# Patient Record
Sex: FEMALE | Race: WHITE | NOT HISPANIC OR LATINO | Employment: OTHER | URBAN - METROPOLITAN AREA
[De-identification: names, ages, dates, MRNs, and addresses within clinical notes are randomized per-mention and may not be internally consistent; named-entity substitution may affect disease eponyms.]

---

## 2017-02-13 ENCOUNTER — HOSPITAL ENCOUNTER (EMERGENCY)
Facility: HOSPITAL | Age: 57
Discharge: HOME | End: 2017-02-13
Attending: EMERGENCY MEDICINE
Payer: COMMERCIAL

## 2017-02-13 VITALS
WEIGHT: 98 LBS | HEART RATE: 110 BPM | SYSTOLIC BLOOD PRESSURE: 116 MMHG | TEMPERATURE: 99 F | HEIGHT: 63 IN | BODY MASS INDEX: 17.36 KG/M2 | RESPIRATION RATE: 26 BRPM | OXYGEN SATURATION: 91 % | DIASTOLIC BLOOD PRESSURE: 62 MMHG

## 2017-02-13 DIAGNOSIS — J40 BRONCHITIS: ICD-10-CM

## 2017-02-13 DIAGNOSIS — J44.1 COPD WITH ACUTE EXACERBATION: ICD-10-CM

## 2017-02-13 DIAGNOSIS — F17.200 TOBACCO USE DISORDER: ICD-10-CM

## 2017-02-13 DIAGNOSIS — R06.03 RESPIRATORY DISTRESS: Primary | ICD-10-CM

## 2017-02-13 LAB
ALBUMIN SERPL BCP-MCNC: 3 G/DL
ALP SERPL-CCNC: 109 U/L
ALT SERPL W/O P-5'-P-CCNC: 11 U/L
AMORPH CRY URNS QL MICRO: NORMAL
ANION GAP SERPL CALC-SCNC: 9 MMOL/L
AST SERPL-CCNC: 13 U/L
BACTERIA #/AREA URNS HPF: NORMAL /HPF
BASOPHILS # BLD AUTO: 0.01 K/UL
BASOPHILS NFR BLD: 0.1 %
BILIRUB SERPL-MCNC: 0.7 MG/DL
BILIRUB UR QL STRIP: NEGATIVE
BNP SERPL-MCNC: 12 PG/ML
BUN SERPL-MCNC: 12 MG/DL
CALCIUM SERPL-MCNC: 9.3 MG/DL
CHLORIDE SERPL-SCNC: 100 MMOL/L
CLARITY UR: CLEAR
CO2 SERPL-SCNC: 26 MMOL/L
COLOR UR: ABNORMAL
CREAT SERPL-MCNC: 0.8 MG/DL
DIFFERENTIAL METHOD: ABNORMAL
EOSINOPHIL # BLD AUTO: 0.1 K/UL
EOSINOPHIL NFR BLD: 0.4 %
ERYTHROCYTE [DISTWIDTH] IN BLOOD BY AUTOMATED COUNT: 12.8 %
EST. GFR  (AFRICAN AMERICAN): >60 ML/MIN/1.73 M^2
EST. GFR  (NON AFRICAN AMERICAN): >60 ML/MIN/1.73 M^2
GLUCOSE SERPL-MCNC: 114 MG/DL
GLUCOSE UR QL STRIP: NEGATIVE
HCT VFR BLD AUTO: 41.9 %
HGB BLD-MCNC: 14.2 G/DL
HGB UR QL STRIP: NEGATIVE
HYALINE CASTS #/AREA URNS LPF: 0 /LPF
KETONES UR QL STRIP: ABNORMAL
LEUKOCYTE ESTERASE UR QL STRIP: NEGATIVE
LYMPHOCYTES # BLD AUTO: 0.8 K/UL
LYMPHOCYTES NFR BLD: 6 %
MCH RBC QN AUTO: 30.2 PG
MCHC RBC AUTO-ENTMCNC: 33.9 %
MCV RBC AUTO: 89 FL
MICROSCOPIC COMMENT: NORMAL
MONOCYTES # BLD AUTO: 0.8 K/UL
MONOCYTES NFR BLD: 6.1 %
NEUTROPHILS # BLD AUTO: 12 K/UL
NEUTROPHILS NFR BLD: 87.4 %
NITRITE UR QL STRIP: NEGATIVE
PH UR STRIP: 5 [PH] (ref 5–8)
PLATELET # BLD AUTO: 272 K/UL
PMV BLD AUTO: 12.5 FL
POTASSIUM SERPL-SCNC: 3.6 MMOL/L
PROT SERPL-MCNC: 7.1 G/DL
PROT UR QL STRIP: ABNORMAL
RBC # BLD AUTO: 4.7 M/UL
RBC #/AREA URNS HPF: 1 /HPF (ref 0–4)
SODIUM SERPL-SCNC: 135 MMOL/L
SP GR UR STRIP: 1.02 (ref 1–1.03)
SQUAMOUS #/AREA URNS HPF: 4 /HPF
TROPONIN I SERPL DL<=0.01 NG/ML-MCNC: <0.006 NG/ML
URN SPEC COLLECT METH UR: ABNORMAL
UROBILINOGEN UR STRIP-ACNC: ABNORMAL EU/DL
WBC # BLD AUTO: 13.7 K/UL
WBC #/AREA URNS HPF: 1 /HPF (ref 0–5)

## 2017-02-13 PROCEDURE — 80053 COMPREHEN METABOLIC PANEL: CPT

## 2017-02-13 PROCEDURE — 85025 COMPLETE CBC W/AUTO DIFF WBC: CPT

## 2017-02-13 PROCEDURE — 81000 URINALYSIS NONAUTO W/SCOPE: CPT

## 2017-02-13 PROCEDURE — 96365 THER/PROPH/DIAG IV INF INIT: CPT

## 2017-02-13 PROCEDURE — 96376 TX/PRO/DX INJ SAME DRUG ADON: CPT

## 2017-02-13 PROCEDURE — 99900035 HC TECH TIME PER 15 MIN (STAT)

## 2017-02-13 PROCEDURE — 25000242 PHARM REV CODE 250 ALT 637 W/ HCPCS: Performed by: EMERGENCY MEDICINE

## 2017-02-13 PROCEDURE — 99284 EMERGENCY DEPT VISIT MOD MDM: CPT | Mod: 25

## 2017-02-13 PROCEDURE — 63600175 PHARM REV CODE 636 W HCPCS: Performed by: EMERGENCY MEDICINE

## 2017-02-13 PROCEDURE — 84484 ASSAY OF TROPONIN QUANT: CPT

## 2017-02-13 PROCEDURE — 94644 CONT INHLJ TX 1ST HOUR: CPT

## 2017-02-13 PROCEDURE — 83880 ASSAY OF NATRIURETIC PEPTIDE: CPT

## 2017-02-13 PROCEDURE — 27100107 HC POCKET PEAK FLOW METER

## 2017-02-13 RX ORDER — MAGNESIUM SULFATE 1 G/100ML
1 INJECTION INTRAVENOUS
Status: COMPLETED | OUTPATIENT
Start: 2017-02-13 | End: 2017-02-13

## 2017-02-13 RX ORDER — IPRATROPIUM BROMIDE 0.5 MG/2.5ML
1 SOLUTION RESPIRATORY (INHALATION)
Status: COMPLETED | OUTPATIENT
Start: 2017-02-13 | End: 2017-02-13

## 2017-02-13 RX ORDER — DOXYCYCLINE 100 MG/1
100 CAPSULE ORAL 2 TIMES DAILY
Qty: 14 CAPSULE | Refills: 0 | Status: SHIPPED | OUTPATIENT
Start: 2017-02-13 | End: 2017-02-20

## 2017-02-13 RX ORDER — METHYLPREDNISOLONE SOD SUCC 125 MG
125 VIAL (EA) INJECTION
Status: COMPLETED | OUTPATIENT
Start: 2017-02-13 | End: 2017-02-13

## 2017-02-13 RX ORDER — ALBUTEROL SULFATE 2.5 MG/.5ML
15 SOLUTION RESPIRATORY (INHALATION)
Status: COMPLETED | OUTPATIENT
Start: 2017-02-13 | End: 2017-02-13

## 2017-02-13 RX ORDER — PREDNISONE 20 MG/1
40 TABLET ORAL DAILY
Qty: 10 TABLET | Refills: 0 | Status: SHIPPED | OUTPATIENT
Start: 2017-02-13 | End: 2017-02-18

## 2017-02-13 RX ADMIN — METHYLPREDNISOLONE SODIUM SUCCINATE 125 MG: 125 INJECTION, POWDER, FOR SOLUTION INTRAMUSCULAR; INTRAVENOUS at 11:02

## 2017-02-13 RX ADMIN — IPRATROPIUM BROMIDE 1 MG: 0.5 SOLUTION RESPIRATORY (INHALATION) at 11:02

## 2017-02-13 RX ADMIN — ALBUTEROL SULFATE 15 MG: 2.5 SOLUTION RESPIRATORY (INHALATION) at 11:02

## 2017-02-13 RX ADMIN — MAGNESIUM SULFATE 1 G: 1 INJECTION INTRAVENOUS at 11:02

## 2017-02-13 NOTE — ED AVS SNAPSHOT
[facility]  [place] [address]               Renée Lucas   2017 11:11 AM   ED    Description:  Female : 1960   Department:  [facility]           Your Care was Coordinated By:     Provider Role From To    Maria A Roberto MD Attending Provider 17 1112 17 1113    Fred Trivedi MD Attending Provider 17 1113 17 1113    Maria A Roberto MD Attending Provider 17 1113 --      Reason for Visit     Shortness of Breath           Diagnoses this Visit        Comments    COPD with acute exacerbation    -  Primary     Tobacco use disorder         Respiratory distress         Bronchitis           ED Disposition     ED Disposition Condition Comment    Discharge             To Do List           Follow-up Information     Follow up with Enoch Fonseca NP In 2 days.    Specialty:  Internal Medicine    Contact information:    [address] [place] [id number] [phone]         These Medications        Disp Refills Start End    predniSONE (DELTASONE) 20 MG tablet 10 tablet 0 2017    Take 2 tablets (40 mg total) by mouth once daily. - Oral    Pharmacy: [facility]  [facility] AT [facility] [place] Ph #: [phone]       doxycycline (VIBRAMYCIN) 100 MG Cap 14 capsule 0 2017    Take 1 capsule (100 mg total) by mouth 2 (two) times daily. - Oral    Pharmacy: [facility]  [facility] AT [facility] [place] Ph #: [phone]         [facility]     [facility] -  Assistance  Registered nurses in the [facility] provide clinical advisement, health education, appointment booking, and other advisory services.  Call for this free service at [phone].             Medications           Message regarding Medications     Verify the  changes and/or additions to your medication regime listed below are the same as discussed with your clinician today.  If any of these changes or additions are incorrect, please notify your healthcare provider.        START taking these NEW medications        Refills    predniSONE (DELTASONE) 20 MG tablet 0    Sig: Take 2 tablets (40 mg total) by mouth once daily.    Class: Print    Route: Oral    doxycycline (VIBRAMYCIN) 100 MG Cap 0    Sig: Take 1 capsule (100 mg total) by mouth 2 (two) times daily.    Class: Print    Route: Oral      These medications were administered today        Dose Freq    methylPREDNISolone sodium succinate injection 125 mg 125 mg ED 1 Time    Sig: Inject 125 mg into the vein ED 1 Time.    Class: Normal    Route: Intravenous    albuterol sulfate nebulizer solution 15 mg 15 mg ED 1 Time    Sig: Take 15 mg by nebulization ED 1 Time.    Class: Normal    Route: Nebulization    ipratropium 0.02 % nebulizer solution 1 mg 1 mg ED 1 Time    Sig: Take 5 mLs (1 mg total) by nebulization ED 1 Time.    Class: Normal    Route: Nebulization    magnesium sulfate in dextrose IVPB (premix) 1 g 1 g ED 1 Time    Sig: Inject 100 mLs (1 g total) into the vein ED 1 Time.    Class: Normal    Route: Intravenous           Verify that the below list of medications is an accurate representation of the medications you are currently taking.  If none reported, the list may be blank. If incorrect, please contact your healthcare provider. Carry this list with you in case of emergency.           Current Medications     acetaminophen (TYLENOL) 500 MG tablet Take 1,500 mg by mouth as needed for Pain.    aclidinium bromide (TUDORZA PRESSAIR) 400 mcg/actuation AePB Inhale 1 puff into the lungs 2 (two) times daily.    albuterol (PROVENTIL) 2.5 mg /3 mL (0.083 %) nebulizer solution Take 3 mLs (2.5 mg total) by nebulization every 6 (six) hours as needed.    ALBUTEROL INHL Inhale 2 puffs into the lungs every 6 (six) hours as  needed.     albuterol sulfate nebulizer solution 15 mg Take 15 mg by nebulization ED 1 Time.    benzonatate (TESSALON) 200 MG capsule Take 1 capsule (200 mg total) by mouth 3 (three) times daily as needed.    BUDESONIDE/FORMOTEROL FUMARATE (SYMBICORT INHL) Inhale 2 puffs into the lungs 2 (two) times daily.     clotrimazole-betamethasone 1-0.05% cream Apply topically 2 (two) times daily.    cyanocobalamin injection 1,000 mcg Inject 1 mL (1,000 mcg total) into the muscle every 30 days.    DALIRESP 500 mcg Tab Take 500 mcg by mouth once daily.     doxycycline (VIBRAMYCIN) 100 MG Cap Take 1 capsule (100 mg total) by mouth 2 (two) times daily.    DYMISTA 137-50 mcg/spray Spry 1 puff by Each Nare route 2 (two) times daily.     estradiol (ESTRACE) 1 MG tablet Take 1 tablet (1 mg total) by mouth once daily.    estradiol cypionate 5 mg/mL injection 5 mg Inject 1 mL (5 mg total) into the muscle every 28 days.    estradiol valerate (DELESTROGEN) injection 20 mg/mL Inject 1 mL (20 mg total) into the muscle once.    estradiol valerate (DELESTROGEN) injection 20 mg/mL Inject 1 mL (20 mg total) into the muscle every 30 days.    estradiol valerate injection 20 mg Inject 0.5 mLs (20 mg total) into the muscle every 21 days.    estradiol valerate injection 20 mg Inject 0.5 mLs (20 mg total) into the muscle every 21 days.    GUAIATUSSIN AC  mg/5 mL syrup Take 5 mLs by mouth every 8 (eight) hours as needed.     ketoconazole (NIZORAL) 2 % cream Apply topically 2 (two) times daily. To the neck for 2-4 weeks.    lansoprazole (PREVACID) 15 MG capsule Take 15 mg by mouth as needed.    montelukast (SINGULAIR) 10 mg tablet Take 10 mg by mouth every evening.     paroxetine (PAXIL) 10 MG tablet Take 2 tablets (20 mg total) by mouth every morning.    predniSONE (DELTASONE) 20 MG tablet Take 2 tablets (40 mg total) by mouth once daily.    SYMBICORT 160-4.5 mcg/actuation HFAA     testosterone cypionate injection 50 mg Inject 0.25 mLs (50 mg  "total) into the muscle every 21 days.    testosterone cypionate injection 50 mg Inject 0.25 mLs (50 mg total) into the muscle every 21 days.           Clinical Reference Information           Your Vitals Were     BP Pulse Temp Resp Height Weight    101/54 (BP Location: Left arm, Patient Position: Sitting, BP Method: Automatic) 112 99.3 °F (37.4 °C) (Oral) 26 5' 3" (1.6 m) 44.5 kg (98 lb)    SpO2 BMI             92% 17.36 kg/m2         Allergies as of 2/13/2017        Reactions    Hydrocodone Nausea And Vomiting, Other (See Comments)    Other reaction(s): upsets her stomach; severe abdominal cramping    Oxycodone Nausea And Vomiting    Other reaction(s): upsets stomach; severe abdominal cramps      Immunizations Administered on Date of Encounter - 2/13/2017     None      ED Micro, Lab, POCT     Start Ordered       Status Ordering Provider    02/13/17 1118 02/13/17 1118  Urinalysis Microscopic  Once      Final result     02/13/17 1117 02/13/17 1118  CBC auto differential  STAT      Final result     02/13/17 1117 02/13/17 1118  Comprehensive metabolic panel  STAT      Final result     02/13/17 1117 02/13/17 1118  B-Type natriuretic peptide (BNP)  STAT      Final result     02/13/17 1117 02/13/17 1118  Troponin I  STAT      Final result     02/13/17 1117 02/13/17 1118  Urinalysis - clean catch  STAT      Final result       ED Imaging Orders     Start Ordered       Status Ordering Provider    02/13/17 1117 02/13/17 1118  X-Ray Chest AP Portable  1 time imaging      Final result         Discharge Instructions       Continue nebulizer treatments every 4-6 hours for the next 24-48 hours.  Take steroids until all gone.  Take antibiotics until all gone.  Continue Symbicort as previously prescribed.  Return to the emergency department for worsening symptoms, persistent shortness of breath despite breathing treatments at home, chest pain, fever, or any other concerns.  Try to stop smoking as this is directly contributing to " your worsening symptoms.    Discharge References/Attachments     BRONCHITIS, ANTIOBIOTIC TREATMENT (ADULT) (ENGLISH)    COPD FLARE (ENGLISH)    SMOKING CESSATION (ENGLISH)      Smoking Cessation     If you would like to quit smoking:   You may be eligible for free services if you are a [place] resident and started smoking cigarettes before September 1, 1988.  Call the [employer]oking [employer]essation [employer] ([employer]CT) toll free at (433) 950-7562 or ([phone] 495-1780.   Call 2-988-QUIT-NOW if you do not meet the above criteria.             [facility] complies with applicable Federal civil rights laws and does not discriminate on the basis of race, color, national origin, age, disability, or sex.        Language Assistance Services     ATTENTION: Language assistance services are available, free of charge. Please call [phone].      ATENCIÓN: Si habla español, tiene a wheat disposición servicios gratuitos de asistencia lingüística. Llame al [phone].     CHÚ Ý: N?u b?n nói Ti?ng Vi?t, có các d?ch v? h? tr? ngôn ng? mi?n phí dành cho b?n. G?i s? [phone].

## 2017-02-13 NOTE — DISCHARGE INSTRUCTIONS
Continue nebulizer treatments every 4-6 hours for the next 24-48 hours.  Take steroids until all gone.  Take antibiotics until all gone.  Continue Symbicort as previously prescribed.  Return to the emergency department for worsening symptoms, persistent shortness of breath despite breathing treatments at home, chest pain, fever, or any other concerns.  Try to stop smoking as this is directly contributing to your worsening symptoms.

## 2017-02-13 NOTE — ED TRIAGE NOTES
Patient comes to the ER with shortness of breath. Patient has a hx of copd and is currently still smoking about 0.5 pack of cigarettes per day on a good day. Patient states it has been getting gradually worse over the week. Patient aaox4. Patient wears of 2L of 02 via nasal cannula todtay. Patient states she double dosed on her breathing treatment this morning. She states feels tightness in her chest.

## 2017-02-13 NOTE — ED PROVIDER NOTES
"Encounter Date: 2/13/2017    SCRIBE #1 NOTE: I, Carol Perez, am scribing for, and in the presence of,  Maria A Roberto MD. I have scribed the following portions of the note - Other sections scribed: HPI/ROS.       History     Chief Complaint   Patient presents with    Shortness of Breath     States its been going on for 2 days now and has a hx of copd     Review of patient's allergies indicates:   Allergen Reactions    Hydrocodone Nausea And Vomiting and Other (See Comments)     Other reaction(s): upsets her stomach; severe abdominal cramping    Oxycodone Nausea And Vomiting     Other reaction(s): upsets stomach; severe abdominal cramps     HPI Comments: CC: SOB    HPI: This 55 yo F smoker who has history of COPD, colon polyp, and pulmonary nodules presents to the ED for evaluation of SOB with associated mild chest tightness s/p COPD that has been gradually worsening for 2 weeks. She attempted treatment with "double" breathing treatments last night and this morning with no relief. The pt states that she was placed on a "med pack" by her doctor, 1 week ago. She has been hospitalized due to COPD exacerbation in the past. She has no hx of intubation to her knowledge. The pt smokes .5 - 1 pack of cigarettes per day. The pt denies fever, chills, diaphoresis, N/V/D, leg swelling, and any other associated symptoms.     The history is provided by the patient. No  was used.     Past Medical History   Diagnosis Date    Colon polyp 10/3/2013    COPD (chronic obstructive pulmonary disease) with emphysema     DDD (degenerative disc disease), lumbar     GERD (gastroesophageal reflux disease)     Pulmonary nodules     Vitamin B 12 deficiency      bets B12 shots     No past medical history pertinent negatives.  Past Surgical History   Procedure Laterality Date    Hysterectomy       complete    Back surgery       times 2 lumbar     Family History   Problem Relation Age of Onset    Cancer " Mother      ovarian    Heart disease Father      CHF    Cancer Sister      breast and vulvar    Heart disease Brother      Myocarditis    Diabetes Mellitus Neg Hx     Stroke Neg Hx      Social History   Substance Use Topics    Smoking status: Current Every Day Smoker     Packs/day: 1.00     Years: 35.00     Types: Cigarettes     Start date: 5/22/1976    Smokeless tobacco: None    Alcohol use Yes      Comment: occasional     Review of Systems   Constitutional: Negative for chills and fever.   HENT: Negative for congestion and rhinorrhea.    Eyes: Negative for visual disturbance.   Respiratory: Positive for chest tightness and shortness of breath. Negative for cough.    Cardiovascular: Negative for leg swelling.   Gastrointestinal: Negative for abdominal pain, diarrhea, nausea and vomiting.   Genitourinary: Negative for dysuria and frequency.   Musculoskeletal: Negative for myalgias.   Skin: Negative for rash.   Neurological: Negative for light-headedness and headaches.       Physical Exam   Initial Vitals   BP Pulse Resp Temp SpO2   02/13/17 1105 02/13/17 1105 02/13/17 1105 02/13/17 1105 02/13/17 1105   125/70 129 26 99.3 °F (37.4 °C) 92 %     Physical Exam    Nursing note and vitals reviewed.  Constitutional: She appears well-developed and well-nourished.   HENT:   Head: Normocephalic and atraumatic.   Eyes: Conjunctivae and EOM are normal.   Neck: Neck supple.   Cardiovascular: Regular rhythm and normal heart sounds. Exam reveals no gallop and no friction rub.    No murmur heard.  Pulmonary/Chest: Accessory muscle usage present. Tachypnea noted. She is in respiratory distress (moderate). She has wheezes. She has no rhonchi. She has no rales.   Tight breathsounds bilaterally.   Abdominal: Soft. Bowel sounds are normal. She exhibits no distension and no pulsatile midline mass. There is no tenderness. There is no rebound and no guarding.   Musculoskeletal: Normal range of motion. She exhibits no edema.    Neurological: She is alert and oriented to person, place, and time. No cranial nerve deficit.   Skin: No rash noted.         ED Course   Critical Care  Date/Time: 2/13/2017 3:22 PM  Performed by: CHARLI YU  Authorized by: CHARLI YU   Direct patient critical care time: 25 minutes  Additional history critical care time: 8 (daughter and ) minutes  Ordering / reviewing critical care time: 5 minutes  Documentation critical care time: 13 minutes  Total critical care time (exclusive of procedural time) : 51 minutes        Labs Reviewed   CBC W/ AUTO DIFFERENTIAL - Abnormal; Notable for the following:        Result Value    WBC 13.70 (*)     Gran # 12.0 (*)     Lymph # 0.8 (*)     Gran% 87.4 (*)     Lymph% 6.0 (*)     All other components within normal limits   COMPREHENSIVE METABOLIC PANEL - Abnormal; Notable for the following:     Sodium 135 (*)     Glucose 114 (*)     Albumin 3.0 (*)     All other components within normal limits   URINALYSIS - Abnormal; Notable for the following:     Protein, UA 1+ (*)     Ketones, UA 1+ (*)     Urobilinogen, UA 4.0-6.0 (*)     All other components within normal limits   B-TYPE NATRIURETIC PEPTIDE   TROPONIN I   URINALYSIS MICROSCOPIC     EKG Readings: (Independently Interpreted)   Sinus tachycardia, rate approximately 120.  Motion artifact present.  No ST elevation or depression.  QTc 410.  No evidence of acute ischemia or malignant arrhythmia.          Medical Decision Making:   History:   Old Medical Records: I decided to obtain old medical records.  Old Records Summarized: records from clinic visits.       <> Summary of Records: Recent clinic visit for COPD in December.  56 y.o. female with history of COPD, who continues to smoke, presents to the emergency department complaining of shortness of breath, chest tightness, for the past 2 weeks, worse over the past 2 days.  She reports taking multiple neb treatments last night, and this morning without  improvement.  Therefore she presents today for evaluation.  Upon arrival to emergency department the patient is in moderate respiratory distress, very poor air movement, a faint expiratory wheezes scattered throughout lung fields.  I have ordered labs, chest x-ray, continuous albuterol and Atrovent neb treatment, steroids and magnesium.  I will continue to closely monitor and reassess.     3:17 PM patient reports improvement of symptoms after treatment provided in the emergency department.  She is breathing more comfortably.  Pulse ox 92-95%.  Patient already has home oxygen at home, that she uses PRN.  Labs reveal mild leukocytosis, white blood cell count 13.7.  Hemoglobin 14.2, hematocrit 41.9.  Creatinine 0.8.  Troponin less than 0.006, BNP 12, I doubt acute MI, CHF at this time.  Heart rate in the 120s upon initial triage evaluation.  Heart rate improved and currently approximately 100.  After long discussion with the patient, and family members present, she decided she feels well enough to go home.  I discussed admission to the hospital versus going home, and she states she would prefer to go home and see how she does on the oral steroids, and breathing treatments at home.  Patient's  is present, and states he is comfortable with her coming home at this time, and will bring her back if her symptoms worsen.  I will cover for bronchitis with doxycycline.  Patient states she already has albuterol and Symbicort at home.  I strongly encouraged that she consider smoking cessation, as this is directly related to her current condition which seems to be worsening per patient.  She states she has tried Wellbutrin, and nicotine patches in the past without success due to side effects.  She states her doctor is hesitant to try her on Chantix, as she is taking antidepressant, Paxil.  She does have a pulmonologist that she sees Dr. Cox at [facility].  She states she is scheduled to see her this month, and  is going to talk to her about switching her inhaled steroid to a different medicine to see if this helps.  She also has a primary care provider that she sees, and will try to schedule an appointment with.  Patient looks much better after treatment provided in the emergency department.  I have considered but doubt acute PE/DVT at this time.  She has no lower extremity swelling, no calf tenderness on exam.  I'm comfortable discharging the patient home, with strict return warnings.  Patient and family members present state understanding discharge plan and are comfortable going home at this time.    Additional MDM:   Smoking Cessation: The patient is a smoker. The patient was counseled on smoking cessation for: 8 minutes. The patient was counseled on tobacco related  health complications. The patient was counseled on how exercise will aide in tobacco cessation. Appropriate patient literature was given to the patient concerning tobacco cessation. The patient was counseled on the adverse effects of second hand smoke.          Scribe Attestation:   Scribe #1: I performed the above scribed service and the documentation accurately describes the services I performed. I attest to the accuracy of the note.    Attending Attestation:           Physician Attestation for Scribe:  Physician Attestation Statement for Scribe #1: I, Maria A Roberto MD, reviewed documentation, as scribed by Carol Perez in my presence, and it is both accurate and complete.                 ED Course     Clinical Impression:   The primary encounter diagnosis was Respiratory distress. Diagnoses of COPD with acute exacerbation, Tobacco use disorder, and Bronchitis were also pertinent to this visit.          Maria A Roberto MD  02/13/17 1527       Maria A Roberto MD  02/13/17 1521

## 2017-03-07 ENCOUNTER — TELEPHONE (OUTPATIENT)
Dept: FAMILY MEDICINE | Facility: CLINIC | Age: 57
End: 2017-03-07

## 2017-03-07 NOTE — TELEPHONE ENCOUNTER
----- Message from Jaky Gordon sent at 3/6/2017  3:21 PM CST -----  Contact: self  Pt needs to speak with Suhas Fonseca. Please call [phone]. thanks

## 2017-03-07 NOTE — TELEPHONE ENCOUNTER
Me        3/7/17 3:00 PM   Note      ----- Message from Jaky Gordon sent at 3/6/2017 3:21 PM CST -----  Contact: self  Pt needs to speak with Suhas Fonseca. Please call [phone]. thanks

## 2017-03-07 NOTE — TELEPHONE ENCOUNTER
----- Message from Kristen Jeter sent at 3/7/2017  2:54 PM CST -----  Contact: [phone]  Pt wants to discuss medication Please call pt at your earliest convenience.  Thanks !

## 2017-05-29 RX ORDER — PAROXETINE 10 MG/1
TABLET, FILM COATED ORAL
Qty: 180 TABLET | Refills: 0 | Status: SHIPPED | OUTPATIENT
Start: 2017-05-29 | End: 2017-08-25 | Stop reason: SDUPTHER

## 2017-06-07 ENCOUNTER — OFFICE VISIT (OUTPATIENT)
Dept: FAMILY MEDICINE | Facility: CLINIC | Age: 57
End: 2017-06-07
Payer: COMMERCIAL

## 2017-06-07 ENCOUNTER — APPOINTMENT (OUTPATIENT)
Dept: RADIOLOGY | Facility: HOSPITAL | Age: 57
End: 2017-06-07
Attending: INTERNAL MEDICINE
Payer: COMMERCIAL

## 2017-06-07 VITALS
WEIGHT: 93.5 LBS | TEMPERATURE: 98 F | SYSTOLIC BLOOD PRESSURE: 106 MMHG | DIASTOLIC BLOOD PRESSURE: 78 MMHG | HEIGHT: 65 IN | OXYGEN SATURATION: 89 % | BODY MASS INDEX: 15.58 KG/M2 | RESPIRATION RATE: 18 BRPM | HEART RATE: 115 BPM

## 2017-06-07 DIAGNOSIS — J43.2 CENTRILOBULAR EMPHYSEMA: Primary | ICD-10-CM

## 2017-06-07 DIAGNOSIS — F17.200 TOBACCO DEPENDENCE: ICD-10-CM

## 2017-06-07 DIAGNOSIS — J44.1 COPD EXACERBATION: ICD-10-CM

## 2017-06-07 PROCEDURE — 99999 PR PBB SHADOW E&M-EST. PATIENT-LVL III: CPT | Mod: PBBFAC,,, | Performed by: INTERNAL MEDICINE

## 2017-06-07 PROCEDURE — 71020 XR CHEST PA AND LATERAL: CPT | Mod: TC,PN

## 2017-06-07 PROCEDURE — 71020 XR CHEST PA AND LATERAL: CPT | Mod: 26,,, | Performed by: RADIOLOGY

## 2017-06-07 PROCEDURE — 99214 OFFICE O/P EST MOD 30 MIN: CPT | Mod: S$GLB,,, | Performed by: INTERNAL MEDICINE

## 2017-06-07 RX ORDER — PREDNISONE 20 MG/1
TABLET ORAL
Qty: 15 TABLET | Refills: 0 | Status: SHIPPED | OUTPATIENT
Start: 2017-06-07 | End: 2017-09-18 | Stop reason: SDUPTHER

## 2017-06-07 RX ORDER — LEVALBUTEROL INHALATION SOLUTION 0.63 MG/3ML
1 SOLUTION RESPIRATORY (INHALATION) EVERY 4 HOURS PRN
COMMUNITY
End: 2019-04-17

## 2017-06-07 RX ORDER — LEVALBUTEROL TARTRATE 45 UG/1
1-2 AEROSOL, METERED ORAL EVERY 4 HOURS PRN
COMMUNITY
End: 2019-04-17

## 2017-06-07 RX ORDER — DOXYCYCLINE HYCLATE 100 MG
100 TABLET ORAL 2 TIMES DAILY
Qty: 14 TABLET | Refills: 0 | Status: SHIPPED | OUTPATIENT
Start: 2017-06-07 | End: 2017-09-18 | Stop reason: SDUPTHER

## 2017-06-07 NOTE — PROGRESS NOTES
"Subjective:       Patient ID: Renée Lucas is a 57 y.o. female.    Chief Complaint: Establish Care; Shortness of Breath; Pneumonia (discuss ); Back Pain; Chest Pain; and Cough    Cough    HPI: 56 y/o w/ COPD on home oxygen tobacco dependence (followed by Dr. Cox of pulmonary at [facility]) presents with worsening cough x two weeks. No fever. Cough productvie of clear sputum more intense then her baseline cough. Wearing oxygen throughout the day (normally wears only at night). No falls, no chest pain. No LE swelling. Reports compliance with twice daily LABA/ICS and daliresp. Using xopenex mdi three to four times per day      Review of Systems   Constitutional: Negative for activity change, appetite change, fatigue, fever and unexpected weight change.   HENT: Negative for ear pain, rhinorrhea and sore throat.    Eyes: Negative for discharge and visual disturbance.   Respiratory: Positive for cough, shortness of breath and wheezing. Negative for chest tightness.    Cardiovascular: Negative for chest pain, palpitations and leg swelling.   Gastrointestinal: Negative for abdominal pain, constipation and diarrhea.   Endocrine: Negative for cold intolerance and heat intolerance.   Genitourinary: Negative for dysuria and hematuria.   Musculoskeletal: Negative for joint swelling and neck stiffness.   Skin: Negative for rash.   Neurological: Negative for dizziness, syncope, weakness and headaches.   Psychiatric/Behavioral: Negative for suicidal ideas.       Objective:     Vitals:    06/07/17 1347   BP: 106/78   BP Location: Left arm   Patient Position: Sitting   BP Method: Manual   Pulse: (!) 115   Resp: 18   Temp: 98.1 °F (36.7 °C)   TempSrc: Oral   SpO2: (!) 89%   Weight: 42.4 kg (93 lb 7.6 oz)   Height: 5' 4.5" (1.638 m)          Physical Exam   Constitutional: She is oriented to person, place, and time. She appears well-developed.   cachectic   HENT:   Head: Normocephalic and atraumatic.   Eyes: Conjunctivae are normal. " "Pupils are equal, round, and reactive to light.   Neck: Normal range of motion.   Cardiovascular: Normal rate and regular rhythm.  Exam reveals no gallop and no friction rub.    No murmur heard.  Pulmonary/Chest: Effort normal. She has wheezes. She has no rales.   Bilateral end exp wheezing in all fields   Abdominal: Soft. Bowel sounds are normal. There is no tenderness. There is no rebound and no guarding.   Musculoskeletal: Normal range of motion. She exhibits no edema or tenderness.   Neurological: She is alert and oriented to person, place, and time. No cranial nerve deficit.   Skin: Skin is warm and dry.   Psychiatric: She has a normal mood and affect.       Assessment:       1. Centrilobular emphysema    2. COPD exacerbation    3. Tobacco dependence        Plan:    1/2. Severe copd with exacerbation check CXR for daiana consolidation, prednisone 40mg x 5 days then 20mg x 5 days, doxycline bid for anti inflammatory effect. Continue inhalers    3. Stressed to patient that continued smoking will worsening her underlying lung disease and will hasten her decline. She voices understanding but does not wish to quit "I'm not ready"       "

## 2017-08-15 ENCOUNTER — TELEPHONE (OUTPATIENT)
Dept: OBSTETRICS AND GYNECOLOGY | Facility: CLINIC | Age: 57
End: 2017-08-15

## 2017-08-15 NOTE — TELEPHONE ENCOUNTER
----- Message from Jannet Funk sent at 8/15/2017  2:06 PM CDT -----  Contact: self  Patient requests orders and to schedule mammogram and bone density done.  [phone]. MRD

## 2017-08-22 ENCOUNTER — OFFICE VISIT (OUTPATIENT)
Dept: OBSTETRICS AND GYNECOLOGY | Facility: CLINIC | Age: 57
End: 2017-08-22
Payer: COMMERCIAL

## 2017-08-22 VITALS
SYSTOLIC BLOOD PRESSURE: 118 MMHG | BODY MASS INDEX: 16.14 KG/M2 | WEIGHT: 94.56 LBS | DIASTOLIC BLOOD PRESSURE: 73 MMHG | HEIGHT: 64 IN

## 2017-08-22 DIAGNOSIS — N95.2 ATROPHIC VAGINITIS: ICD-10-CM

## 2017-08-22 DIAGNOSIS — Z00.00 ANNUAL PHYSICAL EXAM: ICD-10-CM

## 2017-08-22 DIAGNOSIS — N95.1 MENOPAUSAL STATE: Primary | ICD-10-CM

## 2017-08-22 DIAGNOSIS — Z90.710 STATUS POST HYSTERECTOMY: ICD-10-CM

## 2017-08-22 DIAGNOSIS — Z79.890 HORMONE REPLACEMENT THERAPY (HRT): ICD-10-CM

## 2017-08-22 DIAGNOSIS — F52.9 FEMALE SEXUAL DYSFUNCTION: ICD-10-CM

## 2017-08-22 PROCEDURE — 87480 CANDIDA DNA DIR PROBE: CPT

## 2017-08-22 PROCEDURE — 99396 PREV VISIT EST AGE 40-64: CPT | Mod: S$GLB,,, | Performed by: OBSTETRICS & GYNECOLOGY

## 2017-08-22 PROCEDURE — 87660 TRICHOMONAS VAGIN DIR PROBE: CPT

## 2017-08-22 PROCEDURE — 3008F BODY MASS INDEX DOCD: CPT | Mod: S$GLB,,, | Performed by: OBSTETRICS & GYNECOLOGY

## 2017-08-22 PROCEDURE — 99999 PR PBB SHADOW E&M-EST. PATIENT-LVL III: CPT | Mod: PBBFAC,,, | Performed by: OBSTETRICS & GYNECOLOGY

## 2017-08-22 RX ORDER — ESTRADIOL VALERATE 40 MG/ML
40 INJECTION INTRAMUSCULAR
Qty: 5 ML | Refills: 5 | Status: SHIPPED | OUTPATIENT
Start: 2017-08-22 | End: 2019-04-17

## 2017-08-22 RX ORDER — CYCLOBENZAPRINE HCL 10 MG
TABLET ORAL
Refills: 0 | COMMUNITY
Start: 2017-07-12 | End: 2020-10-19 | Stop reason: ALTCHOICE

## 2017-08-22 RX ORDER — GABAPENTIN 300 MG/1
CAPSULE ORAL
Refills: 12 | COMMUNITY
Start: 2017-06-04

## 2017-08-22 RX ORDER — TESTOSTERONE CYPIONATE 200 MG/ML
200 INJECTION, SOLUTION INTRAMUSCULAR
Qty: 5 ML | Refills: 5 | Status: SHIPPED | OUTPATIENT
Start: 2017-08-22 | End: 2019-04-17

## 2017-08-22 NOTE — PROGRESS NOTES
Subjective:      Chief Complaint:  MENOPAUSL  Chief Complaint   Patient presents with    Gynecologic Exam       Menstrual History:    OB History      Para Term  AB Living    2         2    SAB TAB Ectopic Multiple Live Births                       Menarche age: 13     No LMP recorded. Patient has had a hysterectomy.           Objective:        History of Present Illness AND  Examination detailed DICTATE:       HISTORY OF PRESENT ILLNESS:  The patient is 57 years of age, here for annual   yearly examination.  The patient is a  2, para 2.  Pap smear in    negative.  The patient's medical and past history reviewed, colon polyps, COPD,   GERD, pulmonary nodules.  The patient's surgery, hysterectomy and back surgery.    The patient is complaining of sexual dysfunction, painful sex and decreased sex   drive.  Has been on Depo-Estradiol and testosterone injection in the past,   which was very helpful.  The patient wishes to go back on it.  Pros, cons, side   effect, complication and risk discussed.  The patient's comprehensive metabolic   panel indicate mild elevation of blood sugar, otherwise normal.  The patient is   getting treatment for chronic pulmonary lung disease.  We will start her on   Depo-Estradiol and testosterone.  She has somebody who can give her the shot, so   we will send the prescription for the pharmacy for Depo-Estradiol and   testosterone.  Already mammogram and bone density is ordered.  Plan, restart   estrogen.  The patient also getting B12 injection, so we will restart the stated   Depo-Estradiol and testosterone.    REVIEW OF SYSTEMS:  HEAD, EAR, EYES, NOSE, AND THROAT:  Negative.  CARDIORESPIRATORY:  The patient with chronic obstructive lung disease.  GASTROINTESTINAL:  Negative.  GENITOURINARY:  See present illness.  NEUROMUSCULAR:  Negative.    PHYSICAL EXAMINATION:  VITAL SIGNS:  The patient's blood pressure 118/73, weight 94.  The patient is   somewhat  underweight.  HEAD:  Normocephalic.  EYES:  Reactive.  NECK:  Supple.  Thyroid is not palpable, no nodes.  CHEST:  Decreased breath sounds, some wheezing and rhonchi, compatible with   chronic obstructive lung disease.  HEART:  Regular sinus rhythm, no murmur.  BREASTS:  No lumps, masses, discharge, skin changes, retraction, nipple changes.    Axilla negative.  ABDOMEN:  Upper abdomen normal.  Lower abdomen normal.  No guarding, rebound or   tenderness.  PELVIC:  External, vulva atrophic.  Bartholin, urethral and Bier glands are   negative.  Vagina is very thin, atrophic, whitish discharge.  Cervix, uterus,   adnexa are absent.  Good apical support.  RECTAL:  Negative.  MUSCULOSKELETAL:  Negative.  NEUROLOGIC:  Grossly normal.  SKIN:  Clear.    IMPRESSION:  Menopausal, atrophic vagina.    PLAN:  Restart estrogen as stated.  Continue with calcium and vitamin D,   multivitamins.  The patient received the benefit of a vaginal culture.  We will   await the report before we treat if it is necessary.  We will see the patient   back within a year.      ELIO  dd: 08/22/2017 09:39:47 (CDT)  td: 08/22/2017 19:53:05 (CDT)  Doc ID   #[id number]  Job ID #[id number]    CC:           Assessment:      Diagnosis: ANNUAL   EXAM   VAG   ATROPHY   SEXUAL  DYSFUNCTION   MENOPAUSAL       Plan:      Return in 12  months

## 2017-08-22 NOTE — PATIENT INSTRUCTIONS

## 2017-08-24 ENCOUNTER — TELEPHONE (OUTPATIENT)
Dept: OBSTETRICS AND GYNECOLOGY | Facility: CLINIC | Age: 57
End: 2017-08-24

## 2017-08-24 LAB
CANDIDA RRNA VAG QL PROBE: NEGATIVE
G VAGINALIS RRNA GENITAL QL PROBE: NEGATIVE
T VAGINALIS RRNA GENITAL QL PROBE: NEGATIVE

## 2017-08-25 ENCOUNTER — HOSPITAL ENCOUNTER (OUTPATIENT)
Dept: RADIOLOGY | Facility: HOSPITAL | Age: 57
Discharge: HOME | End: 2017-08-25
Attending: OBSTETRICS & GYNECOLOGY
Payer: COMMERCIAL

## 2017-08-25 VITALS — WEIGHT: 94 LBS | HEIGHT: 64 IN | BODY MASS INDEX: 16.05 KG/M2

## 2017-08-25 DIAGNOSIS — Z12.31 ENCOUNTER FOR SCREENING MAMMOGRAM FOR BREAST CANCER: ICD-10-CM

## 2017-08-25 DIAGNOSIS — N95.1 MENOPAUSAL STATE: ICD-10-CM

## 2017-08-25 PROCEDURE — 77067 SCR MAMMO BI INCL CAD: CPT | Mod: TC

## 2017-08-25 PROCEDURE — 77067 SCR MAMMO BI INCL CAD: CPT | Mod: 26,,, | Performed by: RADIOLOGY

## 2017-08-25 RX ORDER — PAROXETINE 10 MG/1
TABLET, FILM COATED ORAL
Qty: 180 TABLET | Refills: 0 | Status: SHIPPED | OUTPATIENT
Start: 2017-08-25 | End: 2017-11-16 | Stop reason: SDUPTHER

## 2017-09-05 ENCOUNTER — HOSPITAL ENCOUNTER (OUTPATIENT)
Dept: RADIOLOGY | Facility: HOSPITAL | Age: 57
Discharge: HOME | End: 2017-09-05
Attending: OBSTETRICS & GYNECOLOGY
Payer: COMMERCIAL

## 2017-09-05 DIAGNOSIS — M85.80 OSTEOPENIA, UNSPECIFIED LOCATION: ICD-10-CM

## 2017-09-05 PROCEDURE — 77080 DXA BONE DENSITY AXIAL: CPT | Mod: TC

## 2017-09-05 PROCEDURE — 77080 DXA BONE DENSITY AXIAL: CPT | Mod: 26,,, | Performed by: RADIOLOGY

## 2017-09-06 ENCOUNTER — TELEPHONE (OUTPATIENT)
Dept: OBSTETRICS AND GYNECOLOGY | Facility: CLINIC | Age: 57
End: 2017-09-06

## 2017-09-06 NOTE — TELEPHONE ENCOUNTER
----- Message from Laurie Xie sent at 9/6/2017  3:55 PM CDT -----  Contact: self  Patient states she missed a call in regards to Bone Density test results.  [phone]

## 2017-09-07 ENCOUNTER — OFFICE VISIT (OUTPATIENT)
Dept: OBSTETRICS AND GYNECOLOGY | Facility: CLINIC | Age: 57
End: 2017-09-07
Payer: COMMERCIAL

## 2017-09-07 VITALS
BODY MASS INDEX: 16.05 KG/M2 | WEIGHT: 94 LBS | DIASTOLIC BLOOD PRESSURE: 71 MMHG | SYSTOLIC BLOOD PRESSURE: 120 MMHG | HEIGHT: 64 IN

## 2017-09-07 DIAGNOSIS — N95.1 MENOPAUSAL STATE: Primary | ICD-10-CM

## 2017-09-07 DIAGNOSIS — Z79.890 HORMONE REPLACEMENT THERAPY (HRT): ICD-10-CM

## 2017-09-07 DIAGNOSIS — M85.80 OSTEOPENIA, UNSPECIFIED LOCATION: ICD-10-CM

## 2017-09-07 DIAGNOSIS — Z90.710 STATUS POST HYSTERECTOMY: ICD-10-CM

## 2017-09-07 PROCEDURE — 3008F BODY MASS INDEX DOCD: CPT | Mod: S$GLB,,, | Performed by: OBSTETRICS & GYNECOLOGY

## 2017-09-07 PROCEDURE — 99999 PR PBB SHADOW E&M-EST. PATIENT-LVL III: CPT | Mod: PBBFAC,,, | Performed by: OBSTETRICS & GYNECOLOGY

## 2017-09-07 PROCEDURE — 99212 OFFICE O/P EST SF 10 MIN: CPT | Mod: S$GLB,,, | Performed by: OBSTETRICS & GYNECOLOGY

## 2017-09-07 NOTE — PROGRESS NOTES
Subjective:      Chief Complaint:  OPSTEOPENIA  Chief Complaint   Patient presents with    Results     bone density       Menstrual History:    OB History      Para Term  AB Living    4 4 4     2    SAB TAB Ectopic Multiple Live Births                       Menarche age: 13     No LMP recorded. Patient has had a hysterectomy.           Objective:        History of Present Illness AND  Examination detailed DICTATE:       HISTORY OF PRESENT ILLNESS:  The patient is 57 years of age.  She was here   recently for annual exam.  She is a  4, para 2.  Has been on estradiol   replacement.  Past surgical history, hysterectomy, back surgery.  Medical, colon   polyps, COPD, GERD syndrome and pulmonary nodules.  The patient's bone density   indicated osteopenia in all areas.  Discussed the finding with the patient,   increased risk of bone fracture.    PLAN AND TREATMENT:  Continue with calcium and vitamin D.  Increase physical   activity, weightbearing activity.  Also, continue with estradiol and we will   repeat the bone density within a year.  I stressed the importance of exercise   with some weightbearing exercise to have to remodel her bones.      ELIO  dd: 2017 09:39:06 (CDT)  td: 2017 21:06:11 (CDT)  Doc ID   #[id number]  Job ID #[id number]    CC:           Assessment:      Diagnosis: MENOPAUSAL    OSTEOPENIA       Plan:      Return in 12  months

## 2017-09-18 ENCOUNTER — OFFICE VISIT (OUTPATIENT)
Dept: FAMILY MEDICINE | Facility: CLINIC | Age: 57
End: 2017-09-18
Payer: COMMERCIAL

## 2017-09-18 VITALS
OXYGEN SATURATION: 94 % | WEIGHT: 95.44 LBS | DIASTOLIC BLOOD PRESSURE: 62 MMHG | HEIGHT: 64 IN | BODY MASS INDEX: 16.29 KG/M2 | RESPIRATION RATE: 20 BRPM | TEMPERATURE: 99 F | HEART RATE: 76 BPM | SYSTOLIC BLOOD PRESSURE: 112 MMHG

## 2017-09-18 DIAGNOSIS — E88.01 ALPHA-1-ANTITRYPSIN DEFICIENCY: ICD-10-CM

## 2017-09-18 DIAGNOSIS — J44.1 COPD EXACERBATION: Primary | ICD-10-CM

## 2017-09-18 PROCEDURE — 99214 OFFICE O/P EST MOD 30 MIN: CPT | Mod: S$GLB,,, | Performed by: INTERNAL MEDICINE

## 2017-09-18 PROCEDURE — 99999 PR PBB SHADOW E&M-EST. PATIENT-LVL III: CPT | Mod: PBBFAC,,, | Performed by: INTERNAL MEDICINE

## 2017-09-18 PROCEDURE — 3008F BODY MASS INDEX DOCD: CPT | Mod: S$GLB,,, | Performed by: INTERNAL MEDICINE

## 2017-09-18 RX ORDER — DOXYCYCLINE HYCLATE 100 MG
100 TABLET ORAL 2 TIMES DAILY
Qty: 14 TABLET | Refills: 0 | Status: SHIPPED | OUTPATIENT
Start: 2017-09-18 | End: 2018-09-18 | Stop reason: ALTCHOICE

## 2017-09-18 RX ORDER — PREDNISONE 20 MG/1
TABLET ORAL
Qty: 15 TABLET | Refills: 0 | Status: SHIPPED | OUTPATIENT
Start: 2017-09-18 | End: 2019-04-17

## 2017-09-18 RX ORDER — BENZONATATE 200 MG/1
200 CAPSULE ORAL 3 TIMES DAILY PRN
Qty: 90 CAPSULE | Refills: 0 | Status: SHIPPED | OUTPATIENT
Start: 2017-09-18 | End: 2019-04-17

## 2017-09-18 RX ORDER — TRAMADOL HYDROCHLORIDE 50 MG/1
TABLET ORAL
Refills: 0 | COMMUNITY
Start: 2017-09-14

## 2017-09-18 NOTE — PROGRESS NOTES
Subjective:       Patient ID: Renée Lucas is a 57 y.o. female.    Chief Complaint: URI    She presents today with complaints of a two-week history of dry cough, wheezing and tightness in her chest.  She has been taking Mucinex and using her inhalers as usual.  She should be wearing oxygen nightly but admits that she is not.  She has been using an over the past 2 days however.  She is using her nebulizer 3 times daily.  She reports that her oxygen saturation is been as low as 88 recently.  She is followed by Dr. Cox and has CT scans of PFTs yearly.  She does report a decline in her PFTs recently.      Review of Systems   Constitutional: Negative for fever.   HENT: Positive for congestion. Negative for ear pain and sore throat.    Respiratory: Positive for cough, chest tightness, shortness of breath and wheezing.        Objective:      Physical Exam   Constitutional: She is oriented to person, place, and time. She appears well-developed and well-nourished. No distress.   HENT:   Head: Normocephalic and atraumatic.   Right Ear: External ear normal.   Left Ear: External ear normal.   Mouth/Throat: Oropharynx is clear and moist. No oropharyngeal exudate.   Eyes: Conjunctivae and EOM are normal. Pupils are equal, round, and reactive to light. No scleral icterus.   Cardiovascular: Normal rate, regular rhythm and normal heart sounds.  Exam reveals no gallop and no friction rub.    No murmur heard.  Pulmonary/Chest: Effort normal. No respiratory distress. She has wheezes. She has no rales.   Decreased air movement   Neurological: She is alert and oriented to person, place, and time. No cranial nerve deficit.   Skin: Skin is warm and dry.   Psychiatric: She has a normal mood and affect.   Vitals reviewed.      Assessment:       1. Alpha-1-antitrypsin deficiency    2. COPD exacerbation        Plan:          Renée was seen today for uri.    Diagnoses and all orders for this visit:    COPD exacerbation - 56 yo with  severe COPD on home O2 presents with a two-week history of worsening symptoms.  Treating as below.  Encouraged compliance with her oxygen.  Nebulizers 4 times daily.  Continue Mucinex also. F/u if no improvement  -     benzonatate (TESSALON) 200 MG capsule; Take 1 capsule (200 mg total) by mouth 3 (three) times daily as needed.  -     doxycycline (VIBRA-TABS) 100 MG tablet; Take 1 tablet (100 mg total) by mouth 2 (two) times daily.  -     predniSONE (DELTASONE) 20 MG tablet; Two tabs (40mg) po daily x five days, then one tab (20mg) po daily x 5 days    Alpha-1-antitrypsin deficiency - f/u pulmonary, Dr. Cox.    F/u prn

## 2017-11-16 RX ORDER — PAROXETINE 10 MG/1
TABLET, FILM COATED ORAL
Qty: 180 TABLET | Refills: 0 | Status: SHIPPED | OUTPATIENT
Start: 2017-11-16 | End: 2018-02-12 | Stop reason: SDUPTHER

## 2018-02-12 RX ORDER — PAROXETINE 10 MG/1
TABLET, FILM COATED ORAL
Qty: 180 TABLET | Refills: 0 | Status: SHIPPED | OUTPATIENT
Start: 2018-02-12 | End: 2018-03-12 | Stop reason: SDUPTHER

## 2018-03-12 ENCOUNTER — TELEPHONE (OUTPATIENT)
Dept: FAMILY MEDICINE | Facility: CLINIC | Age: 58
End: 2018-03-12

## 2018-03-12 DIAGNOSIS — F32.89 OTHER DEPRESSION: Primary | ICD-10-CM

## 2018-03-12 RX ORDER — PAROXETINE HYDROCHLORIDE 20 MG/1
20 TABLET, FILM COATED ORAL EVERY MORNING
Qty: 90 TABLET | Refills: 1 | Status: SHIPPED | OUTPATIENT
Start: 2018-03-12 | End: 2018-09-06 | Stop reason: SDUPTHER

## 2018-03-12 NOTE — TELEPHONE ENCOUNTER
----- Message from Linette Perez LPN sent at 3/12/2018 10:57 AM CDT -----  Contact: [facility]      ----- Message -----  From: Archana Anaya  Sent: 3/12/2018  10:32 AM  To: Eder Reyes Staff    Luz at [facility] called, Insurance will pay for PAXIL 20MG ONCE A DAY.    Please send new script, Thanks!

## 2018-04-26 NOTE — TELEPHONE ENCOUNTER
----- Message from Jeanna Ansari sent at 8/24/2017 10:29 AM CDT -----  Contact: self  Patient states she is returning a call regarding test results. Patient can be reached at [phone]   patient

## 2018-08-13 ENCOUNTER — TELEPHONE (OUTPATIENT)
Dept: OBSTETRICS AND GYNECOLOGY | Facility: CLINIC | Age: 58
End: 2018-08-13

## 2018-08-13 DIAGNOSIS — N95.1 SYMPTOMATIC MENOPAUSAL OR FEMALE CLIMACTERIC STATES: ICD-10-CM

## 2018-08-13 DIAGNOSIS — N95.1 MENOPAUSAL STATE: Primary | ICD-10-CM

## 2018-08-13 RX ORDER — ESTRADIOL 1 MG/1
1 TABLET ORAL DAILY
Qty: 30 TABLET | Refills: 2 | Status: SHIPPED | OUTPATIENT
Start: 2018-08-13 | End: 2018-09-12

## 2018-08-13 NOTE — TELEPHONE ENCOUNTER
----- Message from Enid Fonseca sent at 8/13/2018 11:04 AM CDT -----  Contact: self  Pt called to request a refill of estradiol (ESTRACE) 1 MG tablet to be sent to [facility]. Please call [phone].  ----------------------------------------------------  8/13/18 @ 6760 ([facility])   INCOMING CALL FROM PT REQUESTING A REFILL FOR HER ESTRACE  MESSAGE SENT TO DR HAMM  FOR REFILL

## 2018-09-06 DIAGNOSIS — F32.89 OTHER DEPRESSION: ICD-10-CM

## 2018-09-06 RX ORDER — PAROXETINE HYDROCHLORIDE 20 MG/1
TABLET, FILM COATED ORAL
Qty: 90 TABLET | Refills: 0 | Status: SHIPPED | OUTPATIENT
Start: 2018-09-06 | End: 2018-09-18 | Stop reason: SDUPTHER

## 2018-09-06 NOTE — TELEPHONE ENCOUNTER
Left message for pt that Dr Gaines has refilled her med, and that she needs to call the office to schedule an appt with PCP.

## 2018-09-18 ENCOUNTER — OFFICE VISIT (OUTPATIENT)
Dept: FAMILY MEDICINE | Facility: CLINIC | Age: 58
End: 2018-09-18
Payer: COMMERCIAL

## 2018-09-18 VITALS
BODY MASS INDEX: 18.67 KG/M2 | WEIGHT: 109.38 LBS | HEIGHT: 64 IN | DIASTOLIC BLOOD PRESSURE: 70 MMHG | RESPIRATION RATE: 17 BRPM | SYSTOLIC BLOOD PRESSURE: 122 MMHG | OXYGEN SATURATION: 96 % | HEART RATE: 106 BPM | TEMPERATURE: 99 F

## 2018-09-18 DIAGNOSIS — F32.89 OTHER DEPRESSION: ICD-10-CM

## 2018-09-18 DIAGNOSIS — F33.41 RECURRENT MAJOR DEPRESSIVE DISORDER, IN PARTIAL REMISSION: ICD-10-CM

## 2018-09-18 DIAGNOSIS — E88.01 ALPHA-1-ANTITRYPSIN DEFICIENCY: Primary | ICD-10-CM

## 2018-09-18 DIAGNOSIS — Z23 NEED FOR INFLUENZA VACCINATION: ICD-10-CM

## 2018-09-18 PROCEDURE — 99999 PR PBB SHADOW E&M-EST. PATIENT-LVL III: CPT | Mod: PBBFAC,,, | Performed by: INTERNAL MEDICINE

## 2018-09-18 PROCEDURE — 90471 IMMUNIZATION ADMIN: CPT | Mod: S$GLB,,, | Performed by: INTERNAL MEDICINE

## 2018-09-18 PROCEDURE — 3008F BODY MASS INDEX DOCD: CPT | Mod: CPTII,S$GLB,, | Performed by: INTERNAL MEDICINE

## 2018-09-18 PROCEDURE — 90686 IIV4 VACC NO PRSV 0.5 ML IM: CPT | Mod: S$GLB,,, | Performed by: INTERNAL MEDICINE

## 2018-09-18 PROCEDURE — 99214 OFFICE O/P EST MOD 30 MIN: CPT | Mod: 25,S$GLB,, | Performed by: INTERNAL MEDICINE

## 2018-09-18 RX ORDER — ALBUTEROL SULFATE 90 UG/1
AEROSOL, METERED RESPIRATORY (INHALATION)
Refills: 6 | COMMUNITY
Start: 2018-09-12

## 2018-09-18 RX ORDER — AZELASTINE 1 MG/ML
SPRAY, METERED NASAL
Refills: 3 | COMMUNITY
Start: 2018-08-28

## 2018-09-18 RX ORDER — PROMETHAZINE HYDROCHLORIDE AND CODEINE PHOSPHATE 6.25; 1 MG/5ML; MG/5ML
5 SOLUTION ORAL EVERY 12 HOURS PRN
Qty: 118 ML | Refills: 0 | Status: SHIPPED | OUTPATIENT
Start: 2018-09-18 | End: 2018-09-28

## 2018-09-18 RX ORDER — METRONIDAZOLE 500 MG/1
TABLET ORAL
Refills: 0 | COMMUNITY
Start: 2018-09-06 | End: 2019-04-17

## 2018-09-18 RX ORDER — MIRTAZAPINE 15 MG/1
15 TABLET, FILM COATED ORAL NIGHTLY
Qty: 30 TABLET | Refills: 5 | Status: SHIPPED | OUTPATIENT
Start: 2018-09-18 | End: 2019-03-14 | Stop reason: SDUPTHER

## 2018-09-18 RX ORDER — PREDNISONE 5 MG/1
TABLET ORAL
COMMUNITY
Start: 2018-09-17 | End: 2020-10-19

## 2018-09-18 RX ORDER — TIOTROPIUM BROMIDE INHALATION SPRAY 3.12 UG/1
SPRAY, METERED RESPIRATORY (INHALATION)
Refills: 6 | COMMUNITY
Start: 2018-09-12

## 2018-09-18 RX ORDER — PAROXETINE HYDROCHLORIDE 20 MG/1
20 TABLET, FILM COATED ORAL DAILY
Qty: 90 TABLET | Refills: 2 | Status: SHIPPED | OUTPATIENT
Start: 2018-09-18 | End: 2019-08-24 | Stop reason: SDUPTHER

## 2018-09-18 RX ORDER — FLUTICASONE PROPIONATE 50 MCG
SPRAY, SUSPENSION (ML) NASAL
Refills: 3 | COMMUNITY
Start: 2018-08-28

## 2018-09-18 RX ORDER — NYSTATIN 100000 [USP'U]/ML
SUSPENSION ORAL
Refills: 0 | COMMUNITY
Start: 2018-06-26 | End: 2019-04-17

## 2018-09-18 RX ORDER — ESTRADIOL 1 MG/1
TABLET ORAL
Refills: 2 | COMMUNITY
Start: 2018-09-10 | End: 2018-12-12 | Stop reason: SDUPTHER

## 2018-09-18 RX ORDER — PENICILLIN V POTASSIUM 500 MG/1
TABLET, FILM COATED ORAL
Refills: 0 | COMMUNITY
Start: 2018-09-06 | End: 2019-04-17

## 2018-09-18 NOTE — PROGRESS NOTES
"Subjective:       Patient ID: Renée Lucas is a 58 y.o. female.    Chief Complaint: Anxiety and COPD    Anxiety and cough    HPI: 57 y/o with alpha 1 antitrypisin deficiency followed by pulmonary at [facility] (Dr. Cox) on supplemental home O2 presents for follow up. Primary complaint is anxiety disrupting sleep racing thoughts she has been on paxil for many years feels it is less effective over last six months no SI/HI looking forward to daughter's wedding in April 2019. Daily dry non producitve cough minimal improvement with flonase and nasal saline home oxygen is humidified. No fevers/chills. Weight unchanged      Review of Systems   Constitutional: Negative for activity change, appetite change, fatigue, fever and unexpected weight change.   HENT: Negative for ear pain, rhinorrhea and sore throat.    Eyes: Negative for discharge and visual disturbance.   Respiratory: Negative for chest tightness, shortness of breath and wheezing.    Cardiovascular: Negative for chest pain, palpitations and leg swelling.   Gastrointestinal: Negative for abdominal pain, constipation and diarrhea.   Endocrine: Negative for cold intolerance and heat intolerance.   Genitourinary: Negative for dysuria and hematuria.   Musculoskeletal: Negative for joint swelling and neck stiffness.   Skin: Negative for rash.   Neurological: Negative for dizziness, syncope, weakness and headaches.   Psychiatric/Behavioral: Negative for suicidal ideas.       Objective:     Vitals:    09/18/18 1131   BP: 122/70   BP Location: Right arm   Patient Position: Sitting   BP Method: Small (Manual)   Pulse: 106   Resp: 17   Temp: 98.8 °F (37.1 °C)   TempSrc: Oral   SpO2: 96%   Weight: 49.6 kg (109 lb 5.6 oz)   Height: 5' 4" (1.626 m)          Physical Exam   Constitutional: She is oriented to person, place, and time. She appears well-developed.   cachectic   HENT:   Head: Normocephalic and atraumatic.   Eyes: Conjunctivae are normal. No scleral icterus.   Neck: " Normal range of motion.   Cardiovascular: Normal rate and regular rhythm. Exam reveals no gallop and no friction rub.   No murmur heard.  Pulmonary/Chest: Effort normal and breath sounds normal. She has no wheezes. She has no rales.   Wearing supplemental pulse oxgyen    Abdominal: Soft. Bowel sounds are normal. There is no tenderness. There is no rebound and no guarding.   Musculoskeletal: Normal range of motion. She exhibits no edema or tenderness.   Neurological: She is alert and oriented to person, place, and time. No cranial nerve deficit.   Skin: Skin is warm and dry.   Psychiatric: She has a normal mood and affect.       Assessment and Plan   1. Alpha-1-antitrypsin deficiency  Three times daily nasal saline codeine cough suppressant only for exacerbation of cough not to be daily  - promethazine-codeine 6.25-10 mg/5 ml (PHENERGAN WITH CODEINE) 6.25-10 mg/5 mL syrup; Take 5 mLs by mouth every 12 (twelve) hours as needed for Cough.  Dispense: 118 mL; Refill: 0  - paroxetine (PAXIL) 20 MG tablet; Take 1 tablet (20 mg total) by mouth once daily.  Dispense: 90 tablet; Refill: 2    2. Need for influenza vaccination  Flu vaccine today  - Influenza - Quadrivalent (3 years & older) (PF)    3. Recurrent major depressive disorder, in partial remission  Add remeron to target improved sleep and increase appetite  - mirtazapine (REMERON) 15 MG tablet; Take 1 tablet (15 mg total) by mouth every evening.  Dispense: 30 tablet; Refill: 5  - paroxetine (PAXIL) 20 MG tablet; Take 1 tablet (20 mg total) by mouth once daily.  Dispense: 90 tablet; Refill: 2    4. Other depression  As above

## 2018-09-18 NOTE — PROGRESS NOTES
PT AFEBRILE INFLUENZA VACCINE ADMINISTERED TO R DELTOID , VIS REVIEW AND GIVEN TO PT , PT INSTRUCTED TO WAIT 15 MIN TO MONITOR FOR ADVERSE REACTION , PT ACCOMPANIED BY DAUGHTER VERBALIZES UNDERSTANDING

## 2018-10-31 ENCOUNTER — HOSPITAL ENCOUNTER (EMERGENCY)
Facility: HOSPITAL | Age: 58
Discharge: HOME | End: 2018-10-31
Attending: EMERGENCY MEDICINE
Payer: COMMERCIAL

## 2018-10-31 VITALS
BODY MASS INDEX: 19.49 KG/M2 | RESPIRATION RATE: 22 BRPM | DIASTOLIC BLOOD PRESSURE: 59 MMHG | OXYGEN SATURATION: 96 % | WEIGHT: 110 LBS | HEART RATE: 95 BPM | SYSTOLIC BLOOD PRESSURE: 115 MMHG | HEIGHT: 63 IN | TEMPERATURE: 98 F

## 2018-10-31 DIAGNOSIS — M54.42 ACUTE BILATERAL LOW BACK PAIN WITH BILATERAL SCIATICA: ICD-10-CM

## 2018-10-31 DIAGNOSIS — M51.36 DDD (DEGENERATIVE DISC DISEASE), LUMBAR: Primary | ICD-10-CM

## 2018-10-31 DIAGNOSIS — M54.41 ACUTE BILATERAL LOW BACK PAIN WITH BILATERAL SCIATICA: ICD-10-CM

## 2018-10-31 LAB
ANION GAP SERPL CALC-SCNC: 10 MMOL/L
BASOPHILS # BLD AUTO: 0.03 K/UL
BASOPHILS NFR BLD: 0.2 %
BILIRUB UR QL STRIP: NEGATIVE
BUN SERPL-MCNC: 16 MG/DL
CALCIUM SERPL-MCNC: 9.6 MG/DL
CHLORIDE SERPL-SCNC: 99 MMOL/L
CLARITY UR: CLEAR
CO2 SERPL-SCNC: 31 MMOL/L
COLOR UR: YELLOW
CREAT SERPL-MCNC: 0.7 MG/DL
DIFFERENTIAL METHOD: ABNORMAL
EOSINOPHIL # BLD AUTO: 0.2 K/UL
EOSINOPHIL NFR BLD: 0.8 %
ERYTHROCYTE [DISTWIDTH] IN BLOOD BY AUTOMATED COUNT: 13.1 %
EST. GFR  (AFRICAN AMERICAN): >60 ML/MIN/1.73 M^2
EST. GFR  (NON AFRICAN AMERICAN): >60 ML/MIN/1.73 M^2
GLUCOSE SERPL-MCNC: 122 MG/DL
GLUCOSE UR QL STRIP: NEGATIVE
HCT VFR BLD AUTO: 43.8 %
HGB BLD-MCNC: 14.2 G/DL
HGB UR QL STRIP: NEGATIVE
KETONES UR QL STRIP: ABNORMAL
LEUKOCYTE ESTERASE UR QL STRIP: NEGATIVE
LYMPHOCYTES # BLD AUTO: 1.5 K/UL
LYMPHOCYTES NFR BLD: 8 %
MCH RBC QN AUTO: 30.3 PG
MCHC RBC AUTO-ENTMCNC: 32.4 G/DL
MCV RBC AUTO: 94 FL
MONOCYTES # BLD AUTO: 1.9 K/UL
MONOCYTES NFR BLD: 9.7 %
NEUTROPHILS # BLD AUTO: 15.7 K/UL
NEUTROPHILS NFR BLD: 81.3 %
NITRITE UR QL STRIP: NEGATIVE
PH UR STRIP: 5 [PH] (ref 5–8)
PLATELET # BLD AUTO: 339 K/UL
PMV BLD AUTO: 11.5 FL
POTASSIUM SERPL-SCNC: 4.2 MMOL/L
PROT UR QL STRIP: NEGATIVE
RBC # BLD AUTO: 4.68 M/UL
SODIUM SERPL-SCNC: 140 MMOL/L
SP GR UR STRIP: 1.02 (ref 1–1.03)
URN SPEC COLLECT METH UR: ABNORMAL
UROBILINOGEN UR STRIP-ACNC: ABNORMAL EU/DL
WBC # BLD AUTO: 19.34 K/UL

## 2018-10-31 PROCEDURE — 81003 URINALYSIS AUTO W/O SCOPE: CPT

## 2018-10-31 PROCEDURE — 99284 EMERGENCY DEPT VISIT MOD MDM: CPT | Mod: 25

## 2018-10-31 PROCEDURE — 96372 THER/PROPH/DIAG INJ SC/IM: CPT

## 2018-10-31 PROCEDURE — 96374 THER/PROPH/DIAG INJ IV PUSH: CPT

## 2018-10-31 PROCEDURE — 85025 COMPLETE CBC W/AUTO DIFF WBC: CPT

## 2018-10-31 PROCEDURE — 63600175 PHARM REV CODE 636 W HCPCS: Performed by: EMERGENCY MEDICINE

## 2018-10-31 PROCEDURE — 80048 BASIC METABOLIC PNL TOTAL CA: CPT

## 2018-10-31 RX ORDER — METHYLPREDNISOLONE SOD SUCC 125 MG
125 VIAL (EA) INJECTION
Status: COMPLETED | OUTPATIENT
Start: 2018-10-31 | End: 2018-10-31

## 2018-10-31 RX ORDER — HYDROMORPHONE HYDROCHLORIDE 2 MG/ML
1 INJECTION, SOLUTION INTRAMUSCULAR; INTRAVENOUS; SUBCUTANEOUS
Status: DISCONTINUED | OUTPATIENT
Start: 2018-10-31 | End: 2018-10-31 | Stop reason: HOSPADM

## 2018-10-31 RX ORDER — KETOROLAC TROMETHAMINE 30 MG/ML
30 INJECTION, SOLUTION INTRAMUSCULAR; INTRAVENOUS
Status: COMPLETED | OUTPATIENT
Start: 2018-10-31 | End: 2018-10-31

## 2018-10-31 RX ORDER — HYDROMORPHONE HYDROCHLORIDE 2 MG/ML
1 INJECTION, SOLUTION INTRAMUSCULAR; INTRAVENOUS; SUBCUTANEOUS
Status: COMPLETED | OUTPATIENT
Start: 2018-10-31 | End: 2018-10-31

## 2018-10-31 RX ADMIN — METHYLPREDNISOLONE SODIUM SUCCINATE 125 MG: 125 INJECTION, POWDER, FOR SOLUTION INTRAMUSCULAR; INTRAVENOUS at 03:10

## 2018-10-31 RX ADMIN — KETOROLAC TROMETHAMINE 30 MG: 30 INJECTION, SOLUTION INTRAMUSCULAR at 02:10

## 2018-10-31 RX ADMIN — HYDROMORPHONE HYDROCHLORIDE 1 MG: 2 INJECTION, SOLUTION INTRAMUSCULAR; INTRAVENOUS; SUBCUTANEOUS at 12:10

## 2018-10-31 RX ADMIN — HYDROMORPHONE HYDROCHLORIDE 1 MG: 2 INJECTION, SOLUTION INTRAMUSCULAR; INTRAVENOUS; SUBCUTANEOUS at 01:10

## 2018-10-31 NOTE — DISCHARGE INSTRUCTIONS
Continue to use pain medication as needed.  Make an appointment to see your primary physician as well as your spine specialist to further manage her symptoms. Return to the emergency room for fever, inability to urinate, problems with bowel movements, numbness, weakness or any new, worsening or concerning symptoms.

## 2018-10-31 NOTE — ED PROVIDER NOTES
Encounter Date: 10/31/2018    SCRIBE #1 NOTE: I, Yaya Boss , am scribing for, and in the presence of,  Seth Ulloa MD . I have scribed the following portions of the note - Other sections scribed: HPI, ROS, PE .       History     Chief Complaint   Patient presents with    Back Pain     chronic back pain w/ radiation down legs x several days getting worse.  denies trauma.       CC: Back Pain    HPI: 57 y/o F who has a past medical history of COPD (chronic obstructive pulmonary disease) with emphysema, Lumbar DDD (degenerative disc disease), GERD (gastroesophageal reflux disease)  presents to the ED c/o acute onset of lower back spasms that radiates bilaterally to her lower extremities. Pt's spouse reports the pt has been having difficulty breathing. Pt reports similar symptoms in the past. Pt has had multiple back surgeries in the past. Pt denies chest pain, bowel or urinary symptoms.     HPI is limited b/c of severity of pt'jyotsna.         The history is provided by the patient and the spouse. No  was used.     Review of patient's allergies indicates:   Allergen Reactions    Hydrocodone Nausea And Vomiting and Other (See Comments)     Other reaction(s): upsets her stomach; severe abdominal cramping    Oxycodone Nausea And Vomiting     Other reaction(s): upsets stomach; severe abdominal cramps     Past Medical History:   Diagnosis Date    Colon polyp 10/3/2013    COPD (chronic obstructive pulmonary disease) with emphysema     DDD (degenerative disc disease), lumbar     GERD (gastroesophageal reflux disease)     Pulmonary nodules     Vitamin B 12 deficiency     bets B12 shots     Past Surgical History:   Procedure Laterality Date    BACK SURGERY      times 2 lumbar    HYSTERECTOMY      complete     Family History   Problem Relation Age of Onset    Cancer Mother         ovarian    Ovarian cancer Mother     Heart disease Father         CHF    Cancer Sister         breast and vulvar     Breast cancer Sister     Heart disease Brother         Myocarditis    Diabetes Mellitus Neg Hx     Stroke Neg Hx      Social History     Tobacco Use    Smoking status: Current Every Day Smoker     Packs/day: 1.00     Years: 35.00     Pack years: 35.00     Types: Cigarettes     Start date: 5/22/1976    Smokeless tobacco: Current User   Substance Use Topics    Alcohol use: Yes     Comment: occasional    Drug use: No     Review of Systems   Constitutional: Negative for activity change, appetite change, chills, diaphoresis and fever.   HENT: Negative for congestion, drooling, ear pain, mouth sores, rhinorrhea, sinus pain, sore throat and trouble swallowing.    Eyes: Negative for pain and discharge.   Respiratory: Positive for cough (chronic) and shortness of breath (chronic). Negative for chest tightness and stridor.         (+) Difficulty Breathing     Cardiovascular: Negative for chest pain, palpitations and leg swelling.   Gastrointestinal: Negative for abdominal distention, abdominal pain, blood in stool, constipation, diarrhea, nausea and vomiting.   Genitourinary: Negative for difficulty urinating, dysuria, flank pain, frequency, hematuria and urgency.   Musculoskeletal: Positive for back pain. Negative for arthralgias and myalgias.   Skin: Negative for pallor, rash and wound.   Neurological: Negative for dizziness, syncope, weakness, light-headedness and numbness.   All other systems reviewed and are negative.      Physical Exam     Initial Vitals [10/31/18 1152]   BP Pulse Resp Temp SpO2   130/66 (!) 124 (!) 22 98.4 °F (36.9 °C) (!) 93 %      MAP       --         Physical Exam    Nursing note and vitals reviewed.  Constitutional: She is not diaphoretic. She appears distressed (painful).   HENT:   Head: Normocephalic and atraumatic.   Mouth/Throat: Oropharynx is clear and moist.   Eyes: EOM are normal. Pupils are equal, round, and reactive to light. No scleral icterus.   Neck: Normal range of motion.  Neck supple. No JVD present.   Cardiovascular: Normal rate and regular rhythm.   2+ DP and PT pulses   Pulmonary/Chest: No stridor. No respiratory distress.   Breathing comfortably on home O2 does  Coarse breath sounds bilaterally   Abdominal: Soft. Bowel sounds are normal. She exhibits no distension. There is no tenderness.   Musculoskeletal: Normal range of motion. She exhibits no edema.        Lumbar back: She exhibits tenderness (Paraspinal ).   Neurological: She is alert and oriented to person, place, and time. She has normal strength. No cranial nerve deficit or sensory deficit.   Full strength and range of motion at bilateral hips knees ankles.   5/5 EHL, FHL, TA, GSC  Sensation intact to light touch throughout   Skin: Skin is warm and dry.   Psychiatric: She has a normal mood and affect.         ED Course   Procedures  Labs Reviewed   CBC W/ AUTO DIFFERENTIAL - Abnormal; Notable for the following components:       Result Value    WBC 19.34 (*)     Gran # (ANC) 15.7 (*)     Mono # 1.9 (*)     Gran% 81.3 (*)     Lymph% 8.0 (*)     All other components within normal limits   BASIC METABOLIC PANEL - Abnormal; Notable for the following components:    CO2 31 (*)     Glucose 122 (*)     All other components within normal limits   URINALYSIS, REFLEX TO URINE CULTURE - Abnormal; Notable for the following components:    Ketones, UA Trace (*)     Urobilinogen, UA 2.0-3.0 (*)     All other components within normal limits    Narrative:     Preferred Collection Type->Urine, Clean Catch          Imaging Results          CT Lumbar Spine Without Contrast (Final result)  Result time 10/31/18 15:27:56    Final result by To Ram MD (10/31/18 15:27:56)                 Impression:      1. No acute displaced fracture or dislocation of the lumbar spine noting degenerative changes and postsurgical changes as described above.      Electronically signed by: To Ram MD  Date:    10/31/2018  Time:    15:27              Narrative:    EXAMINATION:  CT LUMBAR SPINE WITHOUT CONTRAST    CLINICAL HISTORY:  low back pain, hx of prior surgery;    TECHNIQUE:  Low-dose axial, sagittal and coronal reformations are obtained through the lumbar spine.  Contrast was not administered.    COMPARISON:  None.    FINDINGS:  Images of the lower thorax are unremarkable.  The visualized portions of the liver, spleen, and adrenal glands are unremarkable.  There is right nonobstructive nephrolithiasis versus vascular calcification.  The visualized ureters are grossly unremarkable.  There is atherosclerotic calcification of the aorta and its branches.  The paraspinous soft tissues are grossly unremarkable.    There is postoperative change of left L5 hemilaminectomy.  Sagittal reformatted imaging demonstrates adequate alignment of the lumbar spine noting disc space height loss and degenerative endplate change involving L5-S1 with degenerative disc disease.  There is multilevel facet arthropathy.  No acute displaced fracture or dislocation of the lumbar spine.    L1-L2: There is a mild broad-based disc bulge without significant spinal canal stenosis or neuroforaminal narrowing.    L2-L3: There is a broad-based disc bulge resulting in mild spinal canal stenosis without significant neuroforaminal narrowing.    L3-L4: There is a broad-based disc bulge, in combination with ligamentum flavum hypertrophy results in mild spinal canal stenosis.  There is mild right neuroforaminal narrowing.    L4-L5: There is a broad-based disc bulge which in combination with ligamentum flavum hypertrophy results in mild spinal canal stenosis.  There is bilateral mild neuroforaminal narrowing.    L5-S1: There is a broad-based disc bulge, asymmetric to the left resulting in mild-to-moderate spinal canal stenosis and mild to moderate left neuroforaminal narrowing.                                 Medical Decision Making:   History:   Old Medical Records: I decided to obtain old medical  records.  Old Records Summarized: records from previous admission(s).       <> Summary of Records: Pt's last ED visit was 2/13/17 for SOB  Differential Diagnosis:   Exacerbation of chronic back pain  Sciatica  Spondylolisthesis  Lumbar radiculopathy  Low clinical suspicion of cauda equina syndrome saddle anesthesia, bowel or bladder symptoms   low clinical suspicion of epidural abscess given lack of constitutional symptoms    Clinical Tests:   Lab Tests: Ordered and Reviewed  Radiological Study: Ordered and Reviewed  ED Management:  Patient in acute painful distress but without focal neurological deficits.  Distal pulses are intact I have low clinical suspicion of limb ischemia limb or dissection.  No evidence of cauda equina syndrome.  She has full strength and sensation bilateral lower extremities.  UA without evidence of UTI or occult blood to suggest renal stone.  Patient given Dilaudid and Toradol with significant improvement in symptoms see to you lumbar spine does not demonstrate acute traumatic injury. Patient is hemodynamically stable and fit for discharge to follow up with primary physician and pain management doctor and her orthopedist.  She reports having adequate tramadol at home to continue to manage her symptoms. Patient and  Counseled on supportive care and appropriate medication usage. Dicussed extensively concerning symptoms for which to return to ER and the importance of follow up . Patient expressed understanding and agreement with treatment plan.   This chart completed using dictation software, as a result there may be some typographical errors.             Scribe Attestation:   Scribe #1: I performed the above scribed service and the documentation accurately describes the services I performed. I attest to the accuracy of the note.    Attending Attestation:           Physician Attestation for Scribe:  Physician Attestation Statement for Scribe #1: I, Seth Ulloa MD , reviewed  documentation, as scribed by Yaya Boss  in my presence, and it is both accurate and complete.                    Clinical Impression:   The primary encounter diagnosis was DDD (degenerative disc disease), lumbar. A diagnosis of Acute bilateral low back pain with bilateral sciatica was also pertinent to this visit.      Disposition:   Disposition: Discharged  Condition: Stable                        Seth Ulloa MD  11/02/18 [id number]

## 2018-10-31 NOTE — ED NOTES
Past Medical History:   Diagnosis Date    Colon polyp 10/3/2013    COPD (chronic obstructive pulmonary disease) with emphysema     DDD (degenerative disc disease), lumbar     GERD (gastroesophageal reflux disease)     Pulmonary nodules     Vitamin B 12 deficiency     bets B12 shots   Pt presented to ed per ems with lower back spasms rated 10/10 that came on this am. Pt moaning and crying reports the spasms came on this am after taking a bath.  Pt is bed ridden due to copd, and report history of back surgery 8 years ago.

## 2018-11-01 NOTE — ED NOTES
The order that was on  Hold was scanned as given but the computer didn't register the documentation.

## 2018-11-07 DIAGNOSIS — N95.1 MENOPAUSAL STATE: ICD-10-CM

## 2018-11-07 DIAGNOSIS — N95.1 SYMPTOMATIC MENOPAUSAL OR FEMALE CLIMACTERIC STATES: ICD-10-CM

## 2018-11-12 RX ORDER — ESTRADIOL 1 MG/1
TABLET ORAL
Qty: 30 TABLET | Refills: 0 | OUTPATIENT
Start: 2018-11-12

## 2018-11-30 ENCOUNTER — TELEPHONE (OUTPATIENT)
Dept: OBSTETRICS AND GYNECOLOGY | Facility: CLINIC | Age: 58
End: 2018-11-30

## 2018-12-03 DIAGNOSIS — F32.89 OTHER DEPRESSION: ICD-10-CM

## 2018-12-03 RX ORDER — PAROXETINE HYDROCHLORIDE 20 MG/1
TABLET, FILM COATED ORAL
Qty: 90 TABLET | Refills: 0 | Status: SHIPPED | OUTPATIENT
Start: 2018-12-03 | End: 2019-04-17

## 2018-12-05 ENCOUNTER — TELEPHONE (OUTPATIENT)
Dept: FAMILY MEDICINE | Facility: CLINIC | Age: 58
End: 2018-12-05

## 2018-12-05 DIAGNOSIS — J42 CHRONIC BRONCHITIS, UNSPECIFIED CHRONIC BRONCHITIS TYPE: Primary | ICD-10-CM

## 2018-12-05 RX ORDER — DOXYCYCLINE HYCLATE 100 MG
100 TABLET ORAL 2 TIMES DAILY
Qty: 14 TABLET | Refills: 0 | Status: SHIPPED | OUTPATIENT
Start: 2018-12-05 | End: 2019-04-17

## 2018-12-05 NOTE — TELEPHONE ENCOUNTER
"----- Message from Elena Ballesteros sent at 12/5/2018  9:04 AM CST -----  Contact: Delilah "daughter" [phone]  Pt came down with bronchitis and can't make it in the office. Please send a Rx in for some antibiotics   .  [facility] [address] [place] [address] [place] - [address] AT [facility] RIA ARCHIBALD  [address]  [place] [address]  Phone: [phone] Fax: [phone]      "

## 2018-12-05 NOTE — TELEPHONE ENCOUNTER
Patient's daughter requesting antibiotics for patient's bronchitis called into the pharmacy. Please advise.

## 2018-12-12 ENCOUNTER — TELEPHONE (OUTPATIENT)
Dept: OBSTETRICS AND GYNECOLOGY | Facility: CLINIC | Age: 58
End: 2018-12-12

## 2018-12-12 DIAGNOSIS — N95.1 MENOPAUSAL STATE: Primary | ICD-10-CM

## 2018-12-12 RX ORDER — ESTRADIOL 1 MG/1
1 TABLET ORAL DAILY
Qty: 30 TABLET | Refills: 3 | Status: SHIPPED | OUTPATIENT
Start: 2018-12-12 | End: 2019-03-27 | Stop reason: SDUPTHER

## 2018-12-12 NOTE — TELEPHONE ENCOUNTER
12/12/18 @ 2601 (GRETCHEN)  SPOKE WITH MS LUDWIG SHE STATED THAT SHE HAS BROKEN HER ARM AND IS UNABLE TO COME IN FOR AN OFFICE VISIT, BUT SHE NEEDS HER ESTRACE 1 MG REORDERED BECAUSE SHE IS OUT , CAN YOU PLEASE GIVE HER A 3 MONTH SUPPLY UNTIL SHE CAN MAKE AN OFFICE VISIT TO SEE YOU ,   MESSAGE SENT TO DR HAMM

## 2019-01-08 ENCOUNTER — OFFICE VISIT (OUTPATIENT)
Dept: FAMILY MEDICINE | Facility: CLINIC | Age: 59
End: 2019-01-08
Payer: COMMERCIAL

## 2019-01-08 VITALS
OXYGEN SATURATION: 89 % | HEIGHT: 63 IN | HEART RATE: 121 BPM | SYSTOLIC BLOOD PRESSURE: 120 MMHG | TEMPERATURE: 99 F | WEIGHT: 99.19 LBS | DIASTOLIC BLOOD PRESSURE: 78 MMHG | BODY MASS INDEX: 17.57 KG/M2 | RESPIRATION RATE: 24 BRPM

## 2019-01-08 DIAGNOSIS — J44.1 COPD WITH ACUTE EXACERBATION: Primary | ICD-10-CM

## 2019-01-08 PROCEDURE — 99214 OFFICE O/P EST MOD 30 MIN: CPT | Mod: S$GLB,,, | Performed by: NURSE PRACTITIONER

## 2019-01-08 PROCEDURE — 99999 PR PBB SHADOW E&M-EST. PATIENT-LVL III: CPT | Mod: PBBFAC,,, | Performed by: NURSE PRACTITIONER

## 2019-01-08 PROCEDURE — 99214 PR OFFICE/OUTPT VISIT, EST, LEVL IV, 30-39 MIN: ICD-10-PCS | Mod: S$GLB,,, | Performed by: NURSE PRACTITIONER

## 2019-01-08 PROCEDURE — 3008F PR BODY MASS INDEX (BMI) DOCUMENTED: ICD-10-PCS | Mod: CPTII,S$GLB,, | Performed by: NURSE PRACTITIONER

## 2019-01-08 PROCEDURE — 99999 PR PBB SHADOW E&M-EST. PATIENT-LVL III: ICD-10-PCS | Mod: PBBFAC,,, | Performed by: NURSE PRACTITIONER

## 2019-01-08 PROCEDURE — 3008F BODY MASS INDEX DOCD: CPT | Mod: CPTII,S$GLB,, | Performed by: NURSE PRACTITIONER

## 2019-01-08 RX ORDER — PREDNISONE 20 MG/1
20 TABLET ORAL DAILY
Qty: 5 TABLET | Refills: 0 | Status: SHIPPED | OUTPATIENT
Start: 2019-01-08 | End: 2019-01-13

## 2019-01-08 RX ORDER — PROMETHAZINE HYDROCHLORIDE AND DEXTROMETHORPHAN HYDROBROMIDE 6.25; 15 MG/5ML; MG/5ML
5 SYRUP ORAL EVERY 12 HOURS PRN
Qty: 118 ML | Refills: 0 | Status: SHIPPED | OUTPATIENT
Start: 2019-01-08 | End: 2019-01-18

## 2019-01-08 NOTE — PROGRESS NOTES
Routine Office Visit    Patient Name: Renée Lucas    : 1960  MRN: [id number]    Chief Complaint:  Chest congestion, cough    Subjective:  Renée is a 58 y.o. female who presents today for:    1. Cough and chest congestion that started two weeks ago. She has a significant history of COPD currently taking prednisone 5mg daily, Symbicort, spiriva, and singulair daily as well. She was recently treated for bronchitis with doxycycline states that originally her cough improved with the abx but two days after she stopped the abx the cough worsened again. She denies any worsening dyspnea but does report chest tightness. She states she is supposed to be using albuterol breathing treatments daily but she is not using this consistently.  She denies f/c/n/v/d, denies chest pain, denies palpitations.  She is currently still on 2L nasal cannula at home.   She continues to smoke 1/2 - 1 ppd.     2. Of note, she also reports some LLQ abd pain that is aching in nature which she attributes to coughing so much. She denies any GI complaints otherwise, no NVD. Pain started 1 week ago and worsens with cough.     Past Medical History  Past Medical History:   Diagnosis Date    Colon polyp 10/3/2013    COPD (chronic obstructive pulmonary disease) with emphysema     DDD (degenerative disc disease), lumbar     GERD (gastroesophageal reflux disease)     Pulmonary nodules     Vitamin B 12 deficiency     bets B12 shots       Past Surgical History  Past Surgical History:   Procedure Laterality Date    BACK SURGERY      times 2 lumbar    HYSTERECTOMY      complete       Family History  Family History   Problem Relation Age of Onset    Cancer Mother         ovarian    Ovarian cancer Mother     Heart disease Father         CHF    Cancer Sister         breast and vulvar    Breast cancer Sister     Heart disease Brother         Myocarditis    Diabetes Mellitus Neg Hx     Stroke Neg Hx        Social History  Social History      Socioeconomic History    Marital status:      Spouse name: Not on file    Number of children: 2    Years of education: 12    Highest education level: Not on file   Social Needs    Financial resource strain: Not on file    Food insecurity - worry: Not on file    Food insecurity - inability: Not on file    Transportation needs - medical: Not on file    Transportation needs - non-medical: Not on file   Occupational History    Occupation:      Employer: [employer]   Tobacco Use    Smoking status: Current Every Day Smoker     Packs/day: 1.00     Years: 35.00     Pack years: 35.00     Types: Cigarettes     Start date: 5/22/1976    Smokeless tobacco: Current User   Substance and Sexual Activity    Alcohol use: Yes     Comment: occasional    Drug use: No    Sexual activity: Yes     Partners: Male   Other Topics Concern    Not on file   Social History Narrative    Not on file       Current Medications  Current Outpatient Medications on File Prior to Visit   Medication Sig Dispense Refill    acetaminophen (TYLENOL) 500 MG tablet Take 1,500 mg by mouth as needed for Pain.      aclidinium bromide (TUDORZA PRESSAIR) 400 mcg/actuation AePB Inhale 1 puff into the lungs 2 (two) times daily.      azelastine (ASTELIN) 137 mcg (0.1 %) nasal spray SPRAY TWICE IN NASAL BID  3    benzonatate (TESSALON) 200 MG capsule Take 1 capsule (200 mg total) by mouth 3 (three) times daily as needed. 90 capsule 0    BUDESONIDE/FORMOTEROL FUMARATE (SYMBICORT INHL) Inhale 2 puffs into the lungs 2 (two) times daily.       cyclobenzaprine (FLEXERIL) 10 MG tablet TK 1 T PO TID  0    DALIRESP 500 mcg Tab Take 500 mcg by mouth once daily.   6    doxycycline (VIBRA-TABS) 100 MG tablet Take 1 tablet (100 mg total) by mouth 2 (two) times daily. 14 tablet 0    DYMISTA 137-50 mcg/spray Spry 1 puff by Each Nare route 2 (two) times daily.   6    estradiol (ESTRACE) 1 MG tablet Take 1 tablet (1 mg total)  by mouth once daily. 30 tablet 11    estradiol (ESTRACE) 1 MG tablet Take 1 tablet (1 mg total) by mouth once daily. 30 tablet 3    fluticasone (FLONASE) 50 mcg/actuation nasal spray SPRAY TWICE NASAL DAILY  3    gabapentin (NEURONTIN) 300 MG capsule TK 1 C PO TID  12    GUAIATUSSIN AC  mg/5 mL syrup Take 5 mLs by mouth every 8 (eight) hours as needed.   1    ketoconazole (NIZORAL) 2 % cream Apply topically 2 (two) times daily. To the neck for 2-4 weeks. (Patient taking differently: Apply 1 application topically 2 (two) times daily. To the neck for 2-4 weeks.) 60 g 1    lansoprazole (PREVACID) 15 MG capsule Take 15 mg by mouth as needed.      levalbuterol (XOPENEX HFA) 45 mcg/actuation inhaler Inhale 1-2 puffs into the lungs every 4 (four) hours as needed for Wheezing. Rescue      levalbuterol (XOPENEX) 0.63 mg/3 mL nebulizer solution Take 1 ampule by nebulization every 4 (four) hours as needed for Wheezing. Rescue      metroNIDAZOLE (FLAGYL) 500 MG tablet TK 1 T PO TID TAT  0    mirtazapine (REMERON) 15 MG tablet Take 1 tablet (15 mg total) by mouth every evening. 30 tablet 5    montelukast (SINGULAIR) 10 mg tablet Take 10 mg by mouth every evening.   5    nystatin (MYCOSTATIN) 100,000 unit/mL suspension TK 1 TEA PO QID FOR 14 DAYS  0    paroxetine (PAXIL) 20 MG tablet Take 1 tablet (20 mg total) by mouth once daily. 90 tablet 2    paroxetine (PAXIL) 20 MG tablet TAKE 1 TABLET(20 MG) BY MOUTH EVERY MORNING 90 tablet 0    penicillin v potassium (VEETID) 500 MG tablet TK 2 TS PO NOW THEN 1 T PO QID TAT  0    predniSONE (DELTASONE) 20 MG tablet Two tabs (40mg) po daily x five days, then one tab (20mg) po daily x 5 days 15 tablet 0    predniSONE (DELTASONE) 5 MG tablet       PROAIR HFA 90 mcg/actuation inhaler INHALE 2 PUFFS PO Q 4 TO 6 H PRF SOB / WHEEZING  6    SPIRIVA RESPIMAT 2.5 mcg/actuation Mist INL 2 PUFFS PO D  6    SYMBICORT 160-4.5 mcg/actuation HFAA   6    tramadol (ULTRAM) 50 mg  tablet TK 1 TO 2 TS PO Q 4 TO 6 H PRN P  0    estradiol valerate (DELESTROGEN) 40 mg/mL injection Inject 1 mL (40 mg total) into the muscle every 28 days. 5 mL 5    testosterone cypionate (DEPOTESTOTERONE CYPIONATE) 200 mg/mL injection Inject 1 mL (200 mg total) into the muscle every 28 days. 5 mL 5     Current Facility-Administered Medications on File Prior to Visit   Medication Dose Route Frequency Provider Last Rate Last Dose    clotrimazole-betamethasone 1-0.05% cream   Topical BID Ruben Blackwell MD        cyanocobalamin injection 1,000 mcg  1,000 mcg Intramuscular Q30 Days Ruben Blackwell MD   1,000 mcg at 08/09/16 1503    estradiol valerate (DELESTROGEN) injection 20 mg/mL  20 mg Intramuscular Once Ruben Blackwell MD        estradiol valerate (DELESTROGEN) injection 20 mg/mL  20 mg Intramuscular Q30 Days Ruben Blackwell MD   20 mg at 08/09/16 1502    estradiol valerate injection 20 mg  20 mg Intramuscular Q21 Days Ruben Blackwell MD   20 mg at 08/10/15 1107    estradiol valerate injection 20 mg  20 mg Intramuscular Q21 Days Ruben Blackwell MD   20 mg at 08/09/16 1501       Allergies   Review of patient's allergies indicates:   Allergen Reactions    Hydrocodone Nausea And Vomiting and Other (See Comments)     Other reaction(s): upsets her stomach; severe abdominal cramping    Oxycodone Nausea And Vomiting     Other reaction(s): upsets stomach; severe abdominal cramps       Review of Systems (Pertinent positives)  Review of Systems   Constitutional: Negative for chills, diaphoresis, fever, malaise/fatigue and weight loss.   HENT: Negative.    Eyes: Negative.    Respiratory: Positive for cough, sputum production and wheezing. Negative for hemoptysis and shortness of breath.    Cardiovascular: Negative for chest pain, palpitations and orthopnea.   Gastrointestinal: Positive for abdominal pain. Negative for heartburn, nausea and vomiting.        LLQ   Genitourinary: Negative.    Musculoskeletal: Negative  "for back pain, joint pain, myalgias and neck pain.   Skin: Negative.    Neurological: Negative.  Negative for weakness.   Endo/Heme/Allergies: Negative.    Psychiatric/Behavioral: Negative.      /78 (BP Location: Left arm, Patient Position: Sitting, BP Method: Medium (Manual))   Pulse (!) 121   Temp 98.8 °F (37.1 °C) (Oral)   Resp (!) 24   Ht 5' 3" (1.6 m)   Wt 45 kg (99 lb 3.3 oz)   SpO2 (!) 89%   BMI 17.57 kg/m²     Physical Exam   Constitutional: She is oriented to person, place, and time. She appears well-developed and well-nourished. She appears distressed.   Eyes: Conjunctivae and EOM are normal. Pupils are equal, round, and reactive to light.   Neck: Normal range of motion. Neck supple.   Cardiovascular: Normal rate, regular rhythm, normal heart sounds and intact distal pulses. Exam reveals no gallop and no friction rub.   No murmur heard.  Pulmonary/Chest: No accessory muscle usage. No tachypnea. No respiratory distress. She has decreased breath sounds in the right middle field, the right lower field, the left middle field and the left lower field. She has no wheezes. She has no rhonchi. She has no rales.   Abdominal: Soft. Normal appearance and bowel sounds are normal. There is no hepatosplenomegaly. There is no rigidity, no rebound, no guarding, no CVA tenderness, no tenderness at McBurney's point and negative Gallagher's sign. Hernia confirmed negative in the ventral area.   Musculoskeletal: Normal range of motion.   Neurological: She is alert and oriented to person, place, and time.   Skin: Skin is warm and dry. Capillary refill takes less than 2 seconds. She is not diaphoretic.        Assessment/Plan:  Renée Lucas is a 58 y.o. female who presents today for :    Renée was seen today for chest congestion, productive cough, and pain left abdomen.    Diagnoses and all orders for this visit:    COPD with acute exacerbation  -     predniSONE (DELTASONE) 20 MG tablet; Take 1 tablet (20 mg " total) by mouth once daily. for 5 days  -     X-Ray Chest PA And Lateral; Future  -     promethazine-dextromethorphan (PROMETHAZINE-DM) 6.25-15 mg/5 mL Syrp; Take 5 mLs by mouth every 12 (twelve) hours as needed.    She continues to have symptoms despite being on numerous medications daily for her COPD.  Will treat with burst steroids for 5 days d/t cough worsening from baseline.  Will check CXR for infectious process.  I counseled her on smoking cessation and the benefits of stopping with COPD.  I emphasized to her the importance of taking her albuterol breathing treatments as prescribed.  Do not take 5mg prednisone with the 20mg.  She is to f/u [facility] at pulmonology.   Abd exam WNL - likely muscle strain from coughing. May use tylenol for this.         My collaborating physician is Dr. Pete Malhotra.

## 2019-01-24 ENCOUNTER — HOSPITAL ENCOUNTER (EMERGENCY)
Facility: HOSPITAL | Age: 59
Discharge: HOME | End: 2019-01-24
Attending: EMERGENCY MEDICINE
Payer: COMMERCIAL

## 2019-01-24 VITALS
BODY MASS INDEX: 17.54 KG/M2 | HEIGHT: 63 IN | WEIGHT: 99 LBS | HEART RATE: 115 BPM | RESPIRATION RATE: 20 BRPM | DIASTOLIC BLOOD PRESSURE: 71 MMHG | TEMPERATURE: 98 F | OXYGEN SATURATION: 96 % | SYSTOLIC BLOOD PRESSURE: 139 MMHG

## 2019-01-24 DIAGNOSIS — M51.36 DDD (DEGENERATIVE DISC DISEASE), LUMBAR: Primary | ICD-10-CM

## 2019-01-24 PROCEDURE — 96372 THER/PROPH/DIAG INJ SC/IM: CPT

## 2019-01-24 PROCEDURE — 99284 EMERGENCY DEPT VISIT MOD MDM: CPT | Mod: 25

## 2019-01-24 PROCEDURE — 63600175 PHARM REV CODE 636 W HCPCS: Performed by: EMERGENCY MEDICINE

## 2019-01-24 RX ORDER — KETOROLAC TROMETHAMINE 30 MG/ML
15 INJECTION, SOLUTION INTRAMUSCULAR; INTRAVENOUS
Status: COMPLETED | OUTPATIENT
Start: 2019-01-24 | End: 2019-01-24

## 2019-01-24 RX ORDER — PROMETHAZINE HYDROCHLORIDE 6.25 MG/5ML
25 SYRUP ORAL EVERY 6 HOURS PRN
COMMUNITY
End: 2019-04-17

## 2019-01-24 RX ADMIN — KETOROLAC TROMETHAMINE 15 MG: 30 INJECTION, SOLUTION INTRAMUSCULAR at 10:01

## 2019-01-24 RX ADMIN — KETOROLAC TROMETHAMINE 15 MG: 30 INJECTION, SOLUTION INTRAMUSCULAR at 08:01

## 2019-01-24 NOTE — ED TRIAGE NOTES
Pt arrives to er via personal vehicle with family from home. Pt represents backs spasm since yesterday. Pt states took gabentin and flexiril at 0600 this morning. Pain 7/10. Denies chest pain, sob,dizzy, n/v/d. Denies falls any trauma  g. No distress noted. Pt standing states feel better when standing.

## 2019-01-24 NOTE — DISCHARGE INSTRUCTIONS
Continue your medications as you have been prescribed.  Make an appointment to see her primary physician as well as your pain management doctor for general checkup and to further monitor manage her symptoms. Return to emergency room for fever, chest pain, breathing difficulty, numbness, weakness, difficulty with bowel movements or urination or any new, worsening or concerning symptoms.

## 2019-01-24 NOTE — ED PROVIDER NOTES
"Encounter Date: 1/24/2019    SCRIBE #1 NOTE: I, EdeGini Nando, am scribing for, and in the presence of,  Seth Ulloa MD. I have scribed the following portions of the note - Other sections scribed: HPI, ROS, PE.       History     Chief Complaint   Patient presents with    Back Pain     back spasms radiating down both legs started yesterday; hx of sciatica; wears home O2 2L at all times, hx of COPD     CC: Back Pain    59 y/o female with COPD, lumbar DDD, Vitamin B 12 deficiency, and x2 back surgery (L5 and S1) presents to the ED for emergent evaluation of acute onset lumbar back pain that radiates down R buttocks and down RLE that started yesterday. The pain is severe (8/10) and worse with palpation. The patient states the pain feels similar to her chronic back pain flares. The patient is standing up during examination; the patient's family member states "she's usually not up a lot." The patient attempted her prescribed Neurontin and Flexeril (last dose at 6:30AM today) with mild relief to her symptoms. The patient presented to this facility on 10/31/18 for similar symptoms, where she was given toradol with relief to her pain. The patient denies any recent kidney problems. The patient denies any recent trauma, fall, or injury. The patient denies chest pain, SOB, or fever. No other symptoms reported.      The history is provided by the patient and a relative. No  was used.     Review of patient's allergies indicates:   Allergen Reactions    Hydrocodone Nausea And Vomiting and Other (See Comments)     Other reaction(s): upsets her stomach; severe abdominal cramping    Oxycodone Nausea And Vomiting     Other reaction(s): upsets stomach; severe abdominal cramps     Past Medical History:   Diagnosis Date    Colon polyp 10/3/2013    COPD (chronic obstructive pulmonary disease) with emphysema     DDD (degenerative disc disease), lumbar     GERD (gastroesophageal reflux disease)     Pulmonary " nodules     Vitamin B 12 deficiency     bets B12 shots     Past Surgical History:   Procedure Laterality Date    BACK SURGERY      times 2 lumbar    HYSTERECTOMY      complete     Family History   Problem Relation Age of Onset    Cancer Mother         ovarian    Ovarian cancer Mother     Heart disease Father         CHF    Cancer Sister         breast and vulvar    Breast cancer Sister     Heart disease Brother         Myocarditis    Diabetes Mellitus Neg Hx     Stroke Neg Hx      Social History     Tobacco Use    Smoking status: Current Every Day Smoker     Packs/day: 1.00     Years: 35.00     Pack years: 35.00     Types: Cigarettes     Start date: 5/22/1976    Smokeless tobacco: Current User   Substance Use Topics    Alcohol use: Yes     Comment: occasional    Drug use: No     Review of Systems   Constitutional: Negative for chills and fever.   HENT: Negative for congestion, ear pain, rhinorrhea and sore throat.    Eyes: Negative for redness.   Respiratory: Negative for cough and shortness of breath.    Cardiovascular: Negative for chest pain.   Gastrointestinal: Negative for abdominal pain, diarrhea, nausea and vomiting.   Genitourinary: Negative for decreased urine volume, difficulty urinating, dysuria, frequency, hematuria and urgency.   Musculoskeletal: Positive for back pain (lumbar that radiates down R buttocks and down RLE). Negative for neck pain.   Skin: Negative for rash.   Neurological: Negative for headaches.   Psychiatric/Behavioral: Negative for confusion.   All other systems reviewed and are negative.      Physical Exam     Initial Vitals [01/24/19 0734]   BP Pulse Resp Temp SpO2   132/79 (!) 124 20 97.7 °F (36.5 °C) (!) 91 %      MAP       --         Physical Exam    Nursing note and vitals reviewed.  Constitutional: She is not diaphoretic. No distress.   Restless and anxious appearing.   HENT:   Head: Normocephalic and atraumatic.   Mouth/Throat: Oropharynx is clear and moist.    Eyes: EOM are normal. Pupils are equal, round, and reactive to light. No scleral icterus.   Neck: Normal range of motion. Neck supple. No JVD present.   Cardiovascular: Regular rhythm. Tachycardia present.    Pulmonary/Chest: Breath sounds normal. No stridor. No respiratory distress.   Decreased air entry bilaterally.   Abdominal: Soft. Bowel sounds are normal. She exhibits no distension. There is no tenderness.   Musculoskeletal: Normal range of motion. She exhibits no edema or tenderness.   Lumbar paraspinal tenderness.   Neurological: She is alert and oriented to person, place, and time. She has normal strength. No cranial nerve deficit or sensory deficit.   Skin: Skin is warm and dry.   Psychiatric: She has a normal mood and affect.         ED Course   Procedures  Labs Reviewed - No data to display       Imaging Results    None          Medical Decision Making:   ED Management:  Patient appears uncomfortable at time history and physical but is not acutely distressed.  She has no focal neurological deficits.  She is moving all extremities. She has paraspinal lumbar tenderness with radicular symptoms. She does not have fever.  Patient is noted be tachycardic this is likely due to pain. She denies chest pain or any worse shortness of breath than usual.  Patient given IM Toradol with resolution of symptoms. Recent renal function is within normal limits. Patient appears able to tolerate short course of Toradol.  On reassessment patient reports that pain has improved.  Prior to discharge she reported pain had begun to return. she has been given another dose of Toradol.  She reports having adequate medication at home to manage her symptoms.  She is stable to be discharged to follow up with her primary physician as well as her pain management specialist for  This chart was completed using dictation software, as a result there may be some typographical errors.             Scribe Attestation:   Scribe #1: I performed  the above scribed service and the documentation accurately describes the services I performed. I attest to the accuracy of the note.    Attending Attestation:           Physician Attestation for Scribe:  Physician Attestation Statement for Scribe #1: I, Seth Ulloa MD, reviewed documentation, as scribed by Dolores Collier in my presence, and it is both accurate and complete.                    Clinical Impression:   The encounter diagnosis was DDD (degenerative disc disease), lumbar.      Disposition:   Disposition: Discharged  Condition: Stable                        Seth Ulloa MD  01/24/19 1946

## 2019-03-14 DIAGNOSIS — F33.41 RECURRENT MAJOR DEPRESSIVE DISORDER, IN PARTIAL REMISSION: ICD-10-CM

## 2019-03-14 RX ORDER — MIRTAZAPINE 15 MG/1
TABLET, FILM COATED ORAL
Qty: 90 TABLET | Refills: 0 | Status: SHIPPED | OUTPATIENT
Start: 2019-03-14 | End: 2019-03-27 | Stop reason: SDUPTHER

## 2019-03-19 RX ORDER — ESTRADIOL 1 MG/1
TABLET ORAL
Qty: 30 TABLET | Refills: 0 | OUTPATIENT
Start: 2019-03-19

## 2019-03-27 ENCOUNTER — OFFICE VISIT (OUTPATIENT)
Dept: OBSTETRICS AND GYNECOLOGY | Facility: CLINIC | Age: 59
End: 2019-03-27
Payer: MEDICARE

## 2019-03-27 ENCOUNTER — LAB VISIT (OUTPATIENT)
Dept: LAB | Facility: HOSPITAL | Age: 59
End: 2019-03-27
Attending: OBSTETRICS & GYNECOLOGY
Payer: MEDICARE

## 2019-03-27 VITALS
SYSTOLIC BLOOD PRESSURE: 122 MMHG | HEIGHT: 63 IN | BODY MASS INDEX: 17.54 KG/M2 | DIASTOLIC BLOOD PRESSURE: 60 MMHG | WEIGHT: 99 LBS

## 2019-03-27 DIAGNOSIS — N95.1 MENOPAUSAL STATE: ICD-10-CM

## 2019-03-27 DIAGNOSIS — F33.41 RECURRENT MAJOR DEPRESSIVE DISORDER, IN PARTIAL REMISSION: ICD-10-CM

## 2019-03-27 DIAGNOSIS — Z00.00 ANNUAL PHYSICAL EXAM: Primary | ICD-10-CM

## 2019-03-27 DIAGNOSIS — Z90.710 STATUS POST HYSTERECTOMY: ICD-10-CM

## 2019-03-27 DIAGNOSIS — J44.9 CHRONIC OBSTRUCTIVE PULMONARY DISEASE, UNSPECIFIED COPD TYPE: ICD-10-CM

## 2019-03-27 DIAGNOSIS — Z01.419 ENCOUNTER FOR GYNECOLOGICAL EXAMINATION WITHOUT ABNORMAL FINDING: ICD-10-CM

## 2019-03-27 DIAGNOSIS — N95.1 SYMPTOMATIC MENOPAUSAL OR FEMALE CLIMACTERIC STATES: ICD-10-CM

## 2019-03-27 DIAGNOSIS — M85.80 OSTEOPENIA, UNSPECIFIED LOCATION: ICD-10-CM

## 2019-03-27 LAB
25(OH)D3+25(OH)D2 SERPL-MCNC: 6 NG/ML (ref 30–96)
ESTIMATED AVG GLUCOSE: 108 MG/DL (ref 68–131)
HBA1C MFR BLD HPLC: 5.4 % (ref 4–5.6)

## 2019-03-27 PROCEDURE — 82306 VITAMIN D 25 HYDROXY: CPT

## 2019-03-27 PROCEDURE — 99999 PR PBB SHADOW E&M-EST. PATIENT-LVL III: CPT | Mod: PBBFAC,,, | Performed by: OBSTETRICS & GYNECOLOGY

## 2019-03-27 PROCEDURE — 36415 COLL VENOUS BLD VENIPUNCTURE: CPT

## 2019-03-27 PROCEDURE — 83036 HEMOGLOBIN GLYCOSYLATED A1C: CPT

## 2019-03-27 PROCEDURE — G0101 PR CA SCREEN;PELVIC/BREAST EXAM: ICD-10-PCS | Mod: S$PBB,,, | Performed by: OBSTETRICS & GYNECOLOGY

## 2019-03-27 PROCEDURE — 99999 PR PBB SHADOW E&M-EST. PATIENT-LVL III: ICD-10-PCS | Mod: PBBFAC,,, | Performed by: OBSTETRICS & GYNECOLOGY

## 2019-03-27 PROCEDURE — G0101 CA SCREEN;PELVIC/BREAST EXAM: HCPCS | Mod: S$PBB,,, | Performed by: OBSTETRICS & GYNECOLOGY

## 2019-03-27 PROCEDURE — 99213 OFFICE O/P EST LOW 20 MIN: CPT | Mod: PBBFAC | Performed by: OBSTETRICS & GYNECOLOGY

## 2019-03-27 RX ORDER — GABAPENTIN 300 MG/1
CAPSULE ORAL
COMMUNITY
Start: 2017-06-04 | End: 2019-04-17

## 2019-03-27 RX ORDER — FLUTICASONE PROPIONATE 50 MCG
SPRAY, SUSPENSION (ML) NASAL
COMMUNITY
Start: 2019-02-04 | End: 2019-04-17

## 2019-03-27 RX ORDER — ESTRADIOL 1 MG/1
1 TABLET ORAL DAILY
Qty: 30 TABLET | Refills: 6 | Status: SHIPPED | OUTPATIENT
Start: 2019-03-27 | End: 2019-12-16 | Stop reason: SDUPTHER

## 2019-03-27 RX ORDER — MIRTAZAPINE 15 MG/1
TABLET, FILM COATED ORAL
COMMUNITY
Start: 2019-01-12 | End: 2019-09-23 | Stop reason: SDUPTHER

## 2019-03-27 RX ORDER — MIRTAZAPINE 15 MG/1
TABLET, FILM COATED ORAL
Qty: 90 TABLET | Refills: 0 | Status: SHIPPED | OUTPATIENT
Start: 2019-03-27 | End: 2019-09-13 | Stop reason: SDUPTHER

## 2019-03-27 RX ORDER — ESTRADIOL 1 MG/1
TABLET ORAL
COMMUNITY
Start: 2019-02-05 | End: 2019-03-27 | Stop reason: SDUPTHER

## 2019-03-27 RX ORDER — ALBUTEROL SULFATE 90 UG/1
AEROSOL, METERED RESPIRATORY (INHALATION)
COMMUNITY
Start: 2018-09-12

## 2019-03-27 RX ORDER — CALC/MAG/B COMPLEX/D3/HERB 61
15 TABLET ORAL
COMMUNITY
End: 2019-04-17

## 2019-03-27 RX ORDER — AZELASTINE 1 MG/ML
SPRAY, METERED NASAL
COMMUNITY
Start: 2018-08-28 | End: 2019-04-17

## 2019-03-27 RX ORDER — BUDESONIDE AND FORMOTEROL FUMARATE DIHYDRATE 160; 4.5 UG/1; UG/1
AEROSOL RESPIRATORY (INHALATION)
COMMUNITY
Start: 2019-02-04

## 2019-03-27 NOTE — PATIENT INSTRUCTIONS

## 2019-03-27 NOTE — PROGRESS NOTES
Subjective:      Chief Complaint:    Chief Complaint   Patient presents with    Gynecologic Exam       Menstrual History:    OB History        4    Para   4    Term   4            AB        Living   2       SAB        TAB        Ectopic        Multiple        Live Births                     Menarche age: 13     No LMP recorded. Patient has had a hysterectomy.                Objective:      PRESENT ILLNESS AND PHYSICAL EXAMINATION    HISTORY OF PRESENT ILLNESS:  The patient is 59 years of age,  4, para 4,   here for annual yearly exam.  Pap smear in 2014, was negative.  Mammogram in   2017, was negative.  Bone density is osteopenia.  The patient is taking Estrace   1 mg.    PAST MEDICAL HISTORY:  Extensive, GERD, osteopenia, pulmonary nodules, chronic   COPD, oxygen requirement, lumbar disk, alpha antitrypsin deficiency and colon   polyp.    PAST SURGICAL HISTORY:  Shoulder fracture surgery, back surgery, hysterectomy   and total BSO.    The patient has no problem related to the genital area, occasionally sexually   active.    PHYSICAL EXAMINATION:  VITAL SIGNS:  Blood pressure is 122/60 and weight is 199.  BREASTS:  No lumps, mass, discharge, skin changes, retraction or nipple changes.    Axillae are negative.  PELVIC:  External is normal.  Vulva is normal.  Bartholin's, urethral and   De Soto's are negative.  Vagina has reasonably good rugal pattern.  Cervix, uterus   and adnexa are absent.  Good apical support.  No pain.  No discomfort.  RECTAL:  External is negative.    IMPRESSION:  Essentially normal GYN exam.    PLAN:  We will do a bone density and mammogram.  Also, we will do a vitamin D   level and a hemoglobin A1c level since the patient's last comprehensive   metabolic panel indicated elevated blood sugar.  Also in the past, the patient   had vitamin D deficiency.  We will then decide if followup depending on the   mammogram report and bone density report.  We will continue with the  Estrace 1   mg and instructed the patient take it every other day.      JIV/IN  dd: 03/27/2019 08:55:02 (CDT)  td: 03/27/2019 23:27:12 (CDT)  Doc ID   #[id number]  Job ID #[id number]    CC:       History of Present Illness AND  Examination detailed DICTATE:        Physical Exam   Constitutional: She is oriented to person, place, and time. She appears well-developed and well-nourished. No distress.   HENT:   Head: Normocepha  Eyes: Pupils are equal, round, and reactive to light.   Neck: Neck supple. .   Cardiovascular: Normal rate, regular rhythm and normal heart sounds. No murmur heard.  Pulmonary/Chest:   breath decreased. WHEEZING      COPD   Abdominal: Bowel sounds are normal. She exhibits no distension and no mass. There is no tenderness. There is no rebound and no guarding   Musculoskeletal:. DECREASED   ABILITY   TO  WALK   WEAKNESS  Lymphadenopathy:        Right: No inguinal adenopathy present.        Left: No inguinal adenopathy present.   Neurological: She is alert and oriented.  Skin: Skin is warm. No rash noted.        Review of Systems  Review of Systems   Normal ROS:   Constitutional: Negative for fever, chills, activity change fatigue and unexpected weight change.   HENT: Negative for nosebleeds, congestion.  Eyes: Negative for visual disturbance.   Respiratory: Negative for shortness of breath and wheezing.    Cardiovascular: Negative for chest pain, palpitations.   Gastrointestinal: Negative for abdominal pain, diarrhea, constipation, blood in stool and abdominal distention.   Musculoskeletal: Negative for back pain.   Allergic/Immunologic: Negative   Neurological: Negative for seizures, syncope, weakness, numbness and headaches.   Hematological: Negative    Psychiatric/Behavioral: Negative     Assessment:      Diagnosis:MENOPAUSAL     OSTEOPENIA    COPD   HT       Plan:      Return in 12  months

## 2019-03-28 DIAGNOSIS — E55.9 VITAMIN D DEFICIENCY: ICD-10-CM

## 2019-03-28 DIAGNOSIS — M85.80 OSTEOPENIA, UNSPECIFIED LOCATION: Primary | ICD-10-CM

## 2019-03-28 RX ORDER — ERGOCALCIFEROL 1.25 MG/1
50000 CAPSULE ORAL
Qty: 120 CAPSULE | Refills: 2 | Status: SHIPPED | OUTPATIENT
Start: 2019-03-28 | End: 2020-03-27

## 2019-04-14 PROCEDURE — 99284 EMERGENCY DEPT VISIT MOD MDM: CPT | Mod: 25

## 2019-04-14 PROCEDURE — 25000003 PHARM REV CODE 250: Performed by: EMERGENCY MEDICINE

## 2019-04-14 PROCEDURE — 63600175 PHARM REV CODE 636 W HCPCS: Performed by: EMERGENCY MEDICINE

## 2019-04-14 PROCEDURE — 96372 THER/PROPH/DIAG INJ SC/IM: CPT

## 2019-04-14 RX ADMIN — KETOROLAC TROMETHAMINE 30 MG: 30 INJECTION, SOLUTION INTRAMUSCULAR; INTRAVENOUS at 07:04

## 2019-04-14 RX ADMIN — LORAZEPAM 1 MG: 0.5 TABLET ORAL at 07:04

## 2019-04-15 ENCOUNTER — HOSPITAL ENCOUNTER (EMERGENCY)
Facility: HOSPITAL | Age: 59
Discharge: HOME | End: 2019-04-15
Payer: MEDICARE

## 2019-04-15 VITALS — BODY MASS INDEX: 17.57 KG/M2 | HEIGHT: 63 IN | WEIGHT: 99.19 LBS

## 2019-04-15 DIAGNOSIS — M51.9 LUMBAR DISC DISEASE: ICD-10-CM

## 2019-04-15 DIAGNOSIS — M54.50 ACUTE BILATERAL LOW BACK PAIN WITHOUT SCIATICA: Primary | ICD-10-CM

## 2019-04-15 RX ORDER — KETOROLAC TROMETHAMINE 30 MG/ML
30 INJECTION, SOLUTION INTRAMUSCULAR; INTRAVENOUS ONCE
Status: COMPLETED | OUTPATIENT
Start: 2019-04-14 | End: 2019-04-14

## 2019-04-15 RX ORDER — LORAZEPAM 0.5 MG/1
1 TABLET ORAL ONCE
Status: COMPLETED | OUTPATIENT
Start: 2019-04-14 | End: 2019-04-14

## 2019-04-15 NOTE — ED PROVIDER NOTES
Encounter Date: 4/14/2019       History   No chief complaint on file.    HPI   This 59-year-old female presents to the emergency room complaining of back spasms this started at 2:00 a.m. this morning.  The patient was here on Halloween for the same thing.  She was treated with Toradol improved.  Patient has a history of lumbar surgery in the distant past.  She actually had 2 surgeries.  She has known lumbar disc disease.  She took Neurontin and Toradol this morning.  She continues to have pain she has a very slight chronic cough she is otherwise well there is no history of lower extremity neurologic deficits or pain. The patient denies bowel or bladder symptoms.  Review of patient's allergies indicates:   Allergen Reactions    Hydrocodone Nausea And Vomiting and Other (See Comments)     Other reaction(s): upsets her stomach; severe abdominal cramping    Oxycodone Nausea And Vomiting     Other reaction(s): upsets stomach; severe abdominal cramps     Past Medical History:   Diagnosis Date    Colon polyp 10/3/2013    COPD (chronic obstructive pulmonary disease) with emphysema     DDD (degenerative disc disease), lumbar     GERD (gastroesophageal reflux disease)     Pulmonary nodules     Vitamin B 12 deficiency     bets B12 shots     Past Surgical History:   Procedure Laterality Date    BACK SURGERY      times 2 lumbar    HYSTERECTOMY      complete     Family History   Problem Relation Age of Onset    Cancer Mother         ovarian    Ovarian cancer Mother     Heart disease Father         CHF    Cancer Sister         breast and vulvar    Breast cancer Sister     Heart disease Brother         Myocarditis    Diabetes Mellitus Neg Hx     Stroke Neg Hx      Social History     Tobacco Use    Smoking status: Current Every Day Smoker     Packs/day: 1.00     Years: 35.00     Pack years: 35.00     Types: Cigarettes     Start date: 5/22/1976    Smokeless tobacco: Current User   Substance Use Topics    Alcohol  use: Yes     Comment: occasional    Drug use: No     Review of Systems  The patient was questioned specifically with regard to the following.  General: Fever, chills, sweats. Neuro: Headache. Eyes: eye problems. ENT: Ear pain, sore throat. Cardiovascular: Chest pain. Respiratory: Cough, shortness of breath. Gastrointestinal: Abdominal pain, vomiting, diarrhea. Genitourinary: Painful urination.  Musculoskeletal: Arm and leg problems. Skin: Rash.  The review of systems was negative except for the following:  Cough back spasms back pain  Physical Exam     Initial Vitals   BP Pulse Resp Temp SpO2   -- -- -- -- --      MAP       --         Physical Exam  The patient was examined specifically for the following:   General:No significant distress, Good color, Warm and dry. Head and neck:Scalp atraumatic, Neck supple. Neurological:Appropriate conversation, Gross motor deficits. Eyes:Conjugate gaze, Clear corneas. ENT: No epistaxis. Cardiac: Regular rate and rhythm, Grossly normal heart tones. Pulmonary: Wheezing, Rales. Gastrointestinal: Abdominal tenderness, Abdominal distention. Musculoskeletal: Extremity deformity, Apparent pain with range of motion of the joints. Skin: Rash.   The findings on examination were normal except for the following:  The patient's legs are crossed in she is sitting up in the stretcher.  She is rocking back and forth.  She does have a tender lumbosacral spine.  There is no pain with range of motion of the lower extremities.  The patient has no lower extremity neurologic deficits.  ED Course   Procedures  Labs Reviewed - No data to display       Imaging Results    None       Medical decision making:  Given the above this patient will be treated symptomatically for lumbar back pain, lumbar disc disease, muscle spasms.  I will discharge her to outpatient evaluation and treatment.                          Clinical Impression:       ICD-10-CM ICD-9-CM   1. Acute bilateral low back pain without  sciatica M54.5 724.2     338.19   2. Lumbar disc disease M51.9 722.93                                Fred Trivedi MD  04/15/19 151

## 2019-04-16 ENCOUNTER — PES CALL (OUTPATIENT)
Dept: ADMINISTRATIVE | Facility: CLINIC | Age: 59
End: 2019-04-16

## 2019-04-17 ENCOUNTER — HOSPITAL ENCOUNTER (EMERGENCY)
Facility: HOSPITAL | Age: 59
Discharge: HOME | End: 2019-04-17
Attending: EMERGENCY MEDICINE
Payer: MEDICARE

## 2019-04-17 VITALS
BODY MASS INDEX: 17.54 KG/M2 | SYSTOLIC BLOOD PRESSURE: 109 MMHG | HEIGHT: 63 IN | DIASTOLIC BLOOD PRESSURE: 78 MMHG | OXYGEN SATURATION: 94 % | RESPIRATION RATE: 18 BRPM | TEMPERATURE: 98 F | WEIGHT: 99 LBS | HEART RATE: 118 BPM

## 2019-04-17 DIAGNOSIS — M54.41 CHRONIC BILATERAL LOW BACK PAIN WITH BILATERAL SCIATICA: Primary | ICD-10-CM

## 2019-04-17 DIAGNOSIS — G89.29 CHRONIC BILATERAL LOW BACK PAIN WITH BILATERAL SCIATICA: Primary | ICD-10-CM

## 2019-04-17 DIAGNOSIS — M62.838 MUSCLE SPASM: ICD-10-CM

## 2019-04-17 DIAGNOSIS — M54.42 CHRONIC BILATERAL LOW BACK PAIN WITH BILATERAL SCIATICA: Primary | ICD-10-CM

## 2019-04-17 LAB
ALBUMIN SERPL BCP-MCNC: 3 G/DL (ref 3.5–5.2)
ALP SERPL-CCNC: 68 U/L (ref 55–135)
ALT SERPL W/O P-5'-P-CCNC: 13 U/L (ref 10–44)
ANION GAP SERPL CALC-SCNC: 10 MMOL/L (ref 8–16)
AST SERPL-CCNC: 19 U/L (ref 10–40)
BACTERIA #/AREA URNS HPF: NORMAL /HPF
BASOPHILS # BLD AUTO: 0.02 K/UL (ref 0–0.2)
BASOPHILS NFR BLD: 0.2 % (ref 0–1.9)
BILIRUB SERPL-MCNC: 0.2 MG/DL (ref 0.1–1)
BILIRUB UR QL STRIP: NEGATIVE
BUN SERPL-MCNC: 18 MG/DL (ref 6–20)
CALCIUM SERPL-MCNC: 9.1 MG/DL (ref 8.7–10.5)
CHLORIDE SERPL-SCNC: 99 MMOL/L (ref 95–110)
CLARITY UR: ABNORMAL
CO2 SERPL-SCNC: 33 MMOL/L (ref 23–29)
COLOR UR: YELLOW
CREAT SERPL-MCNC: 0.8 MG/DL (ref 0.5–1.4)
CRP SERPL-MCNC: 0.3 MG/L (ref 0–8.2)
DIFFERENTIAL METHOD: ABNORMAL
EOSINOPHIL # BLD AUTO: 0.1 K/UL (ref 0–0.5)
EOSINOPHIL NFR BLD: 0.5 % (ref 0–8)
ERYTHROCYTE [DISTWIDTH] IN BLOOD BY AUTOMATED COUNT: 14.3 % (ref 11.5–14.5)
ERYTHROCYTE [SEDIMENTATION RATE] IN BLOOD BY WESTERGREN METHOD: 5 MM/HR (ref 0–20)
EST. GFR  (AFRICAN AMERICAN): >60 ML/MIN/1.73 M^2
EST. GFR  (NON AFRICAN AMERICAN): >60 ML/MIN/1.73 M^2
GLUCOSE SERPL-MCNC: 180 MG/DL (ref 70–110)
GLUCOSE UR QL STRIP: NEGATIVE
HCT VFR BLD AUTO: 43.3 % (ref 37–48.5)
HGB BLD-MCNC: 13.2 G/DL (ref 12–16)
HGB UR QL STRIP: ABNORMAL
KETONES UR QL STRIP: NEGATIVE
LEUKOCYTE ESTERASE UR QL STRIP: ABNORMAL
LYMPHOCYTES # BLD AUTO: 2.7 K/UL (ref 1–4.8)
LYMPHOCYTES NFR BLD: 24 % (ref 18–48)
MCH RBC QN AUTO: 29.5 PG (ref 27–31)
MCHC RBC AUTO-ENTMCNC: 30.5 G/DL (ref 32–36)
MCV RBC AUTO: 97 FL (ref 82–98)
MICROSCOPIC COMMENT: NORMAL
MONOCYTES # BLD AUTO: 0.5 K/UL (ref 0.3–1)
MONOCYTES NFR BLD: 4 % (ref 4–15)
NEUTROPHILS # BLD AUTO: 7.9 K/UL (ref 1.8–7.7)
NEUTROPHILS NFR BLD: 71.3 % (ref 38–73)
NITRITE UR QL STRIP: NEGATIVE
PH UR STRIP: 5 [PH] (ref 5–8)
PLATELET # BLD AUTO: 317 K/UL (ref 150–350)
PMV BLD AUTO: 11.8 FL (ref 9.2–12.9)
POTASSIUM SERPL-SCNC: 4.3 MMOL/L (ref 3.5–5.1)
PROT SERPL-MCNC: 6.1 G/DL (ref 6–8.4)
PROT UR QL STRIP: NEGATIVE
RBC # BLD AUTO: 4.48 M/UL (ref 4–5.4)
RBC #/AREA URNS HPF: 1 /HPF (ref 0–4)
SODIUM SERPL-SCNC: 142 MMOL/L (ref 136–145)
SP GR UR STRIP: 1.02 (ref 1–1.03)
SQUAMOUS #/AREA URNS HPF: 8 /HPF
URN SPEC COLLECT METH UR: ABNORMAL
UROBILINOGEN UR STRIP-ACNC: NEGATIVE EU/DL
WBC # BLD AUTO: 11.13 K/UL (ref 3.9–12.7)
WBC #/AREA URNS HPF: 5 /HPF (ref 0–5)

## 2019-04-17 PROCEDURE — 85652 RBC SED RATE AUTOMATED: CPT

## 2019-04-17 PROCEDURE — 81000 URINALYSIS NONAUTO W/SCOPE: CPT

## 2019-04-17 PROCEDURE — 96375 TX/PRO/DX INJ NEW DRUG ADDON: CPT

## 2019-04-17 PROCEDURE — 25000003 PHARM REV CODE 250: Performed by: EMERGENCY MEDICINE

## 2019-04-17 PROCEDURE — 80053 COMPREHEN METABOLIC PANEL: CPT

## 2019-04-17 PROCEDURE — 63600175 PHARM REV CODE 636 W HCPCS: Performed by: EMERGENCY MEDICINE

## 2019-04-17 PROCEDURE — 85025 COMPLETE CBC W/AUTO DIFF WBC: CPT

## 2019-04-17 PROCEDURE — 96374 THER/PROPH/DIAG INJ IV PUSH: CPT

## 2019-04-17 PROCEDURE — 96376 TX/PRO/DX INJ SAME DRUG ADON: CPT

## 2019-04-17 PROCEDURE — 86140 C-REACTIVE PROTEIN: CPT

## 2019-04-17 PROCEDURE — 99284 EMERGENCY DEPT VISIT MOD MDM: CPT | Mod: 25

## 2019-04-17 RX ORDER — METHOCARBAMOL 500 MG/1
500 TABLET, FILM COATED ORAL 3 TIMES DAILY
Qty: 15 TABLET | Refills: 0 | Status: SHIPPED | OUTPATIENT
Start: 2019-04-17 | End: 2019-04-22

## 2019-04-17 RX ORDER — MORPHINE SULFATE 10 MG/ML
2 INJECTION INTRAMUSCULAR; INTRAVENOUS; SUBCUTANEOUS
Status: DISCONTINUED | OUTPATIENT
Start: 2019-04-17 | End: 2019-04-17

## 2019-04-17 RX ORDER — FENTANYL CITRATE 50 UG/ML
50 INJECTION, SOLUTION INTRAMUSCULAR; INTRAVENOUS
Status: COMPLETED | OUTPATIENT
Start: 2019-04-17 | End: 2019-04-17

## 2019-04-17 RX ORDER — MORPHINE SULFATE 10 MG/ML
4 INJECTION INTRAMUSCULAR; INTRAVENOUS; SUBCUTANEOUS
Status: COMPLETED | OUTPATIENT
Start: 2019-04-17 | End: 2019-04-17

## 2019-04-17 RX ORDER — ONDANSETRON 2 MG/ML
4 INJECTION INTRAMUSCULAR; INTRAVENOUS
Status: COMPLETED | OUTPATIENT
Start: 2019-04-17 | End: 2019-04-17

## 2019-04-17 RX ORDER — KETOROLAC TROMETHAMINE 30 MG/ML
15 INJECTION, SOLUTION INTRAMUSCULAR; INTRAVENOUS
Status: COMPLETED | OUTPATIENT
Start: 2019-04-17 | End: 2019-04-17

## 2019-04-17 RX ORDER — LORAZEPAM 2 MG/ML
1 INJECTION INTRAMUSCULAR
Status: COMPLETED | OUTPATIENT
Start: 2019-04-17 | End: 2019-04-17

## 2019-04-17 RX ORDER — LIDOCAINE 50 MG/G
1 PATCH TOPICAL ONCE
Status: DISCONTINUED | OUTPATIENT
Start: 2019-04-17 | End: 2019-04-17 | Stop reason: HOSPADM

## 2019-04-17 RX ORDER — LORAZEPAM 0.5 MG/1
0.5 TABLET ORAL
Status: COMPLETED | OUTPATIENT
Start: 2019-04-17 | End: 2019-04-17

## 2019-04-17 RX ORDER — MONTELUKAST SODIUM 10 MG/1
TABLET ORAL
COMMUNITY
Start: 2019-03-11 | End: 2019-04-17

## 2019-04-17 RX ORDER — MELOXICAM 7.5 MG/1
7.5 TABLET ORAL DAILY
Qty: 20 TABLET | Refills: 0 | Status: SHIPPED | OUTPATIENT
Start: 2019-04-17

## 2019-04-17 RX ADMIN — KETOROLAC TROMETHAMINE 15 MG: 30 INJECTION, SOLUTION INTRAMUSCULAR at 04:04

## 2019-04-17 RX ADMIN — FENTANYL CITRATE 50 MCG: 50 INJECTION INTRAMUSCULAR; INTRAVENOUS at 02:04

## 2019-04-17 RX ADMIN — LORAZEPAM 1 MG: 2 INJECTION INTRAMUSCULAR; INTRAVENOUS at 04:04

## 2019-04-17 RX ADMIN — MORPHINE SULFATE 4 MG: 10 INJECTION INTRAVENOUS at 04:04

## 2019-04-17 RX ADMIN — KETOROLAC TROMETHAMINE 15 MG: 30 INJECTION, SOLUTION INTRAMUSCULAR at 02:04

## 2019-04-17 RX ADMIN — ONDANSETRON 4 MG: 2 INJECTION INTRAMUSCULAR; INTRAVENOUS at 04:04

## 2019-04-17 RX ADMIN — LIDOCAINE 1 PATCH: 50 PATCH TOPICAL at 02:04

## 2019-04-17 RX ADMIN — LORAZEPAM 0.5 MG: 0.5 TABLET ORAL at 02:04

## 2019-04-17 NOTE — ED PROVIDER NOTES
Encounter Date: 4/17/2019    SCRIBE #1 NOTE: I, Yaya Boss , am scribing for, and in the presence of,  Fabio Bauman MD . I have scribed the following portions of the note - Other sections scribed: HPI, ROS, PE .       History     Chief Complaint   Patient presents with    Back Pain     Pt c/o lower back pain x 1 hour. Pt has hx of DDD. Pt reports being here sunday for same comlpaint.    Hematuria     pt also reports seeing blood in her urine when she wipes.     CC: Back Pain/Hematuria     HPI: 60 y/o F who  has a past medical history of Colon polyp (10/3/2013), COPD (chronic obstructive pulmonary disease) with emphysema, DDD (degenerative disc disease), lumbar, GERD (gastroesophageal reflux disease), Pulmonary nodules, and Vitamin B 12 deficiency presents to the ED with gradual onset of radiating lower back pain and hematuria which began one hour ago. Pt's back pain radiates from her back to her buttocks and down her bilateral posterior legs. Pt was at the ED for identical symptoms three days ago and returned b/c her back pain re-occured. Medications include Flexeril and Neurontin with temporary relief. Pt first noticed the hematuria today when she was wiping after using the restroom. She is unsure where the blood is coming from. Pt does smoke cigarettes but denies alcohol or drug use. She specifically denies numbness/weakness, black/bloody stools, nausea/emesis. No fever/chills, chest/abdominal pain, cough/sob, headache/dizziness, rashes, weakness/ numbness.     The history is provided by the patient. No  was used.     Review of patient's allergies indicates:   Allergen Reactions    Hydrocodone Nausea And Vomiting and Other (See Comments)     Other reaction(s): upsets her stomach; severe abdominal cramping    Oxycodone Nausea And Vomiting     Other reaction(s): upsets stomach; severe abdominal cramps     Past Medical History:   Diagnosis Date    Colon polyp 10/3/2013    COPD (chronic  obstructive pulmonary disease) with emphysema     DDD (degenerative disc disease), lumbar     GERD (gastroesophageal reflux disease)     Pulmonary nodules     Vitamin B 12 deficiency     bets B12 shots     Past Surgical History:   Procedure Laterality Date    BACK SURGERY      times 2 lumbar    HYSTERECTOMY      complete     Family History   Problem Relation Age of Onset    Cancer Mother         ovarian    Ovarian cancer Mother     Heart disease Father         CHF    Cancer Sister         breast and vulvar    Breast cancer Sister     Heart disease Brother         Myocarditis    Diabetes Mellitus Neg Hx     Stroke Neg Hx      Social History     Tobacco Use    Smoking status: Current Every Day Smoker     Packs/day: 1.00     Years: 35.00     Pack years: 35.00     Types: Cigarettes     Start date: 5/22/1976    Smokeless tobacco: Current User   Substance Use Topics    Alcohol use: Yes     Comment: occasional    Drug use: No     Review of Systems   Constitutional: Negative for appetite change and fever.   HENT: Negative for rhinorrhea and sore throat.    Eyes: Negative for visual disturbance.   Respiratory: Negative for cough and shortness of breath.    Cardiovascular: Negative for chest pain.   Gastrointestinal: Negative for abdominal pain.   Genitourinary: Positive for hematuria. Negative for dysuria.   Musculoskeletal: Positive for back pain. Negative for gait problem.   Skin: Negative for rash.   Neurological: Negative for syncope.   Psychiatric/Behavioral: Negative for confusion.       Physical Exam     Initial Vitals [04/17/19 0143]   BP Pulse Resp Temp SpO2   (!) 131/90 (!) 113 18 97.6 °F (36.4 °C) 95 %      MAP       --         Physical Exam    Nursing note and vitals reviewed.  Constitutional: She appears well-developed and well-nourished. She is not diaphoretic. No distress.   HENT:   Head: Normocephalic and atraumatic.   Nose: Nose normal.   Mouth/Throat: Oropharynx is clear and moist.   Eyes:  EOM are normal. Pupils are equal, round, and reactive to light. No scleral icterus.   Neck: Normal range of motion. Neck supple. No JVD present.   Cardiovascular: Normal rate, regular rhythm, normal heart sounds and intact distal pulses. Exam reveals no gallop and no friction rub.    No murmur heard.  Pulmonary/Chest: No stridor. No respiratory distress. She has wheezes. She has no rhonchi. She has no rales.   Abdominal: Soft. Normal appearance and bowel sounds are normal. She exhibits no distension. There is no tenderness. There is no rebound and no guarding.   Musculoskeletal: Normal range of motion. She exhibits no edema or tenderness.   R lumbar paraspinal tenderness, minimal sensation to bilateral lower extremity   Neurological: She is alert and oriented to person, place, and time. She has normal strength. No cranial nerve deficit or sensory deficit.   Skin: Skin is warm and dry. No rash noted.   Psychiatric: She has a normal mood and affect. Her behavior is normal.         ED Course   Procedures  Labs Reviewed   CBC W/ AUTO DIFFERENTIAL - Abnormal; Notable for the following components:       Result Value    MCHC 30.5 (*)     Gran # (ANC) 7.9 (*)     All other components within normal limits   COMPREHENSIVE METABOLIC PANEL - Abnormal; Notable for the following components:    CO2 33 (*)     Glucose 180 (*)     Albumin 3.0 (*)     All other components within normal limits   URINALYSIS, REFLEX TO URINE CULTURE - Abnormal; Notable for the following components:    Appearance, UA Cloudy (*)     Occult Blood UA 1+ (*)     Leukocytes, UA Trace (*)     All other components within normal limits    Narrative:     Preferred Collection Type->Urine, Clean Catch   SEDIMENTATION RATE   C-REACTIVE PROTEIN   URINALYSIS MICROSCOPIC    Narrative:     Preferred Collection Type->Urine, Clean Catch          Imaging Results    None          Medical Decision Making:   Initial Assessment:   59-year-old female with history of chronic  back pain presenting with recurrent low back pain and spasm.  Patient states pain extends from her back into her legs.  Denies any symptoms related cauda equina such as lower extremity numbness or weakness, urinary retention or incontinence, fecal incontinence, saddle anesthesia.  Neuro exam unremarkable, brisk reflexes though no clonus or weakness bilaterally. Patient with wheezing which is chronic, patient denies desiring any breathing treatment.  Patient on home O2 for severe COPD and emphysema. Denies any recent instrumentation.  Patient may be on immune modulators for alpha-1 a antitrypsin deficiency.  We will get labs, sed rate, CRP, and reassess.  Patient also noted to be somewhat tachycardic.  May be due to pain. Lab results do not show any leukocytosis, elevated CRP, or elevated sed rate.  Doubt epidural abscess.  No definite midline tenderness.  We will give small dose of Ativan, fentanyl, lidocaine patch, Toradol, and reassess.  Clinical Tests:   Lab Tests: Reviewed  The following lab test(s) were unremarkable: CBC, CMP and Urinalysis  ED Management:  Patient being signed out to Dr. Sanon pending urinalysis.  Suspect if it is normal she may be able to be discharged home.    Urinalysis negative for infection.  Patient having back spasms again.  Will repeat Ativan.  Will add Mobic to her drug regimen.  Recommend patient follow up with pain management.  Patient experiencing acute neurologic symptoms or signs of cauda equina.  No evidence of significant spinal infection lab work.            Scribe Attestation:   Scribe #1: I performed the above scribed service and the documentation accurately describes the services I performed. I attest to the accuracy of the note.    Attending Attestation:           Physician Attestation for Scribe:  Physician Attestation Statement for Scribe #1: I, Fabio Bauman MD , reviewed documentation, as scribed by Yaya Boss in my presence, and it is both accurate and  complete.                    Clinical Impression:       ICD-10-CM ICD-9-CM   1. Chronic bilateral low back pain with bilateral sciatica M54.42 724.2    M54.41 724.3    G89.29 338.29   2. Muscle spasm M62.838 728.85         Disposition:   Disposition: Discharged  Condition: Stable                        Lonnie Contreras MD  04/17/19 3441

## 2019-04-17 NOTE — ED TRIAGE NOTES
Patient arrived to ed with c/o of lower back pain that radiates down bilateral legs, also reports hematuria that started yesterday; was last seen here in the Ed two days ago.

## 2019-08-24 DIAGNOSIS — F33.41 RECURRENT MAJOR DEPRESSIVE DISORDER, IN PARTIAL REMISSION: ICD-10-CM

## 2019-08-24 DIAGNOSIS — E88.01 ALPHA-1-ANTITRYPSIN DEFICIENCY: ICD-10-CM

## 2019-08-26 RX ORDER — PAROXETINE HYDROCHLORIDE 20 MG/1
TABLET, FILM COATED ORAL
Qty: 90 TABLET | Refills: 0 | Status: SHIPPED | OUTPATIENT
Start: 2019-08-26 | End: 2019-11-22 | Stop reason: SDUPTHER

## 2019-09-13 ENCOUNTER — HOSPITAL ENCOUNTER (EMERGENCY)
Facility: HOSPITAL | Age: 59
Discharge: HOME | End: 2019-09-13
Attending: EMERGENCY MEDICINE
Payer: MEDICARE

## 2019-09-13 VITALS
WEIGHT: 100 LBS | RESPIRATION RATE: 23 BRPM | HEIGHT: 63 IN | DIASTOLIC BLOOD PRESSURE: 76 MMHG | OXYGEN SATURATION: 93 % | BODY MASS INDEX: 17.72 KG/M2 | TEMPERATURE: 98 F | HEART RATE: 119 BPM | SYSTOLIC BLOOD PRESSURE: 140 MMHG

## 2019-09-13 DIAGNOSIS — R00.0 TACHYCARDIA: ICD-10-CM

## 2019-09-13 DIAGNOSIS — G89.29 CHRONIC BILATERAL LOW BACK PAIN WITH SCIATICA, SCIATICA LATERALITY UNSPECIFIED: Primary | ICD-10-CM

## 2019-09-13 DIAGNOSIS — M54.40 CHRONIC BILATERAL LOW BACK PAIN WITH SCIATICA, SCIATICA LATERALITY UNSPECIFIED: Primary | ICD-10-CM

## 2019-09-13 DIAGNOSIS — J44.9 COPD (CHRONIC OBSTRUCTIVE PULMONARY DISEASE): ICD-10-CM

## 2019-09-13 PROCEDURE — 63600175 PHARM REV CODE 636 W HCPCS: Performed by: EMERGENCY MEDICINE

## 2019-09-13 PROCEDURE — 93010 ELECTROCARDIOGRAM REPORT: CPT | Mod: ,,, | Performed by: INTERNAL MEDICINE

## 2019-09-13 PROCEDURE — 94645 CONT INHLJ TX EACH ADDL HOUR: CPT

## 2019-09-13 PROCEDURE — 99284 EMERGENCY DEPT VISIT MOD MDM: CPT | Mod: 25

## 2019-09-13 PROCEDURE — 93005 ELECTROCARDIOGRAM TRACING: CPT

## 2019-09-13 PROCEDURE — 25000242 PHARM REV CODE 250 ALT 637 W/ HCPCS: Performed by: EMERGENCY MEDICINE

## 2019-09-13 PROCEDURE — 94644 CONT INHLJ TX 1ST HOUR: CPT

## 2019-09-13 PROCEDURE — 25000003 PHARM REV CODE 250: Performed by: EMERGENCY MEDICINE

## 2019-09-13 PROCEDURE — 96375 TX/PRO/DX INJ NEW DRUG ADDON: CPT

## 2019-09-13 PROCEDURE — 96374 THER/PROPH/DIAG INJ IV PUSH: CPT

## 2019-09-13 PROCEDURE — 93010 EKG 12-LEAD: ICD-10-PCS | Mod: ,,, | Performed by: INTERNAL MEDICINE

## 2019-09-13 RX ORDER — METHYLPREDNISOLONE SOD SUCC 125 MG
125 VIAL (EA) INJECTION
Status: COMPLETED | OUTPATIENT
Start: 2019-09-13 | End: 2019-09-13

## 2019-09-13 RX ORDER — KETOROLAC TROMETHAMINE 30 MG/ML
15 INJECTION, SOLUTION INTRAMUSCULAR; INTRAVENOUS
Status: COMPLETED | OUTPATIENT
Start: 2019-09-13 | End: 2019-09-13

## 2019-09-13 RX ORDER — LORAZEPAM 2 MG/ML
1 INJECTION INTRAMUSCULAR
Status: COMPLETED | OUTPATIENT
Start: 2019-09-13 | End: 2019-09-13

## 2019-09-13 RX ORDER — IPRATROPIUM BROMIDE 0.5 MG/2.5ML
500 SOLUTION RESPIRATORY (INHALATION)
Status: COMPLETED | OUTPATIENT
Start: 2019-09-13 | End: 2019-09-13

## 2019-09-13 RX ORDER — IPRATROPIUM BROMIDE 0.5 MG/2.5ML
1 SOLUTION RESPIRATORY (INHALATION)
Status: COMPLETED | OUTPATIENT
Start: 2019-09-13 | End: 2019-09-13

## 2019-09-13 RX ORDER — MONTELUKAST SODIUM 10 MG/1
TABLET ORAL
COMMUNITY
Start: 2019-08-24

## 2019-09-13 RX ORDER — PREDNISONE 10 MG/1
10 TABLET ORAL DAILY
Qty: 21 TABLET | Refills: 0 | Status: SHIPPED | OUTPATIENT
Start: 2019-09-13 | End: 2019-09-23

## 2019-09-13 RX ORDER — ALBUTEROL SULFATE 2.5 MG/.5ML
10 SOLUTION RESPIRATORY (INHALATION)
Status: COMPLETED | OUTPATIENT
Start: 2019-09-13 | End: 2019-09-13

## 2019-09-13 RX ORDER — ALBUTEROL SULFATE 2.5 MG/.5ML
15 SOLUTION RESPIRATORY (INHALATION)
Status: COMPLETED | OUTPATIENT
Start: 2019-09-13 | End: 2019-09-13

## 2019-09-13 RX ORDER — CYCLOBENZAPRINE HCL 10 MG
10 TABLET ORAL
Status: COMPLETED | OUTPATIENT
Start: 2019-09-13 | End: 2019-09-13

## 2019-09-13 RX ADMIN — KETOROLAC TROMETHAMINE 15 MG: 30 INJECTION, SOLUTION INTRAMUSCULAR at 11:09

## 2019-09-13 RX ADMIN — ALBUTEROL SULFATE 15 MG: 2.5 SOLUTION RESPIRATORY (INHALATION) at 10:09

## 2019-09-13 RX ADMIN — METHYLPREDNISOLONE SODIUM SUCCINATE 125 MG: 125 INJECTION, POWDER, FOR SOLUTION INTRAMUSCULAR; INTRAVENOUS at 12:09

## 2019-09-13 RX ADMIN — ALBUTEROL SULFATE 10 MG: 2.5 SOLUTION RESPIRATORY (INHALATION) at 12:09

## 2019-09-13 RX ADMIN — LORAZEPAM 1 MG: 2 INJECTION INTRAMUSCULAR; INTRAVENOUS at 11:09

## 2019-09-13 RX ADMIN — IPRATROPIUM BROMIDE 500 MCG: 0.5 SOLUTION RESPIRATORY (INHALATION) at 12:09

## 2019-09-13 RX ADMIN — KETOROLAC TROMETHAMINE 15 MG: 30 INJECTION, SOLUTION INTRAMUSCULAR at 10:09

## 2019-09-13 RX ADMIN — IPRATROPIUM BROMIDE 1 MG: 0.5 SOLUTION RESPIRATORY (INHALATION) at 10:09

## 2019-09-13 RX ADMIN — CYCLOBENZAPRINE HYDROCHLORIDE 10 MG: 10 TABLET, FILM COATED ORAL at 10:09

## 2019-09-13 NOTE — ED PROVIDER NOTES
Encounter Date: 9/13/2019    SCRIBE #1 NOTE: I, Ryan Cho, am scribing for, and in the presence of,  Seth Ulloa MD. I have scribed the following portions of the note - Other sections scribed: HPI, ROS, PE.       History     Chief Complaint   Patient presents with    Back Pain     pt reports lower back pain, hx of herniated disk and sciatic nerve pain. unable to lay flat or straighted legs since yesterday. on 2L home o2 at all times.      CC: back pain    HPI:  This is a 59 y.o. female with a PMHx of chronic back pain and COPD who presents to the Emergency Department with a cc of bilateral lower back pain radiating down her legs beginning 1 day ago. Pt reports associated SOB. Pt denies fever, CP, dysuria, or frequency.  She reports being on 2L oxygen at home. Pt notes sleeping sachi recliner  rather than a bed that may have triggered her symptoms. Pt had taken Flexeril and Neurontin 7 hrs PTA.            Review of patient's allergies indicates:   Allergen Reactions    Hydrocodone Nausea And Vomiting and Other (See Comments)     Other reaction(s): upsets her stomach; severe abdominal cramping    Oxycodone Nausea And Vomiting     Other reaction(s): upsets stomach; severe abdominal cramps     Past Medical History:   Diagnosis Date    Colon polyp 10/3/2013    COPD (chronic obstructive pulmonary disease) with emphysema     DDD (degenerative disc disease), lumbar     GERD (gastroesophageal reflux disease)     Pulmonary nodules     Vitamin B 12 deficiency     bets B12 shots     Past Surgical History:   Procedure Laterality Date    BACK SURGERY      times 2 lumbar    HYSTERECTOMY      complete     Family History   Problem Relation Age of Onset    Cancer Mother         ovarian    Ovarian cancer Mother     Heart disease Father         CHF    Cancer Sister         breast and vulvar    Breast cancer Sister     Heart disease Brother         Myocarditis    Diabetes Mellitus Neg Hx     Stroke Neg Hx       Social History     Tobacco Use    Smoking status: Current Every Day Smoker     Packs/day: 1.00     Years: 35.00     Pack years: 35.00     Types: Cigarettes     Start date: 5/22/1976    Smokeless tobacco: Current User   Substance Use Topics    Alcohol use: Yes     Comment: occasional    Drug use: No     Review of Systems   Constitutional: Negative for fever.   HENT: Negative for sore throat.    Eyes: Negative for visual disturbance.   Respiratory: Positive for shortness of breath ( chronic).    Cardiovascular: Negative for chest pain.   Gastrointestinal: Negative for nausea.   Genitourinary: Negative for dysuria and frequency.   Musculoskeletal: Positive for back pain.   Skin: Negative for rash.   Neurological: Negative for weakness.   Hematological: Does not bruise/bleed easily.       Physical Exam     Initial Vitals [09/13/19 0941]   BP Pulse Resp Temp SpO2   114/74 (!) 131 20 98.5 °F (36.9 °C) (!) 90 %      MAP       --         Physical Exam    Nursing note and vitals reviewed.  Constitutional: She is not diaphoretic. She appears cachectic. No distress.   HENT:   Head: Normocephalic and atraumatic.   Dry mucous membranes   Eyes: EOM are normal. Pupils are equal, round, and reactive to light. No scleral icterus.   Neck: Normal range of motion. Neck supple. No JVD present.   Cardiovascular: Regular rhythm and intact distal pulses. Tachycardia present.    Pulmonary/Chest: No stridor. No respiratory distress. She has wheezes (bilaterally ).   accessory muscle use   Abdominal: Soft. Bowel sounds are normal. She exhibits no distension. There is no tenderness.   Musculoskeletal: Normal range of motion. She exhibits no edema.   Bilateral paraspinal lumbar tenderness, No midline tenderness, No step offs  Positive straight leg test bilaterally   Neurological: She is alert and oriented to person, place, and time. She has normal strength. No cranial nerve deficit or sensory deficit.   Skin: Skin is warm and dry.    Psychiatric: She has a normal mood and affect.         ED Course   Procedures  Labs Reviewed - No data to display     ECG Results          EKG 12-lead (In process)  Result time 09/13/19 14:30:34    In process by Interface, Lab In [facility] (09/13/19 14:30:34)                 Narrative:    Test Reason : R00.0,    Vent. Rate : 133 BPM     Atrial Rate : 133 BPM     P-R Int : 116 ms          QRS Dur : 072 ms      QT Int : 288 ms       P-R-T Axes : 085 081 074 degrees     QTc Int : 428 ms    Sinus tachycardia  Right atrial enlargement  Borderline Abnormal ECG  No previous ECGs available    Referred By: [email or web address]   SELF           Confirmed By:                   In process by Interface, Lab In [facility] (09/13/19 14:28:07)                 Narrative:    Test Reason : R00.0,    Vent. Rate : 133 BPM     Atrial Rate : 133 BPM     P-R Int : 116 ms          QRS Dur : 072 ms      QT Int : 288 ms       P-R-T Axes : 085 081 074 degrees     QTc Int : 428 ms    Sinus tachycardia  Right atrial enlargement  Borderline Abnormal ECG  No previous ECGs available    Referred By: [email or web address]   SELF           Confirmed By:                             Imaging Results          X-Ray Chest AP Portable (Final result)  Result time 09/13/19 10:45:27    Final result by Manan Craft MD (09/13/19 10:45:27)                 Impression:      1. Hyperinflation of the lungs with flattening of the diaphragm, likely from COPD.  Clinical correlation suggested.  2. No acute pulmonary processes.      Electronically signed by: Manan Craft MD  Date:    09/13/2019  Time:    10:45             Narrative:    EXAMINATION:  XR CHEST AP PORTABLE    CLINICAL HISTORY:  Chronic obstructive pulmonary disease, unspecified    TECHNIQUE:  Single frontal view of the chest was performed.    COMPARISON:  Chest 01/08/2019    FINDINGS:  Hyperinflation of the lungs with flattening of the diaphragms.  There is increased interstitial markings from chronic interstitial lung disease  unchanged from previous study.  No acute lobar consolidations or pneumothorax or pulmonary vascular congestion or pleural effusions.  There are no cavitary lung masses or significant pulmonary nodules.    Heart size is within normal limits.  Mediastinum is unremarkable.  There is calcific atherosclerotic changes of the aortic arch.    Healed right 6th and 7th rib fractures remain without significant change                              X-Rays:   Independently Interpreted Readings:   Chest X-Ray: No infiltrates.  No acute abnormalities.     Medical Decision Making:   History:   Old Medical Records: I decided to obtain old medical records.  Old Records Summarized: other records.       <> Summary of Records: Prior ED visits for acute on chronic low back pain.  Initial Assessment:   Patient with history of COPD, lumbar disc disease presents for evaluation of back pain beginning yesterday.  Patient reports current back pain is consistent with her past episodes of back pain. She denies any injury or overuse.  She is afebrile and in no acute distress at time history and physical.  Patient triaged with an O2 sat of 90.  She is on 2 L nasal cannula continuously.  At time of history and physical patient is speaking in full sentences.  She has saturation of 93-95% on her usual 2 L of nasal cannula.  She has wheezing bilaterally on auscultation but is not in respiratory distress. She has bilateral paraspinal lumbar tenderness but no midline tenderness, no step-offs.  She has a positive straight leg raise bilaterally. Will give analgesia, albuterol nebulizations, and performed chest x-ray  Differential Diagnosis:   Lumbar radiculopathy, chronic back pain, COPD, pneumonia  Independently Interpreted Test(s):   I have ordered and independently interpreted X-rays - see prior notes.  Clinical Tests:   Radiological Study: Ordered and Reviewed         Scribe attestation: Seth QUAN , personally performed the services described in  this documentation. All medical record entries made by the scribe were at my direction and in my presence.  I have reviewed the chart and agree that the record reflects my personal performance and is accurate and complete.             ED Course as of Sep 13 2032   Fri Sep 13, 2019   1054 ChestX-ray without evidence of pneumonia or pneumothorax    [SG]   1111 On reassessment patient reports persistent back pain. She is standing because because of her discomfort.  She reports no improvement after Flexeril and Toradol.  She reports the regiment she was given last time work for are.  Review of chart reveals that patient was treated with Toradol, Ativan, fentanyl, Flexeril.  Will give additional dose of Toradol as well as at an and reassess.    [SG]   1230 Patient had an episode of respiratory distress.  When patient was reassessed she with standing and anxious appearing.  She is hypoxic on nasal cannula.  On auscultation she had decreased air entry bilaterally. Patient will be given additional albuterol nebulizations as well as Solu-Medrol    [SG]   1400 On reassessment patient is resting comfortably in stretcher.  She is saturating 95% on her usual dose of home oxygen.  She has wheezing bilaterally but has much improved air entry.  Patient has had some improvement in her tachycardia.  Patient's family member reports that she is persistently tachycardic because of her medications.    [SG]   1550 On reassessment patient is resting comfortably in stretcher.  She is asleep and saturating 95% on her usual home oxygen dose.  She is easily arousable.  She denies current pain and reports feeling well she reports that she is just tired.  Will monitor patient for being more alert for discharge.    [SG]   1629 Patient awoke in the emergency department reporting that she feels significantly better and requests discharge. She is not in respiratory distress.  Vital signs are reassuring.  She does not have any focal neurological  deficits.  She is stable for discharge and does not appear to require inpatient admission at this time.  Patient does not wish observation for her symptoms. Patient and family at the bedside counseled on supportive care, appropriate medication usage, concerning symptoms for which to return to ER and the importance of follow up. Understanding and agreement with treatment plan was expressed.     [SG]      ED Course User Index  [SG] Seth Ulloa MD     Clinical Impression:       ICD-10-CM ICD-9-CM   1. Chronic bilateral low back pain with sciatica, sciatica laterality unspecified M54.40 724.2    G89.29 724.3     338.29   2. COPD (chronic obstructive pulmonary disease) J44.9 496   3. Tachycardia R00.0 785.0                                Seth Ulloa MD  09/13/19 2032

## 2019-09-13 NOTE — DISCHARGE INSTRUCTIONS
Your symptoms are likely related to your sciatica.  Your COPD is in ongoing issue.  Your oxygen became low in the emergency department.  You have been given continuous albuterol nebulizations as well as Solu-Medrol.  You should complete the prescribed prednisone taper unless instructed otherwise by your primary physician or pulmonologist.  Continue your medications as needed for back pain. Return to the emergency department for fever, chest pain, breathing difficulty, numbness, weakness, bowel or bladder problems or any new, worsening or concerning symptoms.

## 2019-09-13 NOTE — ED NOTES
Pt report she is feeling much better. Denies back pain at this time. Pt state she is ready to go. MD made aware

## 2019-09-15 ENCOUNTER — HOSPITAL ENCOUNTER (EMERGENCY)
Facility: HOSPITAL | Age: 59
Discharge: HOME | End: 2019-09-15
Attending: EMERGENCY MEDICINE
Payer: MEDICARE

## 2019-09-15 VITALS
HEIGHT: 63 IN | HEART RATE: 112 BPM | BODY MASS INDEX: 17.72 KG/M2 | SYSTOLIC BLOOD PRESSURE: 117 MMHG | TEMPERATURE: 98 F | DIASTOLIC BLOOD PRESSURE: 56 MMHG | RESPIRATION RATE: 21 BRPM | WEIGHT: 100 LBS | OXYGEN SATURATION: 94 %

## 2019-09-15 DIAGNOSIS — R06.02 SOB (SHORTNESS OF BREATH): ICD-10-CM

## 2019-09-15 DIAGNOSIS — M54.9 BACK PAIN, UNSPECIFIED BACK LOCATION, UNSPECIFIED BACK PAIN LATERALITY, UNSPECIFIED CHRONICITY: Primary | ICD-10-CM

## 2019-09-15 LAB
ALBUMIN SERPL BCP-MCNC: 3.2 G/DL (ref 3.5–5.2)
ALP SERPL-CCNC: 84 U/L (ref 55–135)
ALT SERPL W/O P-5'-P-CCNC: 11 U/L (ref 10–44)
ANION GAP SERPL CALC-SCNC: 7 MMOL/L (ref 8–16)
AST SERPL-CCNC: 13 U/L (ref 10–40)
BASOPHILS # BLD AUTO: 0.02 K/UL (ref 0–0.2)
BASOPHILS NFR BLD: 0.1 % (ref 0–1.9)
BILIRUB SERPL-MCNC: 0.2 MG/DL (ref 0.1–1)
BUN SERPL-MCNC: 18 MG/DL (ref 6–20)
CALCIUM SERPL-MCNC: 9 MG/DL (ref 8.7–10.5)
CHLORIDE SERPL-SCNC: 100 MMOL/L (ref 95–110)
CO2 SERPL-SCNC: 35 MMOL/L (ref 23–29)
CREAT SERPL-MCNC: 0.7 MG/DL (ref 0.5–1.4)
DIFFERENTIAL METHOD: ABNORMAL
EOSINOPHIL # BLD AUTO: 0.2 K/UL (ref 0–0.5)
EOSINOPHIL NFR BLD: 1.3 % (ref 0–8)
ERYTHROCYTE [DISTWIDTH] IN BLOOD BY AUTOMATED COUNT: 14 % (ref 11.5–14.5)
EST. GFR  (AFRICAN AMERICAN): >60 ML/MIN/1.73 M^2
EST. GFR  (NON AFRICAN AMERICAN): >60 ML/MIN/1.73 M^2
GLUCOSE SERPL-MCNC: 117 MG/DL (ref 70–110)
HCT VFR BLD AUTO: 45.6 % (ref 37–48.5)
HGB BLD-MCNC: 13.9 G/DL (ref 12–16)
LYMPHOCYTES # BLD AUTO: 2.3 K/UL (ref 1–4.8)
LYMPHOCYTES NFR BLD: 17.3 % (ref 18–48)
MCH RBC QN AUTO: 29.2 PG (ref 27–31)
MCHC RBC AUTO-ENTMCNC: 30.5 G/DL (ref 32–36)
MCV RBC AUTO: 96 FL (ref 82–98)
MONOCYTES # BLD AUTO: 0.8 K/UL (ref 0.3–1)
MONOCYTES NFR BLD: 5.7 % (ref 4–15)
NEUTROPHILS # BLD AUTO: 10.1 K/UL (ref 1.8–7.7)
NEUTROPHILS NFR BLD: 75.6 % (ref 38–73)
PLATELET # BLD AUTO: 349 K/UL (ref 150–350)
PMV BLD AUTO: 11 FL (ref 9.2–12.9)
POTASSIUM SERPL-SCNC: 3.7 MMOL/L (ref 3.5–5.1)
PROT SERPL-MCNC: 6.3 G/DL (ref 6–8.4)
RBC # BLD AUTO: 4.76 M/UL (ref 4–5.4)
SODIUM SERPL-SCNC: 142 MMOL/L (ref 136–145)
TROPONIN I SERPL DL<=0.01 NG/ML-MCNC: <0.006 NG/ML (ref 0–0.03)
WBC # BLD AUTO: 13.4 K/UL (ref 3.9–12.7)

## 2019-09-15 PROCEDURE — 96374 THER/PROPH/DIAG INJ IV PUSH: CPT

## 2019-09-15 PROCEDURE — 85025 COMPLETE CBC W/AUTO DIFF WBC: CPT

## 2019-09-15 PROCEDURE — 25000242 PHARM REV CODE 250 ALT 637 W/ HCPCS: Performed by: EMERGENCY MEDICINE

## 2019-09-15 PROCEDURE — 94640 AIRWAY INHALATION TREATMENT: CPT

## 2019-09-15 PROCEDURE — 93005 ELECTROCARDIOGRAM TRACING: CPT

## 2019-09-15 PROCEDURE — 99284 EMERGENCY DEPT VISIT MOD MDM: CPT | Mod: 25

## 2019-09-15 PROCEDURE — 84484 ASSAY OF TROPONIN QUANT: CPT

## 2019-09-15 PROCEDURE — 25000003 PHARM REV CODE 250: Performed by: EMERGENCY MEDICINE

## 2019-09-15 PROCEDURE — 80053 COMPREHEN METABOLIC PANEL: CPT

## 2019-09-15 PROCEDURE — 93010 EKG 12-LEAD: ICD-10-PCS | Mod: ,,, | Performed by: INTERNAL MEDICINE

## 2019-09-15 PROCEDURE — 93010 ELECTROCARDIOGRAM REPORT: CPT | Mod: ,,, | Performed by: INTERNAL MEDICINE

## 2019-09-15 PROCEDURE — 63600175 PHARM REV CODE 636 W HCPCS: Performed by: EMERGENCY MEDICINE

## 2019-09-15 RX ORDER — LIDOCAINE 50 MG/G
1 PATCH TOPICAL DAILY
Qty: 15 PATCH | Refills: 0 | Status: SHIPPED | OUTPATIENT
Start: 2019-09-15

## 2019-09-15 RX ORDER — LIDOCAINE 50 MG/G
1 PATCH TOPICAL
Status: DISCONTINUED | OUTPATIENT
Start: 2019-09-15 | End: 2019-09-15 | Stop reason: HOSPADM

## 2019-09-15 RX ORDER — KETOROLAC TROMETHAMINE 10 MG/1
10 TABLET, FILM COATED ORAL EVERY 6 HOURS
Qty: 20 TABLET | Refills: 0 | Status: ON HOLD | OUTPATIENT
Start: 2019-09-15 | End: 2020-12-18 | Stop reason: HOSPADM

## 2019-09-15 RX ORDER — KETOROLAC TROMETHAMINE 30 MG/ML
15 INJECTION, SOLUTION INTRAMUSCULAR; INTRAVENOUS
Status: COMPLETED | OUTPATIENT
Start: 2019-09-15 | End: 2019-09-15

## 2019-09-15 RX ORDER — IPRATROPIUM BROMIDE AND ALBUTEROL SULFATE 2.5; .5 MG/3ML; MG/3ML
3 SOLUTION RESPIRATORY (INHALATION)
Status: COMPLETED | OUTPATIENT
Start: 2019-09-15 | End: 2019-09-15

## 2019-09-15 RX ADMIN — KETOROLAC TROMETHAMINE 15 MG: 30 INJECTION, SOLUTION INTRAMUSCULAR; INTRAVENOUS at 04:09

## 2019-09-15 RX ADMIN — LIDOCAINE 1 PATCH: 50 PATCH TOPICAL at 02:09

## 2019-09-15 RX ADMIN — IPRATROPIUM BROMIDE AND ALBUTEROL SULFATE 3 ML: .5; 3 SOLUTION RESPIRATORY (INHALATION) at 02:09

## 2019-09-15 NOTE — ED NOTES
Physical Medicine & Rehabilitation  Progress Note    Chief Complaint:  LEFT cerebellar CVA    Subjective: On Saturday the patient was observed be consuming a bag of chips and taking a can of [employer]; he was educated by staff that he was not allowed to eat and became very agitated and abusive with the staff. On Sunday he required placement of a Venturi mask and the nonrebreather mask to maintain his oxygen saturations and a chest CT did show obstruction of the right mainstem bronchus and a right atelectatic lung. He also had an elevated white count and antibiotic were started and pulmonology was consulted with plan for a bronchoscopy tomorrow. He states that today he does not feel well and has missed all of his offered therapies. No other concerns at this time.     Rehabilitation:  Physical Therapy:  Bed Mobility:  Supine to sit : Eleazar x 1      Transfers:  Sit to Stand: Minimal Assistance, X 1  Stand to Sit:Minimal Assistance, X 1  Stand pivot : Eleazar x 1 with RW      Balance:  Dynamic Standing Balance: Minimal Assistance, X 1- 3 stands to RW from EOM  1) pt edelmira UE support on walker with PTA fastening hospital pants~ 1 minute  2) ed on posture and ADAMS ~ 1 minute   3) lateral weight shifting ~ 1 minute      Dynamic seated EOB : varied Eleazar to CGA X1; while performing seated therex and functional tasks   Wheelchair Mobility:  Stand By Assistance, X 1  Extremities Used: Right Upper Extremity and Left Upper Extremity  Type of Chair:Manual  Surface: Level Tile  Distance: ~ 165 feet x 2   Quality: Slow Velocity, Short Strokes and Decreased Fluidity    FIMS: Bed, Chair, Wheel Chair: 1 - Requires >75% assitance to transfer  Walk: 0 - Activity Not Assessed/Does Not Occur  Wheel Chair: 2 - Maximal Assistance Requires up to Maximal Assistance AND requires assistance of one person to operate wheelchair between  feet  Stairs: 0 - Activity Does not Occur ( 0 only for the admission assessment), PT Equipment Physician at bedside.     Recommendations  Other: will continue to assess, Assessment: Pt demonstrates a decrease in baseline by way of bed mobility, transfers and ambulation secondary to decreased balance, decreased endurance, trunk control, and coordination. Pt will benefit from skilled PT services during this admission and post d/c for improved functional I. Occupational therapy: FIMS:  Eatin - Staff performs feeding for patient or patient requires IV fluids for hydration/TPN/PPN  Groomin - Requires setup/cues to do all tasks(Pt washed face with setup and SBA)  Bathin - Able to bathe 8-9 areas(CGA sitting balance on shower chair, A to wash buttocks, )  Dressing-Upper: 4 - Requires assist with buttons/zippers only and/or requires assist with one arm only(CGA when doffing shirt. SBA to don shirt)  Dressing-Lower: 3 - Requires assist with 2-3 parts of dressing(donning shoes while seated EOB)  Toiletin - Able to perform 1 task only (e.g. hygiene)(A to place urinal, A with clothing mgt )  Toilet Transfer: 3 - Requires 25-49% assist getting off toilet  Shower Transfer: 3 - Moderate assistance, pt. expends 50%-74% effort,  , Assessment: Pt is making progress towards his goals, though no goals met yet due to short length of stay. Pt is limited by fatigue, nausea overall and both impacted his participation with OT today with OT missing 48 minutes. Pt demonstrates decreased insight into his deficits and tends to perseverate on eating food, though he is NPO. PT continues to demonstrate L hemibody weakness decreased midline orientation, decreased safety awareness impacting patient's ability to complete self care at prior level of functioning. Due to patient's performance deficits, patient would greatly benefit from continued occupational therapy for ADL remediation, endurance building, strengthening and neuro re-education to return to prior level of functioning and safe return to home environment.     Speech therapy: FIMS: 6:15 AM   Result Value Ref Range    INR 1.10 0.85 - 1.13   POCT glucose    Collection Time: 07/29/19  8:03 AM   Result Value Ref Range    POC Glucose 81 70 - 108 mg/dl     Xr Chest Standard (2 Vw)    Result Date: 7/29/2019  PROCEDURE: XR CHEST (2 VW) CLINICAL INFORMATION: Bilateral rhonchi, aspiration PNA history, decreased O2 sat's. COMPARISON: Chest x-ray dated 7/17/2019 TECHNIQUE: PA and lateral views of the chest. FINDINGS: Lungs/pleura: There is a new moderate right pleural effusion. Underlying atelectasis is likely, cannot exclude pneumonia. There is mediastinal shift to the right indicative of right lung volume loss. The left lung is clear. There is stable minimal blunting of the left lateral costophrenic angle likely representing pleural thickening. There is no pneumothorax. Heart: Heart size is normal. Mediastinum/brittny: No obvious mass or adenopathy. There are left hilar calcified lymph nodes. Skeleton: There are multilevel bridging thoracic vertebral endplate osteophytes consistent with DISH. Lines/tubes/devices: There is no change in the right jugular Mediport, tip near the superior cavoatrial junction. Loop recorder again lies in the lower anterior left chest wall soft tissues. New moderate right pleural effusion. Underlying atelectasis is likely, cannot exclude pneumonia. Mediastinal shift to the right consistent with right lung volume loss. **This report has been created using voice recognition software. It may contain minor errors which are inherent in voice recognition technology. ** Final report electronically signed by Dr. Liset Carney on 7/29/2019 12:56 AM    Ct Head Wo Contrast    Result Date: 7/29/2019  PROCEDURE: CT HEAD WO CONTRAST CLINICAL INFORMATION: FOCAL NEURO DEFICIT, NEW, FIXED OR WORSENING, LESS THAN 3 HOURS. COMPARISON: None TECHNIQUE: Noncontrast 5 mm axial images were obtained through the brain. Sagittal and coronal reconstructions were obtained and reviewed.  All CT scans at the RIGHT posterior cerebral artery territory secondary to high-grade stenosis at the P1 segment of the RIGHT posterior cerebral artery without definite distal flow involving the RIGHT occipital lobe and tracking anteriorly along the medial aspect of the RIGHT temporal lobe with associated local mass effect with sulcal effacement without midline shift or associated hemorrhage.   3. Gait instability. 4. Fall. 5. Concussion without LOC. 6. Physical debility with generalized weakness. 7. Healthcare associated pneumonia versus Aspiration pneumonia. 8. Acute hypoxic respiratory failure.   9. Fracture of the lateral ninth rib. 10. Peripheral vision loss LEFT > RIGHT. 11. Mild LEFT hemiparesis and ataxia. 12. Severe malnutrition. 13. Mild-moderate oral dysphagia and severe pharyngeal dysphagia. 14. S/p PEG placement on 7/11/2019 by Dr. Yoel Vasques. 15. Mild cognitive deficit.  (MOCA) version 8.2 completed.  Patient scored 20/30 over prior (MOCA) version 7.2 completed.  Patient scored 15/25.  *adjusted score given pt unable to complete written subtests (L hand dominant). 16. NSTEMI on 4/9/2019. 17. Chronic RIGHT foot drop secondary to extreme weight loss of 200-300 pounds; suspected peroneal nerve injury. 18. Caval filter placed 2009 for extensive DVT of the LEFT leg as well as calf DVT on the RIGHT. 19. Pulmonary embolism in 2018. 20. Chronic anticoagulation status. 21. 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 Derek Venegas. 22. Hypertension. 23. COPD. 24. Type 2 DM, controlled.  Last HgbA1c 5.1 on 7/12/2019.  25. Paravertebral ossifications at numerous levels along the thoracic spine with partial mineralization of the anterior longitudinal ligament consuistent with DISH.  26. Osteopenia.   27. Nicotine dependence; current every day

## 2019-09-15 NOTE — ED PROVIDER NOTES
Encounter Date: 9/15/2019    SCRIBE #1 NOTE: I, Jose Dorado, am scribing for, and in the presence of,  Donnell Clement MD. I have scribed the following portions of the note - Other sections scribed: HPI/ROS/PE.       History     Chief Complaint   Patient presents with    Back Pain     pt c/o lower back that radiates down both legs X 2 days.     CC: Back Pain    HPI: This 59 y.o. Female presents to the ED accompanied by relative for an emergent evaluation of lower back pain that radiates around bilateral hips and then posteriorly to the bilateral knees. Pain is exacerbated with movement. Pt was evaluated at this ED on 9/13/19 for similar back pain. She notes that she was given Toradol and Atrovent, as she was SOB. She was also sent home with Prednisone. Relative notes that pt has been evaluated 5 times within the last year for similar back pain. She had a surgery to the back in the past. Pt attempted tx with Flexeril and Neurontin PTA and has been using a heating pad often. Relative notes that pt's SOB exacerbated today during back pain episode. She has a hx of COPD and is on 2L of O2 at home. No alleviating factors. Denies fever, chills, n/v/d, abdominal pain, numbness, weakness, and any other associated symptoms.    The history is provided by the patient and a relative. No  was used.     Review of patient's allergies indicates:   Allergen Reactions    Hydrocodone Nausea And Vomiting and Other (See Comments)     Other reaction(s): upsets her stomach; severe abdominal cramping    Oxycodone Nausea And Vomiting     Other reaction(s): upsets stomach; severe abdominal cramps     Past Medical History:   Diagnosis Date    Colon polyp 10/3/2013    COPD (chronic obstructive pulmonary disease) with emphysema     DDD (degenerative disc disease), lumbar     GERD (gastroesophageal reflux disease)     Pulmonary nodules     Vitamin B 12 deficiency     bets B12 shots     Past Surgical  History:   Procedure Laterality Date    BACK SURGERY      times 2 lumbar    HYSTERECTOMY      complete     Family History   Problem Relation Age of Onset    Cancer Mother         ovarian    Ovarian cancer Mother     Heart disease Father         CHF    Cancer Sister         breast and vulvar    Breast cancer Sister     Heart disease Brother         Myocarditis    Diabetes Mellitus Neg Hx     Stroke Neg Hx      Social History     Tobacco Use    Smoking status: Current Every Day Smoker     Packs/day: 1.00     Years: 35.00     Pack years: 35.00     Types: Cigarettes     Start date: 5/22/1976    Smokeless tobacco: Current User   Substance Use Topics    Alcohol use: Yes     Comment: occasional    Drug use: No     Review of Systems   Constitutional: Negative.    HENT: Negative.    Eyes: Negative.    Respiratory: Positive for shortness of breath.    Cardiovascular: Negative.    Gastrointestinal: Negative.    Genitourinary: Negative.    Musculoskeletal: Positive for arthralgias (bilateral hips) and back pain.   Skin: Negative.    Neurological: Negative.        Physical Exam     Initial Vitals [09/15/19 1340]   BP Pulse Resp Temp SpO2   124/64 (!) 129 18 97.8 °F (36.6 °C) (!) 89 %      MAP       --         Physical Exam    Nursing note and vitals reviewed.  Constitutional: She appears well-developed and well-nourished. She is not diaphoretic. No distress.   HENT:   Head: Normocephalic and atraumatic.   Mouth/Throat: Oropharynx is clear and moist.   Eyes: EOM are normal. Pupils are equal, round, and reactive to light.   Neck: Normal range of motion. Neck supple.   Cardiovascular: Regular rhythm, normal heart sounds and intact distal pulses. Tachycardia present.    No murmur heard.  Pulmonary/Chest: No respiratory distress.   Coarse breath sounds throughout.    Abdominal: Soft. Bowel sounds are normal. There is no tenderness.   Musculoskeletal: Normal range of motion. She exhibits no edema.   Mild tenderness to  palpation to bilateral lumbar back. Normal bilateral LE strength.    Neurological: She is alert and oriented to person, place, and time. No sensory deficit.   Skin: Skin is warm and dry.   Well-healed surgical scar to the midline lumbar region.    Psychiatric: She has a normal mood and affect.         ED Course   Procedures  Labs Reviewed   CBC W/ AUTO DIFFERENTIAL - Abnormal; Notable for the following components:       Result Value    WBC 13.40 (*)     Mean Corpuscular Hemoglobin Conc 30.5 (*)     Gran # (ANC) 10.1 (*)     Gran% 75.6 (*)     Lymph% 17.3 (*)     All other components within normal limits   COMPREHENSIVE METABOLIC PANEL - Abnormal; Notable for the following components:    CO2 35 (*)     Glucose 117 (*)     Albumin 3.2 (*)     Anion Gap 7 (*)     All other components within normal limits   TROPONIN I        ECG Results          EKG 12-lead (Final result)  Result time 09/16/19 20:36:51    Final result by Interface, Lab In [facility] (09/16/19 20:36:51)                 Narrative:    Test Reason : R06.02,    Vent. Rate : 120 BPM     Atrial Rate : 120 BPM     P-R Int : 116 ms          QRS Dur : 070 ms      QT Int : 300 ms       P-R-T Axes : 089 092 084 degrees     QTc Int : 424 ms    Sinus tachycardia  Biatrial enlargement  Rightward axis  Pulmonary disease pattern  Abnormal ECG  When compared with ECG of 13-SEP-2019 12:29,  No significant change was found  Confirmed by Justin Rossi MD ([id number]) on 9/16/2019 8:36:40 PM    Referred By: [email or web address]   SELF           Confirmed By:Justin Rossi MD                            Imaging Results          X-Ray Chest 1 View (Final result)  Result time 09/15/19 14:42:17    Final result by Angel Cavazos MD (09/15/19 14:42:17)                 Impression:      No failure or pneumonia.      Electronically signed by: Angel Cavazos  Date:    09/15/2019  Time:    14:42             Narrative:    EXAMINATION:  XR CHEST 1 VIEW    CLINICAL HISTORY:  Shortness of  breath    TECHNIQUE:  Single frontal view of the chest was performed.    COMPARISON:  09/13/2019 chest and 06/07/2017 chest.    FINDINGS:  Single AP portable view at 14:13.    The heart and shadows and mediastinal contours trachea appear normal.  No pneumothorax or pleural effusion or interstitial edema consolidation or nodule.  There are old healed right rib fractures.  No acute rib fracture found.  There is scattered reticular opacities and perhaps mild hyperinflation.                                   MDM:    59 y.o.female with PMHx as noted above presents with back pain with flexeril taken pta. Physical exam remarkable for ill appearing female, with noted wheezing on exam, and ttp over bilateral lower para-lumbar muscle areas.  ED workup remarkable for CBC- baseline WBC, CMP wnl, CXR - unremarkable.  Pt presentation consistent with baseline COPD status, with active lumbar muscle spasm, given toradol/lidoderm with significant improvement in pain.  At this time given patient's history, physical exam, and ED workup do not suspect COPD exacerbation, MI/ACS, PE, PTX, cauda equina, disc herniation, or any further malignant cause. Pt given PO toradol, lidoderm patches for further management at home.  Discussed diagnosis and further treatment with patient, return precautions given and all questions answered.  Patient in understanding of plan.  Pt discharged to home improved and stable.          Scribe Attestation:   Scribe #1: I performed the above scribed service and the documentation accurately describes the services I performed. I attest to the accuracy of the note.           Scribe attestation: I, Donnell Clement M.D., personally performed the services described in this documentation. All medical record entries made by the scribe were at my direction and in my presence.  I have reviewed the chart and agree that the record reflects my personal performance and is accurate and complete.     Clinical Impression:        ICD-10-CM ICD-9-CM   1. Back pain, unspecified back location, unspecified back pain laterality, unspecified chronicity M54.9 724.5   2. SOB (shortness of breath) R06.02 786.05                                Donnell Clement MD  09/17/19 1425

## 2019-09-23 DIAGNOSIS — F33.41 RECURRENT MAJOR DEPRESSIVE DISORDER, IN PARTIAL REMISSION: ICD-10-CM

## 2019-09-23 RX ORDER — MIRTAZAPINE 15 MG/1
TABLET, FILM COATED ORAL
Qty: 90 TABLET | Refills: 0 | Status: SHIPPED | OUTPATIENT
Start: 2019-09-23 | End: 2019-12-24

## 2019-11-22 DIAGNOSIS — E88.01 ALPHA-1-ANTITRYPSIN DEFICIENCY: ICD-10-CM

## 2019-11-22 DIAGNOSIS — F33.41 RECURRENT MAJOR DEPRESSIVE DISORDER, IN PARTIAL REMISSION: ICD-10-CM

## 2019-11-22 RX ORDER — PAROXETINE HYDROCHLORIDE 20 MG/1
TABLET, FILM COATED ORAL
Qty: 90 TABLET | Refills: 3 | Status: SHIPPED | OUTPATIENT
Start: 2019-11-22 | End: 2020-11-16 | Stop reason: SDUPTHER

## 2019-12-16 DIAGNOSIS — N95.1 SYMPTOMATIC MENOPAUSAL OR FEMALE CLIMACTERIC STATES: ICD-10-CM

## 2019-12-17 DIAGNOSIS — N95.1 SYMPTOMATIC MENOPAUSAL OR FEMALE CLIMACTERIC STATES: ICD-10-CM

## 2019-12-17 RX ORDER — ESTRADIOL 1 MG/1
TABLET ORAL
Qty: 30 TABLET | Refills: 0 | Status: SHIPPED | OUTPATIENT
Start: 2019-12-17 | End: 2019-12-17 | Stop reason: SDUPTHER

## 2019-12-17 RX ORDER — ESTRADIOL 1 MG/1
TABLET ORAL
Qty: 90 TABLET | Refills: 0 | Status: SHIPPED | OUTPATIENT
Start: 2019-12-17 | End: 2020-06-02 | Stop reason: SDUPTHER

## 2019-12-22 DIAGNOSIS — F33.41 RECURRENT MAJOR DEPRESSIVE DISORDER, IN PARTIAL REMISSION: ICD-10-CM

## 2019-12-24 RX ORDER — MIRTAZAPINE 15 MG/1
TABLET, FILM COATED ORAL
Qty: 90 TABLET | Refills: 0 | Status: SHIPPED | OUTPATIENT
Start: 2019-12-24 | End: 2020-03-23

## 2020-03-21 DIAGNOSIS — F33.41 RECURRENT MAJOR DEPRESSIVE DISORDER, IN PARTIAL REMISSION: ICD-10-CM

## 2020-03-23 RX ORDER — MIRTAZAPINE 15 MG/1
TABLET, FILM COATED ORAL
Qty: 90 TABLET | Refills: 0 | Status: SHIPPED | OUTPATIENT
Start: 2020-03-23 | End: 2020-06-22

## 2020-06-02 DIAGNOSIS — N95.1 SYMPTOMATIC MENOPAUSAL OR FEMALE CLIMACTERIC STATES: ICD-10-CM

## 2020-06-02 RX ORDER — ESTRADIOL 1 MG/1
TABLET ORAL
Qty: 90 TABLET | Refills: 0 | Status: SHIPPED | OUTPATIENT
Start: 2020-06-02 | End: 2020-06-10 | Stop reason: SDUPTHER

## 2020-06-10 ENCOUNTER — OFFICE VISIT (OUTPATIENT)
Dept: OBSTETRICS AND GYNECOLOGY | Facility: CLINIC | Age: 60
End: 2020-06-10
Payer: MEDICARE

## 2020-06-10 VITALS — WEIGHT: 87 LBS | BODY MASS INDEX: 15.41 KG/M2

## 2020-06-10 DIAGNOSIS — N95.1 SYMPTOMATIC MENOPAUSAL OR FEMALE CLIMACTERIC STATES: Primary | ICD-10-CM

## 2020-06-10 PROCEDURE — 99213 OFFICE O/P EST LOW 20 MIN: CPT | Mod: S$PBB,,, | Performed by: OBSTETRICS & GYNECOLOGY

## 2020-06-10 PROCEDURE — 99999 PR PBB SHADOW E&M-EST. PATIENT-LVL III: CPT | Mod: PBBFAC,,, | Performed by: OBSTETRICS & GYNECOLOGY

## 2020-06-10 PROCEDURE — 99999 PR PBB SHADOW E&M-EST. PATIENT-LVL III: ICD-10-PCS | Mod: PBBFAC,,, | Performed by: OBSTETRICS & GYNECOLOGY

## 2020-06-10 PROCEDURE — 99213 PR OFFICE/OUTPT VISIT, EST, LEVL III, 20-29 MIN: ICD-10-PCS | Mod: S$PBB,,, | Performed by: OBSTETRICS & GYNECOLOGY

## 2020-06-10 PROCEDURE — 99213 OFFICE O/P EST LOW 20 MIN: CPT | Mod: PBBFAC | Performed by: OBSTETRICS & GYNECOLOGY

## 2020-06-10 RX ORDER — ESTRADIOL 1 MG/1
TABLET ORAL
Qty: 90 TABLET | Refills: 4 | Status: SHIPPED | OUTPATIENT
Start: 2020-06-10 | End: 2021-08-16

## 2020-06-10 NOTE — PROGRESS NOTES
Subjective:       Patient ID: Renée Lucas is a 60 y.o. female.    Chief Complaint:  No chief complaint on file.      History of Present Illness  HPI  Hormone Replacement Counseling  Patient presents to discuss hormone replacement therapy. Patient is requesting hormone replacement therapy due to hot flashes and insomnia. The patient is taking hormone replacement therapy. Patient denies post-menopausal vaginal bleeding. The patient is not currently sexually active. GYN screening history: last pap: was normal and last mammogram: was normal. Patient is hormone deficient due to menopause          GYN & OB History  No LMP recorded. Patient has had a hysterectomy.   Date of Last Pap: 7/15/2014    OB History    Para Term  AB Living   4 4 4     2   SAB TAB Ectopic Multiple Live Births                  # Outcome Date GA Lbr [place]/2nd Weight Sex Delivery Anes PTL Lv   4 Term            3 Term            2 Term            1 Term                Review of Systems  Review of Systems   Constitutional: Positive for activity change and unexpected weight change.   HENT: Negative.    Eyes: Negative.    Respiratory: Positive for cough, shortness of breath and wheezing.    Cardiovascular: Positive for palpitations.   Gastrointestinal: Negative.    Endocrine: Negative.  Positive for hot flashes.   Genitourinary: Negative.    Musculoskeletal: Positive for arthralgias and joint swelling.   Integumentary:  Negative.   Neurological: Negative.    Hematological: Negative.    Psychiatric/Behavioral: Negative.    Breast: negative.            Objective:    Physical Exam:   Constitutional: She is oriented to person, place, and time. She appears well-developed.    HENT:   Head: Normocephalic and atraumatic.    Eyes: EOM are normal.     Cardiovascular: Normal rate.     Pulmonary/Chest: She has wheezes (increased effort.  Patient on oxygen).        Abdominal: Soft. There is no tenderness.             Musculoskeletal: Normal range of  motion.       Neurological: She is oriented to person, place, and time.    Skin: Skin is warm.    Psychiatric: She has a normal mood and affect.          Assessment:        1. Symptomatic menopausal or female climacteric states                Plan:      1. Symptomatic menopausal or female climacteric states  A full discussion of the benefit-risk ratio of hormonal replacement therapy was peformed. Improvement in vasomotor and other climacteric symptoms is discussed, including possible improvements in sleep and mood. Reduction of risk for osteoporosis was explained. We discussed the study data showing increased risk of thrombo-embolic events such as myocardial infarction, stroke and also possibly breast cancer with estrogen replacement, and how this might affect her. The range of side effects such as breast tenderness, weight gain and including possible increases in lifetime risk of breast cancer and possible thrombotic complications was discussed. We also discussed ACOG's recommendation to use hormone replacement therapy for the relief of hot flashes alone and to be on the lowest dose possible for the shortest duration. Alternative such as herbal and soy-based products were reviewed. All of her questions about this therapy were answered.     - I also advised that she was especially at increased risk secondary to smoking.  She voiced understanding, but still wishes to continue HRT.    - estradioL (ESTRACE) 1 MG tablet; TAKE 1 TABLET(1 MG) BY MOUTH EVERY DAY  Dispense: 90 tablet; Refill: 4

## 2020-09-17 DIAGNOSIS — F33.41 RECURRENT MAJOR DEPRESSIVE DISORDER, IN PARTIAL REMISSION: ICD-10-CM

## 2020-09-17 RX ORDER — MIRTAZAPINE 15 MG/1
TABLET, FILM COATED ORAL
Qty: 90 TABLET | Refills: 1 | Status: SHIPPED | OUTPATIENT
Start: 2020-09-17 | End: 2021-02-19

## 2020-10-05 ENCOUNTER — PATIENT MESSAGE (OUTPATIENT)
Dept: ADMINISTRATIVE | Facility: HOSPITAL | Age: 60
End: 2020-10-05

## 2020-10-19 ENCOUNTER — HOSPITAL ENCOUNTER (EMERGENCY)
Facility: HOSPITAL | Age: 60
Discharge: HOME | End: 2020-10-19
Attending: EMERGENCY MEDICINE
Payer: MEDICARE

## 2020-10-19 VITALS
DIASTOLIC BLOOD PRESSURE: 54 MMHG | BODY MASS INDEX: 14.18 KG/M2 | WEIGHT: 80 LBS | HEIGHT: 63 IN | OXYGEN SATURATION: 99 % | HEART RATE: 99 BPM | RESPIRATION RATE: 18 BRPM | SYSTOLIC BLOOD PRESSURE: 129 MMHG | TEMPERATURE: 99 F

## 2020-10-19 DIAGNOSIS — M54.50 BACK PAIN, LUMBOSACRAL: ICD-10-CM

## 2020-10-19 DIAGNOSIS — R00.0 SINUS TACHYCARDIA: ICD-10-CM

## 2020-10-19 DIAGNOSIS — R06.02 SHORTNESS OF BREATH: ICD-10-CM

## 2020-10-19 DIAGNOSIS — J44.1 ACUTE EXACERBATION OF CHRONIC OBSTRUCTIVE PULMONARY DISEASE (COPD): Primary | ICD-10-CM

## 2020-10-19 PROCEDURE — 93010 ELECTROCARDIOGRAM REPORT: CPT | Mod: ,,, | Performed by: INTERNAL MEDICINE

## 2020-10-19 PROCEDURE — 99284 EMERGENCY DEPT VISIT MOD MDM: CPT | Mod: 25

## 2020-10-19 PROCEDURE — 93010 EKG 12-LEAD: ICD-10-PCS | Mod: ,,, | Performed by: INTERNAL MEDICINE

## 2020-10-19 PROCEDURE — 93005 ELECTROCARDIOGRAM TRACING: CPT

## 2020-10-19 PROCEDURE — 25000003 PHARM REV CODE 250: Performed by: EMERGENCY MEDICINE

## 2020-10-19 RX ORDER — ONDANSETRON 4 MG/1
4 TABLET, ORALLY DISINTEGRATING ORAL
Status: COMPLETED | OUTPATIENT
Start: 2020-10-19 | End: 2020-10-19

## 2020-10-19 RX ORDER — TRAMADOL HYDROCHLORIDE 50 MG/1
50 TABLET ORAL
Status: COMPLETED | OUTPATIENT
Start: 2020-10-19 | End: 2020-10-19

## 2020-10-19 RX ORDER — PREDNISONE 20 MG/1
40 TABLET ORAL DAILY
Qty: 10 TABLET | Refills: 0 | Status: SHIPPED | OUTPATIENT
Start: 2020-10-19 | End: 2020-10-24

## 2020-10-19 RX ADMIN — ONDANSETRON 4 MG: 4 TABLET, ORALLY DISINTEGRATING ORAL at 03:10

## 2020-10-19 RX ADMIN — TRAMADOL HYDROCHLORIDE 50 MG: 50 TABLET, FILM COATED ORAL at 03:10

## 2020-10-19 NOTE — ED PROVIDER NOTES
Encounter Date: 10/19/2020    SCRIBE #1 NOTE: I, Basilio Fuller, am scribing for, and in the presence of,  Fred Trivedi MD. I have scribed the following portions of the note - Other sections scribed: HPI,ROS.       History     Chief Complaint   Patient presents with    Shortness of Breath     pt comes in via EMS from home with c/o SOB x2 days. hx of COPD. received duoneb pta and 125mg solumedrol per EMS pta.      60 y.o. female with past medical history of COPD presenting with 2 day history of increasing shortness of breath at rest. Per EMS, SATs were 42% on RA and was given duoneb and 125 mg of solumedrol that patient stated helped. Patient states she in on 3 L of oxygen at home. Patient also endorses 10/10 tailbone bone pain which she attributes to significantly unexpected weight loss this past year. She denies chest pain, cough, fever, chills, diaphoresis, nausea, vomiting, abdominal pain, loss of appetite, or any further complaints.     The history is provided by the patient. No  was used.     Review of patient's allergies indicates:   Allergen Reactions    Hydrocodone Nausea And Vomiting and Other (See Comments)     Other reaction(s): upsets her stomach; severe abdominal cramping    Oxycodone Nausea And Vomiting     Other reaction(s): upsets stomach; severe abdominal cramps     Past Medical History:   Diagnosis Date    Colon polyp 10/3/2013    COPD (chronic obstructive pulmonary disease) with emphysema     DDD (degenerative disc disease), lumbar     GERD (gastroesophageal reflux disease)     Pulmonary nodules     Vitamin B 12 deficiency     bets B12 shots     Past Surgical History:   Procedure Laterality Date    BACK SURGERY      times 2 lumbar    HYSTERECTOMY      complete     Family History   Problem Relation Age of Onset    Cancer Mother         ovarian    Ovarian cancer Mother     Heart disease Father         CHF    Cancer Sister         breast and vulvar    Breast cancer  Sister     Heart disease Brother         Myocarditis    Diabetes Mellitus Neg Hx     Stroke Neg Hx      Social History     Tobacco Use    Smoking status: Current Every Day Smoker     Packs/day: 1.00     Years: 35.00     Pack years: 35.00     Types: Cigarettes     Start date: 5/22/1976    Smokeless tobacco: Current User   Substance Use Topics    Alcohol use: Yes     Comment: occasional    Drug use: No     Review of Systems   Constitutional: Positive for unexpected weight change. Negative for appetite change, chills, diaphoresis and fever.   HENT: Negative for ear pain and sore throat.    Eyes: Negative for pain.   Respiratory: Positive for shortness of breath. Negative for cough.    Cardiovascular: Negative for chest pain.   Gastrointestinal: Negative for abdominal pain, diarrhea, nausea and vomiting.   Genitourinary: Negative for dysuria.   Musculoskeletal: Positive for back pain.   Skin: Negative for rash.   Neurological: Negative for headaches.       Physical Exam     Initial Vitals [10/19/20 1443]   BP Pulse Resp Temp SpO2   120/68 (!) 123 18 98.4 °F (36.9 °C) 100 %      MAP       --         Physical Exam  The patient was examined specifically for the following:   General:No significant distress, Good color, Warm and dry. Head and neck:Scalp atraumatic, Neck supple. Neurological:Appropriate conversation, Gross motor deficits. Eyes:Conjugate gaze, Clear corneas. ENT: No epistaxis. Cardiac: Regular rate and rhythm, Grossly normal heart tones. Pulmonary: Wheezing, Rales. Gastrointestinal: Abdominal tenderness, Abdominal distention. Musculoskeletal: Extremity deformity, Apparent pain with range of motion of the joints. Skin: Rash.   The findings on examination were normal except for the following:  Patient is tachycardic with a heart rate of 123.  The lungs are remarkable for moderate bilateral wheezing.   The heart tones are normal, except for regular tachycardia..  The abdomen is nontender.  There is a  surgical scar in the low lumbar high sacral back.  Lower extremity neurologic examination is unremarkable.  There is no pedal edema.  The patient is in no severe respiratory distress at this time.  Oxygen saturations are 100% on 3 L nasal cannula.  The patient's respiratory rate is 18.  The patient is not splinting her sacral back.   ED Course   Critical Care    Date/Time: 10/19/2020 5:04 PM  Performed by: Fred Trivedi MD  Authorized by: Fred Trivedi MD   Direct patient critical care time: 22 minutes  Additional history critical care time: 5 minutes  Ordering / reviewing critical care time: 5 minutes  Documentation critical care time: 11 minutes  Total critical care time (exclusive of procedural time) : 43 minutes  Critical care time was exclusive of separately billable procedures and treating other patients and teaching time.  Critical care was necessary to treat or prevent imminent or life-threatening deterioration of the following conditions: respiratory failure.  Critical care was time spent personally by me on the following activities: development of treatment plan with patient or surrogate, evaluation of patient's response to treatment, examination of patient, obtaining history from patient or surrogate, ordering and performing treatments and interventions, ordering and review of radiographic studies, pulse oximetry, re-evaluation of patient's condition and review of old charts.        Labs Reviewed - No data to display  EKG Readings: (Independently Interpreted)   This patient is in a sinus tachycardia with a heart rate of 124.  There are no significant ST segment or T-wave changes.  There is poor R-wave progression across the precordium.  The patient may have right atrial enlargement.  The axis is normal.  There is no evidence of acute myocardial infarction or malignant arrhythmia.  A previous EKG from September 15th is remarkable for a similar tachycardia.       Imaging Results          X-Ray Chest  AP Portable (Final result)  Result time 10/19/20 16:32:45    Final result by Konstantin Gloria MD (10/19/20 16:32:45)                 Impression:      Hyperexpansion of the lungs suggestive of underlying emphysematous changes.    No acute chest disease identified.  No detrimental change noted when compared to 09/15/2019.      Electronically signed by: Konstantin Gloria MD  Date:    10/19/2020  Time:    16:32             Narrative:    EXAMINATION:  XR CHEST AP PORTABLE    CLINICAL HISTORY:  chest pain;    TECHNIQUE:  Single frontal view of the chest was performed.    COMPARISON:  09/15/2019.    FINDINGS:  The heart is not enlarged.  Atherosclerotic calcification is present within the aortic arch.  Superior mediastinal structures are unremarkable.  Pulmonary vasculature is within normal limits.  The lungs are hyperexpanded suggestive of underlying emphysematous changes.  There is nonspecific coarsening of the interstitial markings within the lungs, not significantly changed.  There is no evidence for pneumothorax or large pleural effusions.  There are healed right-sided rib fractures present as before.                              Medical decision making:  Given the above this patient presents to the emergency with shortness of breath.  This patient has severe COPD.  She has wheezing bilaterally.  She is tachycardic.  I believe this is likely from her nebulizer treatments.  Oxygen saturations are 100% on her 3 L nasal cannula home O2.  Patient has chronic lumbosacral back pain.  She is treated with tramadol.  I will start her on steroids for 5 days.  I will have her follow up with primary care.  The patient has a sinus tachycardia.  This is almost identical to her EKG from her last visit.  The patient is quite comfortable at discharge with oxygen saturations of 100%.  She is sleeping.  Her heart rate is 110.                Scribe Attestation:   Scribe #1: I performed the above scribed service and the documentation  accurately describes the services I performed. I attest to the accuracy of the note.                      Clinical Impression:     ICD-10-CM ICD-9-CM   1. Acute exacerbation of chronic obstructive pulmonary disease (COPD)  J44.1 491.21   2. Shortness of breath  R06.02 786.05   3. Back pain, lumbosacral  M54.5 724.2     724.6   4. Sinus tachycardia  R00.0 427.89                  I personally performed the services described in this documentation.  All medical record  entries made by the scribe are at my direction and in my presence.  Signed, Dr. Trivedi         ED Disposition Condition    Discharge Stable        ED Prescriptions     Medication Sig Dispense Start Date End Date Auth. Provider    predniSONE (DELTASONE) 20 MG tablet Take 2 tablets (40 mg total) by mouth once daily. for 5 days 10 tablet 10/19/2020 10/24/2020 Fred Trivedi MD        Follow-up Information    None                                      Fred Trivedi MD  10/19/20 1703       Fred Trivedi MD  10/19/20 1705       Fred Trivedi MD  11/10/20 2004

## 2020-10-19 NOTE — DISCHARGE INSTRUCTIONS
Please return immediately if you get worse or if new problems develop.  Please follow-up with your primary care doctor this week.  Rest.  Use your nebulizer machine in oxygen at home.  Prednisone as directed.

## 2020-11-16 DIAGNOSIS — F33.41 RECURRENT MAJOR DEPRESSIVE DISORDER, IN PARTIAL REMISSION: ICD-10-CM

## 2020-11-16 DIAGNOSIS — E88.01 ALPHA-1-ANTITRYPSIN DEFICIENCY: ICD-10-CM

## 2020-11-16 RX ORDER — PAROXETINE HYDROCHLORIDE 20 MG/1
20 TABLET, FILM COATED ORAL DAILY
Qty: 90 TABLET | Refills: 3 | Status: SHIPPED | OUTPATIENT
Start: 2020-11-16

## 2020-11-25 ENCOUNTER — HOSPITAL ENCOUNTER (INPATIENT)
Facility: HOSPITAL | Age: 60
LOS: 1 days | Discharge: HOME | DRG: 190 | End: 2020-11-27
Attending: EMERGENCY MEDICINE | Admitting: INTERNAL MEDICINE
Payer: MEDICARE

## 2020-11-25 DIAGNOSIS — R06.02 SHORTNESS OF BREATH: ICD-10-CM

## 2020-11-25 DIAGNOSIS — R00.0 TACHYCARDIA: ICD-10-CM

## 2020-11-25 DIAGNOSIS — J44.1 COPD EXACERBATION: Primary | ICD-10-CM

## 2020-11-25 LAB
ALBUMIN SERPL BCP-MCNC: 3.4 G/DL (ref 3.5–5.2)
ALP SERPL-CCNC: 74 U/L (ref 55–135)
ALT SERPL W/O P-5'-P-CCNC: 9 U/L (ref 10–44)
ANION GAP SERPL CALC-SCNC: 9 MMOL/L (ref 8–16)
AST SERPL-CCNC: 11 U/L (ref 10–40)
BASOPHILS # BLD AUTO: 0.05 K/UL (ref 0–0.2)
BASOPHILS NFR BLD: 0.5 % (ref 0–1.9)
BILIRUB SERPL-MCNC: 0.3 MG/DL (ref 0.1–1)
BNP SERPL-MCNC: 19 PG/ML (ref 0–99)
BUN SERPL-MCNC: 13 MG/DL (ref 6–20)
CALCIUM SERPL-MCNC: 9.5 MG/DL (ref 8.7–10.5)
CHLORIDE SERPL-SCNC: 92 MMOL/L (ref 95–110)
CO2 SERPL-SCNC: 45 MMOL/L (ref 23–29)
CREAT SERPL-MCNC: 0.6 MG/DL (ref 0.5–1.4)
CTP QC/QA: YES
DIFFERENTIAL METHOD: ABNORMAL
EOSINOPHIL # BLD AUTO: 0.3 K/UL (ref 0–0.5)
EOSINOPHIL NFR BLD: 3.1 % (ref 0–8)
ERYTHROCYTE [DISTWIDTH] IN BLOOD BY AUTOMATED COUNT: 12.7 % (ref 11.5–14.5)
EST. GFR  (AFRICAN AMERICAN): >60 ML/MIN/1.73 M^2
EST. GFR  (NON AFRICAN AMERICAN): >60 ML/MIN/1.73 M^2
GLUCOSE SERPL-MCNC: 151 MG/DL (ref 70–110)
HCT VFR BLD AUTO: 41.8 % (ref 37–48.5)
HGB BLD-MCNC: 12.8 G/DL (ref 12–16)
IMM GRANULOCYTES # BLD AUTO: 0.03 K/UL (ref 0–0.04)
IMM GRANULOCYTES NFR BLD AUTO: 0.3 % (ref 0–0.5)
LYMPHOCYTES # BLD AUTO: 1.3 K/UL (ref 1–4.8)
LYMPHOCYTES NFR BLD: 14 % (ref 18–48)
MCH RBC QN AUTO: 29.9 PG (ref 27–31)
MCHC RBC AUTO-ENTMCNC: 30.6 G/DL (ref 32–36)
MCV RBC AUTO: 98 FL (ref 82–98)
MONOCYTES # BLD AUTO: 0.6 K/UL (ref 0.3–1)
MONOCYTES NFR BLD: 6 % (ref 4–15)
NEUTROPHILS # BLD AUTO: 7.2 K/UL (ref 1.8–7.7)
NEUTROPHILS NFR BLD: 76.1 % (ref 38–73)
NRBC BLD-RTO: 0 /100 WBC
PLATELET # BLD AUTO: 316 K/UL (ref 150–350)
PMV BLD AUTO: 11.3 FL (ref 9.2–12.9)
POTASSIUM SERPL-SCNC: 3.9 MMOL/L (ref 3.5–5.1)
PROT SERPL-MCNC: 6.5 G/DL (ref 6–8.4)
RBC # BLD AUTO: 4.28 M/UL (ref 4–5.4)
SARS-COV-2 RDRP RESP QL NAA+PROBE: NEGATIVE
SODIUM SERPL-SCNC: 146 MMOL/L (ref 136–145)
TROPONIN I SERPL DL<=0.01 NG/ML-MCNC: <0.006 NG/ML (ref 0–0.03)
WBC # BLD AUTO: 9.43 K/UL (ref 3.9–12.7)

## 2020-11-25 PROCEDURE — 69209 REMOVE IMPACTED EAR WAX UNI: CPT | Mod: 50

## 2020-11-25 PROCEDURE — 94761 N-INVAS EAR/PLS OXIMETRY MLT: CPT

## 2020-11-25 PROCEDURE — 80053 COMPREHEN METABOLIC PANEL: CPT

## 2020-11-25 PROCEDURE — 63600175 PHARM REV CODE 636 W HCPCS: Performed by: EMERGENCY MEDICINE

## 2020-11-25 PROCEDURE — 99900035 HC TECH TIME PER 15 MIN (STAT)

## 2020-11-25 PROCEDURE — 36600 WITHDRAWAL OF ARTERIAL BLOOD: CPT

## 2020-11-25 PROCEDURE — 25000003 PHARM REV CODE 250: Performed by: EMERGENCY MEDICINE

## 2020-11-25 PROCEDURE — 96374 THER/PROPH/DIAG INJ IV PUSH: CPT

## 2020-11-25 PROCEDURE — 94644 CONT INHLJ TX 1ST HOUR: CPT

## 2020-11-25 PROCEDURE — 93010 ELECTROCARDIOGRAM REPORT: CPT | Mod: ,,, | Performed by: INTERNAL MEDICINE

## 2020-11-25 PROCEDURE — 25000242 PHARM REV CODE 250 ALT 637 W/ HCPCS: Performed by: EMERGENCY MEDICINE

## 2020-11-25 PROCEDURE — 83880 ASSAY OF NATRIURETIC PEPTIDE: CPT

## 2020-11-25 PROCEDURE — 93005 ELECTROCARDIOGRAM TRACING: CPT

## 2020-11-25 PROCEDURE — 27100171 HC OXYGEN HIGH FLOW UP TO 24 HOURS

## 2020-11-25 PROCEDURE — 93010 EKG 12-LEAD: ICD-10-PCS | Mod: ,,, | Performed by: INTERNAL MEDICINE

## 2020-11-25 PROCEDURE — U0002 COVID-19 LAB TEST NON-CDC: HCPCS | Performed by: EMERGENCY MEDICINE

## 2020-11-25 PROCEDURE — 84484 ASSAY OF TROPONIN QUANT: CPT

## 2020-11-25 PROCEDURE — 99285 EMERGENCY DEPT VISIT HI MDM: CPT | Mod: 25

## 2020-11-25 PROCEDURE — 27000190 HC CPAP FULL FACE MASK W/VALVE

## 2020-11-25 PROCEDURE — 85025 COMPLETE CBC W/AUTO DIFF WBC: CPT

## 2020-11-25 PROCEDURE — 82803 BLOOD GASES ANY COMBINATION: CPT

## 2020-11-25 RX ORDER — ALBUTEROL SULFATE 2.5 MG/.5ML
10 SOLUTION RESPIRATORY (INHALATION)
Status: COMPLETED | OUTPATIENT
Start: 2020-11-25 | End: 2020-11-25

## 2020-11-25 RX ORDER — METHYLPREDNISOLONE SOD SUCC 125 MG
125 VIAL (EA) INJECTION
Status: COMPLETED | OUTPATIENT
Start: 2020-11-25 | End: 2020-11-25

## 2020-11-25 RX ORDER — IPRATROPIUM BROMIDE 0.5 MG/2.5ML
1.5 SOLUTION RESPIRATORY (INHALATION)
Status: COMPLETED | OUTPATIENT
Start: 2020-11-25 | End: 2020-11-25

## 2020-11-25 RX ORDER — DOCUSATE SODIUM 50 MG/5ML
50 LIQUID ORAL
Status: COMPLETED | OUTPATIENT
Start: 2020-11-25 | End: 2020-11-25

## 2020-11-25 RX ADMIN — DOCUSATE SODIUM 50 MG: 50 LIQUID ORAL at 09:11

## 2020-11-25 RX ADMIN — ALBUTEROL SULFATE 10 MG: 2.5 SOLUTION RESPIRATORY (INHALATION) at 10:11

## 2020-11-25 RX ADMIN — IPRATROPIUM BROMIDE 1.5 MG: 0.5 SOLUTION RESPIRATORY (INHALATION) at 10:11

## 2020-11-25 RX ADMIN — METHYLPREDNISOLONE SODIUM SUCCINATE 125 MG: 125 INJECTION, POWDER, FOR SOLUTION INTRAMUSCULAR; INTRAVENOUS at 09:11

## 2020-11-26 PROBLEM — J44.1 COPD EXACERBATION: Status: ACTIVE | Noted: 2020-11-26

## 2020-11-26 PROBLEM — J96.22 ACUTE ON CHRONIC RESPIRATORY FAILURE WITH HYPOXIA AND HYPERCAPNIA: Status: ACTIVE | Noted: 2020-11-26

## 2020-11-26 PROBLEM — J96.21 ACUTE ON CHRONIC RESPIRATORY FAILURE WITH HYPOXIA AND HYPERCAPNIA: Status: ACTIVE | Noted: 2020-11-26

## 2020-11-26 LAB
ALLENS TEST: ABNORMAL
ANION GAP SERPL CALC-SCNC: 7 MMOL/L (ref 8–16)
BASOPHILS # BLD AUTO: 0.01 K/UL (ref 0–0.2)
BASOPHILS NFR BLD: 0.2 % (ref 0–1.9)
BUN SERPL-MCNC: 19 MG/DL (ref 6–20)
CALCIUM SERPL-MCNC: 9.3 MG/DL (ref 8.7–10.5)
CHLORIDE SERPL-SCNC: 93 MMOL/L (ref 95–110)
CO2 SERPL-SCNC: 42 MMOL/L (ref 23–29)
CREAT SERPL-MCNC: 0.6 MG/DL (ref 0.5–1.4)
DELSYS: ABNORMAL
DIFFERENTIAL METHOD: ABNORMAL
EOSINOPHIL # BLD AUTO: 0 K/UL (ref 0–0.5)
EOSINOPHIL NFR BLD: 0 % (ref 0–8)
EP: 5
EP: 5
ERYTHROCYTE [DISTWIDTH] IN BLOOD BY AUTOMATED COUNT: 12.7 % (ref 11.5–14.5)
EST. GFR  (AFRICAN AMERICAN): >60 ML/MIN/1.73 M^2
EST. GFR  (NON AFRICAN AMERICAN): >60 ML/MIN/1.73 M^2
FIO2: 40
FIO2: 40
FLOW: 3
FLOW: 8
GLUCOSE SERPL-MCNC: 122 MG/DL (ref 70–110)
HCO3 UR-SCNC: 45.3 MMOL/L (ref 24–28)
HCO3 UR-SCNC: 45.6 MMOL/L (ref 24–28)
HCO3 UR-SCNC: 47.5 MMOL/L (ref 24–28)
HCO3 UR-SCNC: ABNORMAL MMOL/L
HCT VFR BLD AUTO: 41 % (ref 37–48.5)
HGB BLD-MCNC: 12.8 G/DL (ref 12–16)
IMM GRANULOCYTES # BLD AUTO: 0.03 K/UL (ref 0–0.04)
IMM GRANULOCYTES NFR BLD AUTO: 0.5 % (ref 0–0.5)
IP: 15
IP: 15
LYMPHOCYTES # BLD AUTO: 0.2 K/UL (ref 1–4.8)
LYMPHOCYTES NFR BLD: 4.2 % (ref 18–48)
MCH RBC QN AUTO: 29.4 PG (ref 27–31)
MCHC RBC AUTO-ENTMCNC: 31.2 G/DL (ref 32–36)
MCV RBC AUTO: 94 FL (ref 82–98)
MODE: ABNORMAL
MONOCYTES # BLD AUTO: 0 K/UL (ref 0.3–1)
MONOCYTES NFR BLD: 0.5 % (ref 4–15)
NEUTROPHILS # BLD AUTO: 5.2 K/UL (ref 1.8–7.7)
NEUTROPHILS NFR BLD: 94.6 % (ref 38–73)
NRBC BLD-RTO: 0 /100 WBC
PCO2 BLDA: 102.2 MMHG (ref 35–45)
PCO2 BLDA: 68.8 MMHG (ref 35–45)
PCO2 BLDA: 88 MMHG (ref 35–45)
PCO2 BLDA: >130 MMHG (ref 35–45)
PH SMN: 7.2 [PH] (ref 7.35–7.45)
PH SMN: 7.28 [PH] (ref 7.35–7.45)
PH SMN: 7.32 [PH] (ref 7.35–7.45)
PH SMN: 7.43 [PH] (ref 7.35–7.45)
PLATELET # BLD AUTO: 320 K/UL (ref 150–350)
PMV BLD AUTO: 12.3 FL (ref 9.2–12.9)
PO2 BLDA: 100 MMHG (ref 80–100)
PO2 BLDA: 235 MMHG (ref 80–100)
PO2 BLDA: 74 MMHG (ref 80–100)
PO2 BLDA: 76 MMHG (ref 80–100)
POC BE: 15 MMOL/L
POC BE: 16 MMOL/L
POC BE: 18 MMOL/L
POC BE: ABNORMAL MMOL/L
POC SATURATED O2: 91 % (ref 95–100)
POC SATURATED O2: 94 % (ref 95–100)
POC SATURATED O2: 97 % (ref 95–100)
POC SATURATED O2: ABNORMAL %
POC TCO2: 47 MMOL/L (ref 23–27)
POC TCO2: 48 MMOL/L (ref 23–27)
POC TCO2: >50 MMOL/L (ref 23–27)
POC TCO2: ABNORMAL MMOL/L
POTASSIUM SERPL-SCNC: 4.7 MMOL/L (ref 3.5–5.1)
RBC # BLD AUTO: 4.36 M/UL (ref 4–5.4)
SAMPLE: ABNORMAL
SITE: ABNORMAL
SODIUM SERPL-SCNC: 142 MMOL/L (ref 136–145)
SP02: 95
TSH SERPL DL<=0.005 MIU/L-ACNC: 0.68 UIU/ML (ref 0.4–4)
WBC # BLD AUTO: 5.49 K/UL (ref 3.9–12.7)

## 2020-11-26 PROCEDURE — 80048 BASIC METABOLIC PNL TOTAL CA: CPT

## 2020-11-26 PROCEDURE — 93005 ELECTROCARDIOGRAM TRACING: CPT

## 2020-11-26 PROCEDURE — 36600 WITHDRAWAL OF ARTERIAL BLOOD: CPT

## 2020-11-26 PROCEDURE — 82803 BLOOD GASES ANY COMBINATION: CPT

## 2020-11-26 PROCEDURE — 94640 AIRWAY INHALATION TREATMENT: CPT

## 2020-11-26 PROCEDURE — 25000242 PHARM REV CODE 250 ALT 637 W/ HCPCS: Performed by: NURSE PRACTITIONER

## 2020-11-26 PROCEDURE — 84443 ASSAY THYROID STIM HORMONE: CPT

## 2020-11-26 PROCEDURE — 93010 EKG 12-LEAD: ICD-10-PCS | Mod: ,,, | Performed by: INTERNAL MEDICINE

## 2020-11-26 PROCEDURE — 25000003 PHARM REV CODE 250: Performed by: NURSE PRACTITIONER

## 2020-11-26 PROCEDURE — 25000242 PHARM REV CODE 250 ALT 637 W/ HCPCS: Performed by: INTERNAL MEDICINE

## 2020-11-26 PROCEDURE — 93010 ELECTROCARDIOGRAM REPORT: CPT | Mod: ,,, | Performed by: INTERNAL MEDICINE

## 2020-11-26 PROCEDURE — 36415 COLL VENOUS BLD VENIPUNCTURE: CPT

## 2020-11-26 PROCEDURE — 99223 PR INITIAL HOSPITAL CARE,LEVL III: ICD-10-PCS | Mod: ,,, | Performed by: INTERNAL MEDICINE

## 2020-11-26 PROCEDURE — 99223 1ST HOSP IP/OBS HIGH 75: CPT | Mod: ,,, | Performed by: INTERNAL MEDICINE

## 2020-11-26 PROCEDURE — 94660 CPAP INITIATION&MGMT: CPT

## 2020-11-26 PROCEDURE — 25000003 PHARM REV CODE 250: Performed by: INTERNAL MEDICINE

## 2020-11-26 PROCEDURE — 99900035 HC TECH TIME PER 15 MIN (STAT)

## 2020-11-26 PROCEDURE — 63600175 PHARM REV CODE 636 W HCPCS: Performed by: NURSE PRACTITIONER

## 2020-11-26 PROCEDURE — 94761 N-INVAS EAR/PLS OXIMETRY MLT: CPT

## 2020-11-26 PROCEDURE — 25000003 PHARM REV CODE 250: Performed by: PHYSICIAN ASSISTANT

## 2020-11-26 PROCEDURE — 27000221 HC OXYGEN, UP TO 24 HOURS

## 2020-11-26 PROCEDURE — 21400001 HC TELEMETRY ROOM

## 2020-11-26 PROCEDURE — S4991 NICOTINE PATCH NONLEGEND: HCPCS | Performed by: PHYSICIAN ASSISTANT

## 2020-11-26 PROCEDURE — 85025 COMPLETE CBC W/AUTO DIFF WBC: CPT

## 2020-11-26 RX ORDER — ACETAMINOPHEN 500 MG
500 TABLET ORAL EVERY 6 HOURS PRN
Status: DISCONTINUED | OUTPATIENT
Start: 2020-11-26 | End: 2020-11-27 | Stop reason: HOSPADM

## 2020-11-26 RX ORDER — PAROXETINE HYDROCHLORIDE 20 MG/1
20 TABLET, FILM COATED ORAL DAILY
Status: DISCONTINUED | OUTPATIENT
Start: 2020-11-26 | End: 2020-11-27 | Stop reason: HOSPADM

## 2020-11-26 RX ORDER — DOXYCYCLINE HYCLATE 100 MG
100 TABLET ORAL EVERY 12 HOURS
Status: DISCONTINUED | OUTPATIENT
Start: 2020-11-26 | End: 2020-11-27 | Stop reason: HOSPADM

## 2020-11-26 RX ORDER — IBUPROFEN 200 MG
1 TABLET ORAL DAILY
Status: DISCONTINUED | OUTPATIENT
Start: 2020-11-27 | End: 2020-11-26

## 2020-11-26 RX ORDER — TIOTROPIUM BROMIDE 18 UG/1
1 CAPSULE ORAL; RESPIRATORY (INHALATION) DAILY
Status: DISCONTINUED | OUTPATIENT
Start: 2020-11-26 | End: 2020-11-27 | Stop reason: HOSPADM

## 2020-11-26 RX ORDER — ROFLUMILAST 500 UG/1
500 TABLET ORAL DAILY
Status: DISCONTINUED | OUTPATIENT
Start: 2020-11-26 | End: 2020-11-27 | Stop reason: HOSPADM

## 2020-11-26 RX ORDER — FLUTICASONE FUROATE AND VILANTEROL 100; 25 UG/1; UG/1
1 POWDER RESPIRATORY (INHALATION) DAILY
Status: DISCONTINUED | OUTPATIENT
Start: 2020-11-26 | End: 2020-11-27 | Stop reason: HOSPADM

## 2020-11-26 RX ORDER — IPRATROPIUM BROMIDE AND ALBUTEROL SULFATE 2.5; .5 MG/3ML; MG/3ML
3 SOLUTION RESPIRATORY (INHALATION) EVERY 4 HOURS
Status: DISCONTINUED | OUTPATIENT
Start: 2020-11-26 | End: 2020-11-26

## 2020-11-26 RX ORDER — ENOXAPARIN SODIUM 100 MG/ML
40 INJECTION SUBCUTANEOUS EVERY 24 HOURS
Status: DISCONTINUED | OUTPATIENT
Start: 2020-11-26 | End: 2020-11-27 | Stop reason: HOSPADM

## 2020-11-26 RX ORDER — MONTELUKAST SODIUM 10 MG/1
10 TABLET ORAL NIGHTLY
Status: DISCONTINUED | OUTPATIENT
Start: 2020-11-26 | End: 2020-11-27 | Stop reason: HOSPADM

## 2020-11-26 RX ORDER — SODIUM CHLORIDE 0.9 % (FLUSH) 0.9 %
10 SYRINGE (ML) INJECTION
Status: DISCONTINUED | OUTPATIENT
Start: 2020-11-26 | End: 2020-11-27 | Stop reason: HOSPADM

## 2020-11-26 RX ORDER — MIRTAZAPINE 15 MG/1
15 TABLET, FILM COATED ORAL NIGHTLY
Status: DISCONTINUED | OUTPATIENT
Start: 2020-11-26 | End: 2020-11-27 | Stop reason: HOSPADM

## 2020-11-26 RX ORDER — GABAPENTIN 300 MG/1
300 CAPSULE ORAL 3 TIMES DAILY
Status: DISCONTINUED | OUTPATIENT
Start: 2020-11-26 | End: 2020-11-27 | Stop reason: HOSPADM

## 2020-11-26 RX ORDER — IPRATROPIUM BROMIDE AND ALBUTEROL SULFATE 2.5; .5 MG/3ML; MG/3ML
3 SOLUTION RESPIRATORY (INHALATION) EVERY 4 HOURS
Status: COMPLETED | OUTPATIENT
Start: 2020-11-26 | End: 2020-11-26

## 2020-11-26 RX ORDER — SODIUM CHLORIDE 9 MG/ML
INJECTION, SOLUTION INTRAVENOUS CONTINUOUS
Status: DISCONTINUED | OUTPATIENT
Start: 2020-11-26 | End: 2020-11-27

## 2020-11-26 RX ORDER — ALBUTEROL SULFATE 2.5 MG/.5ML
10 SOLUTION RESPIRATORY (INHALATION) EVERY 6 HOURS PRN
Status: DISCONTINUED | OUTPATIENT
Start: 2020-11-26 | End: 2020-11-27 | Stop reason: HOSPADM

## 2020-11-26 RX ORDER — LEVALBUTEROL INHALATION SOLUTION 0.63 MG/3ML
0.63 SOLUTION RESPIRATORY (INHALATION) EVERY 8 HOURS
Status: DISCONTINUED | OUTPATIENT
Start: 2020-11-27 | End: 2020-11-27 | Stop reason: HOSPADM

## 2020-11-26 RX ORDER — AMOXICILLIN 250 MG
1 CAPSULE ORAL 2 TIMES DAILY
Status: DISCONTINUED | OUTPATIENT
Start: 2020-11-26 | End: 2020-11-27 | Stop reason: HOSPADM

## 2020-11-26 RX ORDER — IBUPROFEN 200 MG
1 TABLET ORAL DAILY
Status: DISCONTINUED | OUTPATIENT
Start: 2020-11-26 | End: 2020-11-27 | Stop reason: HOSPADM

## 2020-11-26 RX ORDER — ALBUTEROL SULFATE 90 UG/1
2 AEROSOL, METERED RESPIRATORY (INHALATION) EVERY 6 HOURS PRN
Status: DISCONTINUED | OUTPATIENT
Start: 2020-11-26 | End: 2020-11-26

## 2020-11-26 RX ADMIN — GABAPENTIN 300 MG: 300 CAPSULE ORAL at 09:11

## 2020-11-26 RX ADMIN — ROFLUMILAST 500 MCG: 500 TABLET ORAL at 09:11

## 2020-11-26 RX ADMIN — DOCUSATE SODIUM 50 MG AND SENNOSIDES 8.6 MG 1 TABLET: 8.6; 5 TABLET, FILM COATED ORAL at 09:11

## 2020-11-26 RX ADMIN — GABAPENTIN 300 MG: 300 CAPSULE ORAL at 03:11

## 2020-11-26 RX ADMIN — PAROXETINE HYDROCHLORIDE 20 MG: 20 TABLET, FILM COATED ORAL at 09:11

## 2020-11-26 RX ADMIN — IPRATROPIUM BROMIDE AND ALBUTEROL SULFATE 3 ML: .5; 3 SOLUTION RESPIRATORY (INHALATION) at 07:11

## 2020-11-26 RX ADMIN — MONTELUKAST 10 MG: 10 TABLET, FILM COATED ORAL at 09:11

## 2020-11-26 RX ADMIN — METHYLPREDNISOLONE SODIUM SUCCINATE 80 MG: 40 INJECTION, POWDER, FOR SOLUTION INTRAMUSCULAR; INTRAVENOUS at 03:11

## 2020-11-26 RX ADMIN — DOXYCYCLINE HYCLATE 100 MG: 100 TABLET, COATED ORAL at 09:11

## 2020-11-26 RX ADMIN — METHYLPREDNISOLONE SODIUM SUCCINATE 80 MG: 40 INJECTION, POWDER, FOR SOLUTION INTRAMUSCULAR; INTRAVENOUS at 09:11

## 2020-11-26 RX ADMIN — ENOXAPARIN SODIUM 40 MG: 40 INJECTION SUBCUTANEOUS at 04:11

## 2020-11-26 RX ADMIN — MIRTAZAPINE 15 MG: 15 TABLET, FILM COATED ORAL at 09:11

## 2020-11-26 RX ADMIN — DOXYCYCLINE HYCLATE 100 MG: 100 TABLET, COATED ORAL at 01:11

## 2020-11-26 RX ADMIN — FLUTICASONE FUROATE AND VILANTEROL 1 PUFF: 100; 25 POWDER RESPIRATORY (INHALATION) at 07:11

## 2020-11-26 RX ADMIN — LEVALBUTEROL HYDROCHLORIDE 0.63 MG: 0.63 SOLUTION RESPIRATORY (INHALATION) at 11:11

## 2020-11-26 RX ADMIN — Medication 1 PATCH: at 09:11

## 2020-11-26 RX ADMIN — IPRATROPIUM BROMIDE AND ALBUTEROL SULFATE 3 ML: .5; 3 SOLUTION RESPIRATORY (INHALATION) at 11:11

## 2020-11-26 RX ADMIN — IPRATROPIUM BROMIDE AND ALBUTEROL SULFATE 3 ML: .5; 3 SOLUTION RESPIRATORY (INHALATION) at 04:11

## 2020-11-26 NOTE — HPI
Patient is 60 y.o. female  has a past medical history of Colon polyp (10/3/2013), COPD (chronic obstructive pulmonary disease) with emphysema, DDD (degenerative disc disease), lumbar, GERD (gastroesophageal reflux disease), Pulmonary nodules, and Vitamin B 12 deficiency. presented to [facility] on 11/26/20 with sob.   Patient reports her power went out last night and she could not use use portable compressor or her nebulizer for her breathing treatment. She was able to use her emergency oxygen tank but it didn't help much with her SOB. Patient reports her SOB recently gets worse despite she is compliant with all her breathing treatments at home including weekly home infusions for alpha-1 antitrypsin deficiency.   She is followed by Dr. Cox as an outpatient.  Patient denied fever/chill.  No coughing or wheezing.  Initial abg with acute on chronic hypercapnic respiratory failure.  PCO2>130.  Patient was placed on bpap with improvement in wob and PCO2.  No fever/chill.  No chest pain.  No coughing or wheezing.      Patient still smoke ~1 ppd.  Smoking since age 14 years.

## 2020-11-26 NOTE — SUBJECTIVE & OBJECTIVE
Past Medical History:   Diagnosis Date    Colon polyp 10/3/2013    COPD (chronic obstructive pulmonary disease) with emphysema     DDD (degenerative disc disease), lumbar     GERD (gastroesophageal reflux disease)     Pulmonary nodules     Vitamin B 12 deficiency     bets B12 shots       Past Surgical History:   Procedure Laterality Date    BACK SURGERY      times 2 lumbar    HYSTERECTOMY      complete       Review of patient's allergies indicates:   Allergen Reactions    Hydrocodone Nausea And Vomiting and Other (See Comments)     Other reaction(s): upsets her stomach; severe abdominal cramping    Oxycodone Nausea And Vomiting     Other reaction(s): upsets stomach; severe abdominal cramps       Family History     Problem Relation (Age of Onset)    Breast cancer Sister    Cancer Mother, Sister    Heart disease Father, Brother    Ovarian cancer Mother        Tobacco Use    Smoking status: Current Every Day Smoker     Packs/day: 1.00     Years: 35.00     Pack years: 35.00     Types: Cigarettes     Start date: 5/22/1976    Smokeless tobacco: Current User   Substance and Sexual Activity    Alcohol use: Yes     Comment: occasional    Drug use: No    Sexual activity: Yes     Partners: Male     Birth control/protection: Surgical         Review of Systems   Constitutional: Negative for diaphoresis and fever.   HENT: Negative for congestion and sore throat.    Eyes: Negative for visual disturbance.   Respiratory: Positive for shortness of breath. Negative for cough and wheezing.    Cardiovascular: Negative for chest pain, palpitations and leg swelling.   Gastrointestinal: Negative for abdominal pain, blood in stool, diarrhea, nausea and vomiting.   Genitourinary: Negative for dysuria, flank pain and hematuria.   Musculoskeletal: Negative for arthralgias, myalgias, neck pain and neck stiffness.   Skin: Negative for wound.   Neurological: Negative for syncope, facial asymmetry and numbness.    Psychiatric/Behavioral: Negative for agitation and confusion.     Objective:     Vital Signs (Most Recent):  Temp: 98 °F (36.7 °C) (11/26/20 1055)  Pulse: 92 (11/26/20 1135)  Resp: 20 (11/26/20 1135)  BP: 139/66 (11/26/20 1055)  SpO2: 95 % (11/26/20 1135) Vital Signs (24h Range):  Temp:  [97.4 °F (36.3 °C)-98.1 °F (36.7 °C)] 98 °F (36.7 °C)  Pulse:  [] 92  Resp:  [16-26] 20  SpO2:  [90 %-99 %] 95 %  BP: ()/(52-82) 139/66     Weight: 35 kg (77 lb 2.6 oz)  Body mass index is 14.11 kg/m².    No intake or output data in the 24 hours ending 11/26/20 1149    Physical Exam  Constitutional:       Appearance: She is cachectic.   HENT:      Head: Normocephalic and atraumatic.      Nose: No congestion or rhinorrhea.   Eyes:      Pupils: Pupils are equal, round, and reactive to light.   Neck:      Musculoskeletal: Normal range of motion and neck supple.   Cardiovascular:      Rate and Rhythm: Regular rhythm. Tachycardia present.      Pulses: Normal pulses.      Heart sounds: Normal heart sounds.   Pulmonary:      Breath sounds: Decreased breath sounds and wheezing (faint) present. No rhonchi or rales.   Abdominal:      Tenderness: There is no abdominal tenderness. There is no right CVA tenderness, left CVA tenderness, guarding or rebound.   Musculoskeletal:         General: No swelling or tenderness.      Right lower leg: No edema.      Left lower leg: No edema.   Neurological:      Mental Status: She is alert and oriented to person, place, and time.   Psychiatric:         Mood and Affect: Mood normal.         Thought Content: Thought content normal.         Judgment: Judgment normal.         Vents:  Oxygen Concentration (%): 40 (11/26/20 1135)    Lines/Drains/Airways     Peripheral Intravenous Line                 Peripheral IV - Single Lumen 18 G Left Antecubital -- days                Significant Labs:    CBC/Anemia Profile:  Recent Labs   Lab 11/25/20  2140 11/26/20  0505   WBC 9.43 5.49   HGB 12.8 12.8   HCT  41.8 41.0    320   MCV 98 94   RDW 12.7 12.7        Chemistries:  Recent Labs   Lab 11/25/20 2140 11/26/20  0505   * 142   K 3.9 4.7   CL 92* 93*   CO2 45* 42*   BUN 13 19   CREATININE 0.6 0.6   CALCIUM 9.5 9.3   ALBUMIN 3.4*  --    PROT 6.5  --    BILITOT 0.3  --    ALKPHOS 74  --    ALT 9*  --    AST 11  --      BNP  Recent Labs   Lab 11/25/20 2140   BNP 19     Echo 5/22/15  CONCLUSIONS     1 - Normal left ventricular systolic function (EF 55-60%).     2 - Normal left ventricular diastolic function.     3 - Normal right ventricular systolic function .     4 - Intermediate central venous pressure.     5/22/15 ratio of 36%; fvc 68%; fev1 30%; tlc 102%; dlco 41%    ABGs:   Recent Labs   Lab 11/26/20  1121   PH 7.322*   PCO2 88.0*   HCO3 45.6*   POCSATURATED 97   BE 15       Significant Imaging:   CT: I have reviewed all pertinent results/findings within the past 24 hours and my personal findings are:  5/21/15 severe emphysematous changes.    CXR: I have reviewed all pertinent results/findings within the past 24 hours and my personal findings are:  hyperinflated.  no effusion or consolidation.  ?Left mid lungs nodule.

## 2020-11-26 NOTE — ED NOTES
R ear was successfully evacuated and verified by Dr. Roa. Pt tolerated well. Pt continues on bipap. VS. Will continue to monitor closely.

## 2020-11-26 NOTE — RESPIRATORY THERAPY
Pt transported to 326 via Bipap. Pt tolerated transport well. Settings as documented with all alarms set and functional. Report given to ELVIRA Boss.

## 2020-11-26 NOTE — PLAN OF CARE
Patient seen and examined.    States she feels breathing is better since admission.     Vitals: T 98 F, HR 92, 139/66, spO2 95% on FiO2 40%     Exam:   On bipap during exam  Alert and oriented. Able to answer questions coherently  Heart with reg rate rhythm. Extremities warm. No edema  Resp sounds - transmitted bipap sounds. No audible wheezing.   Abdomen soft and non tender    Labs: ABG pH 7.32, pCO2 88 - much improved (prev pCO2 102.2)    Assessment and plan:    Agree with management overnight  Pulm consult - appreciate eval  Bipap QHS  Prednisone changed to PO  Schedule duo-nebs q 4 hours     Jhonatan Khan, [facility]  Cell: [phone]  Pager: [phone]  Email: [email or web address]

## 2020-11-26 NOTE — ASSESSMENT & PLAN NOTE
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 On Daliresp, alpha -1 proteinase, symbicort, singulair, spiriva.    - See above for COPD exacerbation  - Will resume home regimen

## 2020-11-26 NOTE — ED NOTES
Pt resting w/ eyes closed and in no obvious distress. Pt remains on bipap. NO respiratory distress observed. VS. Will continue to monitor closely.

## 2020-11-26 NOTE — ASSESSMENT & PLAN NOTE
-fev1 30% in 2015.  Followed by Dr. Cox  -Precipitated by power outage.    -Feeling like she is back to baseline.  Will wean to po prednisone.  Continue with lama, laba/ics and daliresp.   -turn down for lung transplantation enlistment in the past due to smoking history.  May be reasonable to refer back to transplant if patient is willing to quit smoking.

## 2020-11-26 NOTE — ED NOTES
Pt c/o SOB since her power went out earlier this PM. Pt is on home 02 at 3LPM.  was able to give hjer half of a nebulizer treatment on a back-up oxygen tank.  states pt has become increasingly weaker over the last month to where she is too weak to walk and requires diapers now. Pt also states her ears are stopped up and suspects she has ear wax in the way. Pt is A & O x 3, +SOB. Skin is warm, dry and pink. VS. HOSSEIN x 3mm. BBS- coarse and decreased w/ poor air exchange to all lobes. Abd- SNT. PSM x 4 exts. Pt is connected to the pulse ox, B/P cuff and EKg monitor. Bed is locked and in the low positon w/ the side rails up and locked for pt safety. Call bell @ the Bs. Will continue to monitor closely.

## 2020-11-26 NOTE — HPI
Renée Lucas is a 61 y/o female with COPD with emphysema chronically on O2, pulmonary nodules, GERD, and degenerative disk disease of lumbar spine who presents to the hospital complaining of acute severe shortness of breath. Patient reports her power went out last night and she could not use use portable compressor or her nebulizer for her breathing treatment. She was able to use her emergency oxygen tank but it didn't help much with her SOB. Patient reports her SOB recently gets worse despite she is compliant with all her breathing treatments at home including weekly home infusions for alpha-1 antitrypsin deficiency. Her last infusion was yesterday Wednesday 11/25/20. Patient also complains of hearing loss bilaterally recently. She denies any fever, worsening cough, chest pain, abdominal pain, N/V/D, leg swelling, or other associated symptoms.  She was placed on 12L oxygen via non-rebreather with EMS and had some improvement in respiratory distress.     In ED, patient was placed in BIPAP due to severe elevation in pCO2 > 130 and acidosis with pH 7.2. Repeat ABG after BIPAP showed improvement in pCO2 to 102.    Patient is admitted to the hospital with Hospital Medicine for COPD exacerbation with significant hypercarbia.

## 2020-11-26 NOTE — H&P
[facility] Hospital Medicine  History & Physical    Patient Name: Renée Lucas  MRN: [id number]  Admission Date: 11/25/2020  Attending Physician: Jhonatan Khan DO   Primary Care Provider: Eric Gaines MD         Patient information was obtained from patient and ER records.     Subjective:     Principal Problem:COPD exacerbation    Chief Complaint:   Chief Complaint   Patient presents with    Shortness of Breath     Pt c/o shortness of breathe that she has been dealing with since her electricity went out. Patient with medical Hx of issues that makes her respiratory issues worsen.        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 Patient reports her power went out last night and she could not use use portable compressor or her nebulizer for her breathing treatment. She was able to use her emergency oxygen tank but it didn't help much with her SOB. Patient reports her SOB recently gets worse despite she is compliant with all her breathing treatments at home including weekly home infusions for alpha-1 antitrypsin deficiency. Her last infusion was yesterday Wednesday 11/25/20. Patient also complains of hearing loss bilaterally recently. She denies any fever, worsening cough, chest pain, abdominal pain, N/V/D, leg swelling, or other associated symptoms.  She was placed on 12L oxygen via non-rebreather with EMS and had some improvement in respiratory distress.     In ED, patient was placed in BIPAP due to severe elevation in pCO2 > 130 and acidosis with pH 7.2. Repeat ABG after BIPAP showed improvement in pCO2 to 102.    Patient is admitted to the hospital with Hospital Medicine for COPD exacerbation with significant hypercarbia.        Past Medical History:   Diagnosis Date    Colon polyp 10/3/2013    COPD  (chronic obstructive pulmonary disease) with emphysema     DDD (degenerative disc disease), lumbar     GERD (gastroesophageal reflux disease)     Pulmonary nodules     Vitamin B 12 deficiency     bets B12 shots       Past Surgical History:   Procedure Laterality Date    BACK SURGERY      times 2 lumbar    HYSTERECTOMY      complete       Review of patient's allergies indicates:   Allergen Reactions    Hydrocodone Nausea And Vomiting and Other (See Comments)     Other reaction(s): upsets her stomach; severe abdominal cramping    Oxycodone Nausea And Vomiting     Other reaction(s): upsets stomach; severe abdominal cramps       No current facility-administered medications on file prior to encounter.      Current Outpatient Medications on File Prior to Encounter   Medication Sig    albuterol (PROVENTIL/VENTOLIN HFA) 90 mcg/actuation inhaler INHALE 2 PUFFS PO Q 4 TO 6 H PRF SOB / WHEEZING    alpha-1proteinase 1,000 mg SolR Inject into the vein.    azelastine (ASTELIN) 137 mcg (0.1 %) nasal spray SPRAY TWICE IN NASAL BID    budesonide-formoterol 160-4.5 mcg (SYMBICORT) 160-4.5 mcg/actuation HFAA INHALE 2 PUFFS BY MOUTH TWICE DAILY    DALIRESP 500 mcg Tab Take 500 mcg by mouth once daily.     estradioL (ESTRACE) 1 MG tablet TAKE 1 TABLET(1 MG) BY MOUTH EVERY DAY    fluticasone (FLONASE) 50 mcg/actuation nasal spray SPRAY TWICE NASAL DAILY    montelukast (SINGULAIR) 10 mg tablet TAKE 1 TABLET BY MOUTH EVERY EVENING    paroxetine (PAXIL) 20 MG tablet Take 1 tablet (20 mg total) by mouth once daily.    PROAIR HFA 90 mcg/actuation inhaler INHALE 2 PUFFS PO Q 4 TO 6 H PRF SOB / WHEEZING    SPIRIVA RESPIMAT 2.5 mcg/actuation Mist INL 2 PUFFS PO D    gabapentin (NEURONTIN) 300 MG capsule TK 1 C PO TID    ketorolac (TORADOL) 10 mg tablet Take 1 tablet (10 mg total) by mouth every 6 (six) hours.    lidocaine (LIDODERM) 5 % Place 1 patch onto the skin once daily. Remove & Discard patch within 12 hours or as  directed by MD    meloxicam (MOBIC) 7.5 MG tablet Take 1 tablet (7.5 mg total) by mouth once daily.    mirtazapine (REMERON) 15 MG tablet TAKE 1 TABLET(15 MG) BY MOUTH EVERY EVENING    tramadol (ULTRAM) 50 mg tablet TK 1 TO 2 TS PO Q 4 TO 6 H PRN P     Family History     Problem Relation (Age of Onset)    Breast cancer Sister    Cancer Mother, Sister    Heart disease Father, Brother    Ovarian cancer Mother        Tobacco Use    Smoking status: Current Every Day Smoker     Packs/day: 1.00     Years: 35.00     Pack years: 35.00     Types: Cigarettes     Start date: 5/22/1976    Smokeless tobacco: Current User   Substance and Sexual Activity    Alcohol use: Yes     Comment: occasional    Drug use: No    Sexual activity: Yes     Partners: Male     Birth control/protection: Surgical     Review of Systems   Constitutional: Negative for diaphoresis and fever.   HENT: Negative for congestion and sore throat.    Eyes: Negative for visual disturbance.   Respiratory: Positive for cough, shortness of breath and wheezing.    Cardiovascular: Negative for chest pain, palpitations and leg swelling.   Gastrointestinal: Negative for abdominal pain, blood in stool, diarrhea, nausea and vomiting.   Genitourinary: Negative for dysuria, flank pain and hematuria.   Musculoskeletal: Negative for arthralgias, myalgias, neck pain and neck stiffness.   Skin: Negative for wound.   Neurological: Negative for syncope, facial asymmetry and numbness.   Psychiatric/Behavioral: Negative for confusion.     Objective:     Vital Signs (Most Recent):  Temp: 98 °F (36.7 °C) (11/26/20 0442)  Pulse: 106 (11/26/20 0442)  Resp: 20 (11/26/20 0442)  BP: 118/68 (11/26/20 0442)  SpO2: 96 % (11/26/20 0442) Vital Signs (24h Range):  Temp:  [97.4 °F (36.3 °C)-98.1 °F (36.7 °C)] 98 °F (36.7 °C)  Pulse:  [] 106  Resp:  [17-26] 20  SpO2:  [93 %-99 %] 96 %  BP: (101-137)/(56-82) 118/68     Weight: 35 kg (77 lb 2.6 oz)  Body mass index is 14.11  kg/m².    Physical Exam  Constitutional:       Appearance: She is cachectic.   HENT:      Head: Normocephalic and atraumatic.      Nose: No congestion or rhinorrhea.   Eyes:      Pupils: Pupils are equal, round, and reactive to light.   Neck:      Musculoskeletal: Normal range of motion and neck supple.   Cardiovascular:      Rate and Rhythm: Regular rhythm. Tachycardia present.      Pulses: Normal pulses.      Heart sounds: Normal heart sounds.   Pulmonary:      Breath sounds: Decreased breath sounds and wheezing (faint) present. No rhonchi or rales.   Abdominal:      Tenderness: There is no abdominal tenderness. There is no right CVA tenderness, left CVA tenderness, guarding or rebound.   Musculoskeletal:         General: No swelling or tenderness.      Right lower leg: No edema.      Left lower leg: No edema.   Neurological:      Mental Status: She is alert and oriented to person, place, and time.   Psychiatric:         Mood and Affect: Mood normal.         Thought Content: Thought content normal.         Judgment: Judgment normal.           CRANIAL NERVES     CN III, IV, VI   Pupils are equal, round, and reactive to light.       Significant Labs:     Recent Results (from the past 24 hour(s))   CBC auto differential    Collection Time: 11/25/20  9:40 PM   Result Value Ref Range    WBC 9.43 3.90 - 12.70 K/uL    RBC 4.28 4.00 - 5.40 M/uL    Hemoglobin 12.8 12.0 - 16.0 g/dL    Hematocrit 41.8 37.0 - 48.5 %    MCV 98 82 - 98 fL    MCH 29.9 27.0 - 31.0 pg    MCHC 30.6 (L) 32.0 - 36.0 g/dL    RDW 12.7 11.5 - 14.5 %    Platelets 316 150 - 350 K/uL    MPV 11.3 9.2 - 12.9 fL    Immature Granulocytes 0.3 0.0 - 0.5 %    Gran # (ANC) 7.2 1.8 - 7.7 K/uL    Immature Grans (Abs) 0.03 0.00 - 0.04 K/uL    Lymph # 1.3 1.0 - 4.8 K/uL    Mono # 0.6 0.3 - 1.0 K/uL    Eos # 0.3 0.0 - 0.5 K/uL    Baso # 0.05 0.00 - 0.20 K/uL    nRBC 0 0 /100 WBC    Gran % 76.1 (H) 38.0 - 73.0 %    Lymph % 14.0 (L) 18.0 - 48.0 %    Mono % 6.0 4.0 - 15.0  %    Eosinophil % 3.1 0.0 - 8.0 %    Basophil % 0.5 0.0 - 1.9 %    Differential Method Automated    Comprehensive metabolic panel    Collection Time: 11/25/20  9:40 PM   Result Value Ref Range    Sodium 146 (H) 136 - 145 mmol/L    Potassium 3.9 3.5 - 5.1 mmol/L    Chloride 92 (L) 95 - 110 mmol/L    CO2 45 (HH) 23 - 29 mmol/L    Glucose 151 (H) 70 - 110 mg/dL    BUN 13 6 - 20 mg/dL    Creatinine 0.6 0.5 - 1.4 mg/dL    Calcium 9.5 8.7 - 10.5 mg/dL    Total Protein 6.5 6.0 - 8.4 g/dL    Albumin 3.4 (L) 3.5 - 5.2 g/dL    Total Bilirubin 0.3 0.1 - 1.0 mg/dL    Alkaline Phosphatase 74 55 - 135 U/L    AST 11 10 - 40 U/L    ALT 9 (L) 10 - 44 U/L    Anion Gap 9 8 - 16 mmol/L    eGFR if African American >60 >60 mL/min/1.73 m^2    eGFR if non African American >60 >60 mL/min/1.73 m^2   Troponin I    Collection Time: 11/25/20  9:40 PM   Result Value Ref Range    Troponin I <0.006 0.000 - 0.026 ng/mL   Brain natriuretic peptide    Collection Time: 11/25/20  9:40 PM   Result Value Ref Range    BNP 19 0 - 99 pg/mL   POCT COVID-19 Rapid Screening    Collection Time: 11/25/20 10:06 PM   Result Value Ref Range    POC Rapid COVID Negative Negative     Acceptable Yes    ISTAT PROCEDURE    Collection Time: 11/25/20 11:18 PM   Result Value Ref Range    POC PH 7.200 (LL) 7.35 - 7.45    POC PCO2 >130.0 (H) 35 - 45 mmHg    POC PO2 235 (H) 80 - 100 mmHg    POC HCO3 UNAVAILABLE mmol/L    POC BE UNAVAILABLE mmol/L    POC SATURATED O2 UNAVAILABLE %    POC TCO2 UNAVAILABLE mmol/L    Sample ARTERIAL     Site RB     Allens Test Pass     DelSys Aero Mask     Mode SPONT     Flow 8    ISTAT PROCEDURE    Collection Time: 11/26/20  1:57 AM   Result Value Ref Range    POC PH 7.276 (LL) 7.35 - 7.45    POC PCO2 102.2 (HH) 35 - 45 mmHg    POC PO2 76 (L) 80 - 100 mmHg    POC HCO3 47.5 (H) 24 - 28 mmol/L    POC BE 16 -2 to 2 mmol/L    POC SATURATED O2 91 (L) 95 - 100 %    POC TCO2 >50 (H) 23 - 27 mmol/L    Sample ARTERIAL     Site INEZ Melchor  Test Pass     DelSys CPAP/BiPAP     Mode BiPAP     FiO2 40     Sp02 95     IP 15     EP 5    CBC Auto Differential    Collection Time: 11/26/20  5:05 AM   Result Value Ref Range    WBC 5.49 3.90 - 12.70 K/uL    RBC 4.36 4.00 - 5.40 M/uL    Hemoglobin 12.8 12.0 - 16.0 g/dL    Hematocrit 41.0 37.0 - 48.5 %    MCV 94 82 - 98 fL    MCH 29.4 27.0 - 31.0 pg    MCHC 31.2 (L) 32.0 - 36.0 g/dL    RDW 12.7 11.5 - 14.5 %    Platelets 320 150 - 350 K/uL    MPV 12.3 9.2 - 12.9 fL    Immature Granulocytes 0.5 0.0 - 0.5 %    Gran # (ANC) 5.2 1.8 - 7.7 K/uL    Immature Grans (Abs) 0.03 0.00 - 0.04 K/uL    Lymph # 0.2 (L) 1.0 - 4.8 K/uL    Mono # 0.0 (L) 0.3 - 1.0 K/uL    Eos # 0.0 0.0 - 0.5 K/uL    Baso # 0.01 0.00 - 0.20 K/uL    nRBC 0 0 /100 WBC    Gran % 94.6 (H) 38.0 - 73.0 %    Lymph % 4.2 (L) 18.0 - 48.0 %    Mono % 0.5 (L) 4.0 - 15.0 %    Eosinophil % 0.0 0.0 - 8.0 %    Basophil % 0.2 0.0 - 1.9 %    Differential Method Automated          Significant Imaging:     Imaging Results          X-Ray Chest AP Portable (Final result)  Result time 11/25/20 22:01:33    Final result by Domenico Rocha MD (11/25/20 22:01:33)                 Impression:      No acute intrathoracic process.    Changes of pulmonary emphysema.    Apparent 1.6 cm nodule in the left midlung zone.  A lateral projection may be obtained to confirm intrathoracic location.      Electronically signed by: Domenico Rocha MD  Date:    11/25/2020  Time:    22:01             Narrative:    EXAMINATION:  XR CHEST AP PORTABLE    CLINICAL HISTORY:  Shortness of breath    TECHNIQUE:  Single frontal view of the chest was performed.    COMPARISON:  10/19/2020.    FINDINGS:  Monitoring EKG leads are present.  The trachea is unremarkable.  There are calcifications of the aortic knob.  The cardiomediastinal silhouette is within normal limits.  There is no evidence of free air beneath the hemidiaphragms.  There are no pleural effusions.  There is no evidence of a pneumothorax there is  no evidence of pneumomediastinum.  There are chronic interstitial findings.  There are changes of pulmonary emphysema.  There is an apparent 1.6 cm nodule in the left midlung zone.  There are chronic right-sided rib deformities.  There are degenerative changes in the osseous structures.                              Assessment/Plan:     * COPD exacerbation  Acute on chronic issue after not able to use her portable compressor or her nebulizer for her breathing treatment due to no power at home. Presented with acidosis pH 7.2 and pCO2 > 130 on admission. Improved with BIPAP    - Continue BiPAP  - Continue IV steroid  - Continue duo-nebs           Alpha-1-antitrypsin deficiency  On weekly infusion alpha-1 proteinase. Last infusion was yesterday Wednesday 11/25/20.      Pulmonary nodules  Chest xray today reveals 1.6 cm nodule in the left midlung zone which is new finding. CT scan in 5/2015 showed pulmonary nodules on the right lobe.    - Can follow up outpatient Pulmonology for pulmonary nodules      Chronic obstructive pulmonary disease  COPD with pulmonary emphysema with alpha-1 antitrypsin deficiency. On Daliresp, alpha -1 proteinase, symbicort, singulair, spiriva.    - See above for COPD exacerbation  - Will resume home regimen        VTE Risk Mitigation (From admission, onward)         Ordered     enoxaparin injection 40 mg  Every 24 hours      11/26/20 0640     Place FAVIAN hose  Until discontinued      11/26/20 0640     IP VTE HIGH RISK PATIENT  Once      11/26/20 0640     Place sequential compression device  Until discontinued      11/26/20 0640               As clarification, on 11/26/2020  , patient should be admitted for hospital observation services under my care in collaboration with Dr. Jhonatan Goodman NP  Department of Hospital Medicine   [facility]

## 2020-11-26 NOTE — SUBJECTIVE & OBJECTIVE
Past Medical History:   Diagnosis Date    Colon polyp 10/3/2013    COPD (chronic obstructive pulmonary disease) with emphysema     DDD (degenerative disc disease), lumbar     GERD (gastroesophageal reflux disease)     Pulmonary nodules     Vitamin B 12 deficiency     bets B12 shots       Past Surgical History:   Procedure Laterality Date    BACK SURGERY      times 2 lumbar    HYSTERECTOMY      complete       Review of patient's allergies indicates:   Allergen Reactions    Hydrocodone Nausea And Vomiting and Other (See Comments)     Other reaction(s): upsets her stomach; severe abdominal cramping    Oxycodone Nausea And Vomiting     Other reaction(s): upsets stomach; severe abdominal cramps       No current facility-administered medications on file prior to encounter.      Current Outpatient Medications on File Prior to Encounter   Medication Sig    albuterol (PROVENTIL/VENTOLIN HFA) 90 mcg/actuation inhaler INHALE 2 PUFFS PO Q 4 TO 6 H PRF SOB / WHEEZING    alpha-1proteinase 1,000 mg SolR Inject into the vein.    azelastine (ASTELIN) 137 mcg (0.1 %) nasal spray SPRAY TWICE IN NASAL BID    budesonide-formoterol 160-4.5 mcg (SYMBICORT) 160-4.5 mcg/actuation HFAA INHALE 2 PUFFS BY MOUTH TWICE DAILY    DALIRESP 500 mcg Tab Take 500 mcg by mouth once daily.     estradioL (ESTRACE) 1 MG tablet TAKE 1 TABLET(1 MG) BY MOUTH EVERY DAY    fluticasone (FLONASE) 50 mcg/actuation nasal spray SPRAY TWICE NASAL DAILY    montelukast (SINGULAIR) 10 mg tablet TAKE 1 TABLET BY MOUTH EVERY EVENING    paroxetine (PAXIL) 20 MG tablet Take 1 tablet (20 mg total) by mouth once daily.    PROAIR HFA 90 mcg/actuation inhaler INHALE 2 PUFFS PO Q 4 TO 6 H PRF SOB / WHEEZING    SPIRIVA RESPIMAT 2.5 mcg/actuation Mist INL 2 PUFFS PO D    gabapentin (NEURONTIN) 300 MG capsule TK 1 C PO TID    ketorolac (TORADOL) 10 mg tablet Take 1 tablet (10 mg total) by mouth every 6 (six) hours.    lidocaine (LIDODERM) 5 % Place 1 patch  onto the skin once daily. Remove & Discard patch within 12 hours or as directed by MD    meloxicam (MOBIC) 7.5 MG tablet Take 1 tablet (7.5 mg total) by mouth once daily.    mirtazapine (REMERON) 15 MG tablet TAKE 1 TABLET(15 MG) BY MOUTH EVERY EVENING    tramadol (ULTRAM) 50 mg tablet TK 1 TO 2 TS PO Q 4 TO 6 H PRN P     Family History     Problem Relation (Age of Onset)    Breast cancer Sister    Cancer Mother, Sister    Heart disease Father, Brother    Ovarian cancer Mother        Tobacco Use    Smoking status: Current Every Day Smoker     Packs/day: 1.00     Years: 35.00     Pack years: 35.00     Types: Cigarettes     Start date: 5/22/1976    Smokeless tobacco: Current User   Substance and Sexual Activity    Alcohol use: Yes     Comment: occasional    Drug use: No    Sexual activity: Yes     Partners: Male     Birth control/protection: Surgical     Review of Systems   Constitutional: Negative for diaphoresis and fever.   HENT: Negative for congestion and sore throat.    Eyes: Negative for visual disturbance.   Respiratory: Positive for cough, shortness of breath and wheezing.    Cardiovascular: Negative for chest pain, palpitations and leg swelling.   Gastrointestinal: Negative for abdominal pain, blood in stool, diarrhea, nausea and vomiting.   Genitourinary: Negative for dysuria, flank pain and hematuria.   Musculoskeletal: Negative for arthralgias, myalgias, neck pain and neck stiffness.   Skin: Negative for wound.   Neurological: Negative for syncope, facial asymmetry and numbness.   Psychiatric/Behavioral: Negative for confusion.     Objective:     Vital Signs (Most Recent):  Temp: 98 °F (36.7 °C) (11/26/20 0442)  Pulse: 106 (11/26/20 0442)  Resp: 20 (11/26/20 0442)  BP: 118/68 (11/26/20 0442)  SpO2: 96 % (11/26/20 0442) Vital Signs (24h Range):  Temp:  [97.4 °F (36.3 °C)-98.1 °F (36.7 °C)] 98 °F (36.7 °C)  Pulse:  [] 106  Resp:  [17-26] 20  SpO2:  [93 %-99 %] 96 %  BP: (101-137)/(56-82)  118/68     Weight: 35 kg (77 lb 2.6 oz)  Body mass index is 14.11 kg/m².    Physical Exam  Constitutional:       Appearance: She is cachectic.   HENT:      Head: Normocephalic and atraumatic.      Nose: No congestion or rhinorrhea.   Eyes:      Pupils: Pupils are equal, round, and reactive to light.   Neck:      Musculoskeletal: Normal range of motion and neck supple.   Cardiovascular:      Rate and Rhythm: Regular rhythm. Tachycardia present.      Pulses: Normal pulses.      Heart sounds: Normal heart sounds.   Pulmonary:      Breath sounds: Decreased breath sounds and wheezing (faint) present. No rhonchi or rales.   Abdominal:      Tenderness: There is no abdominal tenderness. There is no right CVA tenderness, left CVA tenderness, guarding or rebound.   Musculoskeletal:         General: No swelling or tenderness.      Right lower leg: No edema.      Left lower leg: No edema.   Neurological:      Mental Status: She is alert and oriented to person, place, and time.   Psychiatric:         Mood and Affect: Mood normal.         Thought Content: Thought content normal.         Judgment: Judgment normal.           CRANIAL NERVES     CN III, IV, VI   Pupils are equal, round, and reactive to light.       Significant Labs:     Recent Results (from the past 24 hour(s))   CBC auto differential    Collection Time: 11/25/20  9:40 PM   Result Value Ref Range    WBC 9.43 3.90 - 12.70 K/uL    RBC 4.28 4.00 - 5.40 M/uL    Hemoglobin 12.8 12.0 - 16.0 g/dL    Hematocrit 41.8 37.0 - 48.5 %    MCV 98 82 - 98 fL    MCH 29.9 27.0 - 31.0 pg    MCHC 30.6 (L) 32.0 - 36.0 g/dL    RDW 12.7 11.5 - 14.5 %    Platelets 316 150 - 350 K/uL    MPV 11.3 9.2 - 12.9 fL    Immature Granulocytes 0.3 0.0 - 0.5 %    Gran # (ANC) 7.2 1.8 - 7.7 K/uL    Immature Grans (Abs) 0.03 0.00 - 0.04 K/uL    Lymph # 1.3 1.0 - 4.8 K/uL    Mono # 0.6 0.3 - 1.0 K/uL    Eos # 0.3 0.0 - 0.5 K/uL    Baso # 0.05 0.00 - 0.20 K/uL    nRBC 0 0 /100 WBC    Gran % 76.1 (H) 38.0 -  73.0 %    Lymph % 14.0 (L) 18.0 - 48.0 %    Mono % 6.0 4.0 - 15.0 %    Eosinophil % 3.1 0.0 - 8.0 %    Basophil % 0.5 0.0 - 1.9 %    Differential Method Automated    Comprehensive metabolic panel    Collection Time: 11/25/20  9:40 PM   Result Value Ref Range    Sodium 146 (H) 136 - 145 mmol/L    Potassium 3.9 3.5 - 5.1 mmol/L    Chloride 92 (L) 95 - 110 mmol/L    CO2 45 (HH) 23 - 29 mmol/L    Glucose 151 (H) 70 - 110 mg/dL    BUN 13 6 - 20 mg/dL    Creatinine 0.6 0.5 - 1.4 mg/dL    Calcium 9.5 8.7 - 10.5 mg/dL    Total Protein 6.5 6.0 - 8.4 g/dL    Albumin 3.4 (L) 3.5 - 5.2 g/dL    Total Bilirubin 0.3 0.1 - 1.0 mg/dL    Alkaline Phosphatase 74 55 - 135 U/L    AST 11 10 - 40 U/L    ALT 9 (L) 10 - 44 U/L    Anion Gap 9 8 - 16 mmol/L    eGFR if African American >60 >60 mL/min/1.73 m^2    eGFR if non African American >60 >60 mL/min/1.73 m^2   Troponin I    Collection Time: 11/25/20  9:40 PM   Result Value Ref Range    Troponin I <0.006 0.000 - 0.026 ng/mL   Brain natriuretic peptide    Collection Time: 11/25/20  9:40 PM   Result Value Ref Range    BNP 19 0 - 99 pg/mL   POCT COVID-19 Rapid Screening    Collection Time: 11/25/20 10:06 PM   Result Value Ref Range    POC Rapid COVID Negative Negative     Acceptable Yes    ISTAT PROCEDURE    Collection Time: 11/25/20 11:18 PM   Result Value Ref Range    POC PH 7.200 (LL) 7.35 - 7.45    POC PCO2 >130.0 (H) 35 - 45 mmHg    POC PO2 235 (H) 80 - 100 mmHg    POC HCO3 UNAVAILABLE mmol/L    POC BE UNAVAILABLE mmol/L    POC SATURATED O2 UNAVAILABLE %    POC TCO2 UNAVAILABLE mmol/L    Sample ARTERIAL     Site RB     Allens Test Pass     DelSys Aero Mask     Mode SPONT     Flow 8    ISTAT PROCEDURE    Collection Time: 11/26/20  1:57 AM   Result Value Ref Range    POC PH 7.276 (LL) 7.35 - 7.45    POC PCO2 102.2 (HH) 35 - 45 mmHg    POC PO2 76 (L) 80 - 100 mmHg    POC HCO3 47.5 (H) 24 - 28 mmol/L    POC BE 16 -2 to 2 mmol/L    POC SATURATED O2 91 (L) 95 - 100 %    POC  TCO2 >50 (H) 23 - 27 mmol/L    Sample ARTERIAL     Site RB     Allens Test Pass     DelSys CPAP/BiPAP     Mode BiPAP     FiO2 40     Sp02 95     IP 15     EP 5    CBC Auto Differential    Collection Time: 11/26/20  5:05 AM   Result Value Ref Range    WBC 5.49 3.90 - 12.70 K/uL    RBC 4.36 4.00 - 5.40 M/uL    Hemoglobin 12.8 12.0 - 16.0 g/dL    Hematocrit 41.0 37.0 - 48.5 %    MCV 94 82 - 98 fL    MCH 29.4 27.0 - 31.0 pg    MCHC 31.2 (L) 32.0 - 36.0 g/dL    RDW 12.7 11.5 - 14.5 %    Platelets 320 150 - 350 K/uL    MPV 12.3 9.2 - 12.9 fL    Immature Granulocytes 0.5 0.0 - 0.5 %    Gran # (ANC) 5.2 1.8 - 7.7 K/uL    Immature Grans (Abs) 0.03 0.00 - 0.04 K/uL    Lymph # 0.2 (L) 1.0 - 4.8 K/uL    Mono # 0.0 (L) 0.3 - 1.0 K/uL    Eos # 0.0 0.0 - 0.5 K/uL    Baso # 0.01 0.00 - 0.20 K/uL    nRBC 0 0 /100 WBC    Gran % 94.6 (H) 38.0 - 73.0 %    Lymph % 4.2 (L) 18.0 - 48.0 %    Mono % 0.5 (L) 4.0 - 15.0 %    Eosinophil % 0.0 0.0 - 8.0 %    Basophil % 0.2 0.0 - 1.9 %    Differential Method Automated          Significant Imaging:     Imaging Results          X-Ray Chest AP Portable (Final result)  Result time 11/25/20 22:01:33    Final result by Domenico Rocha MD (11/25/20 22:01:33)                 Impression:      No acute intrathoracic process.    Changes of pulmonary emphysema.    Apparent 1.6 cm nodule in the left midlung zone.  A lateral projection may be obtained to confirm intrathoracic location.      Electronically signed by: Domenico Rocha MD  Date:    11/25/2020  Time:    22:01             Narrative:    EXAMINATION:  XR CHEST AP PORTABLE    CLINICAL HISTORY:  Shortness of breath    TECHNIQUE:  Single frontal view of the chest was performed.    COMPARISON:  10/19/2020.    FINDINGS:  Monitoring EKG leads are present.  The trachea is unremarkable.  There are calcifications of the aortic knob.  The cardiomediastinal silhouette is within normal limits.  There is no evidence of free air beneath the hemidiaphragms.  There are no  pleural effusions.  There is no evidence of a pneumothorax there is no evidence of pneumomediastinum.  There are chronic interstitial findings.  There are changes of pulmonary emphysema.  There is an apparent 1.6 cm nodule in the left midlung zone.  There are chronic right-sided rib deformities.  There are degenerative changes in the osseous structures.

## 2020-11-26 NOTE — PLAN OF CARE
11/26/20 1118   Discharge Assessment   Assessment Type Discharge Planning Assessment   Confirmed/corrected address and phone number on facesheet? Yes   Assessment information obtained from? Patient;Medical Record   Communicated expected length of stay with patient/caregiver no   Prior to hospitilization cognitive status: Alert/Oriented   Prior to hospitalization functional status: Assistive Equipment;Needs Assistance  (O2; nebulizer; transport chair; spouse and family assist)   Current cognitive status: Alert/Oriented   Current Functional Status: Assistive Equipment;Needs Assistance  (O2; nebulizer; transport chair; spouse and family assist at home)   Facility Arrived From: Home   Lives With spouse   Able to Return to Prior Arrangements yes   Is patient able to care for self after discharge? Yes   Who are your caregiver(s) and their phone number(s)? Hamzah-spouse: [phone]; [phone]   Patient's perception of discharge disposition home or selfcare   Readmission Within the Last 30 Days no previous admission in last 30 days   Patient currently being followed by outpatient case management? No   Patient currently receives any other outside agency services? No   Equipment Currently Used at Home oxygen;nebulizer;other (see comments)  (Transport chair)   Do you have any problems affording any of your prescribed medications? No   Is the patient taking medications as prescribed? yes   Does the patient have transportation home? Yes   Transportation Anticipated family or friend will provide   Does the patient receive services at the [facility]? No   Discharge Plan A Home with family   Discharge Plan B Other   DME Needed Upon Discharge  other (see comments)  (TBD)   Patient/Family in Agreement with Plan yes   SW Role explained to patient; two patient identifiers recognized; SW contact information placed on Communication board. Discussed patient managing health care at home; determined who would be helping patient at  home with recovery: Hamzah-spouse and family will help with recovery at home    PCP: Eric Gaines MD  No preference reported for appointment times    Extended Emergency Contact Information  Primary Emergency Contact: Hamzah Lucas  Address: [address] [place] [address] [place] [address] [facility]  Home Phone: [phone]  Mobile Phone: [phone]  Relation: Spouse     [id number] - [place] - [address] AT [facility] [address]  [address]  [place] [address]  Phone: [phone] Fax: [phone]     Payor: MEDICARE / Plan: MEDICARE PART A & B / Product Type: Government /

## 2020-11-26 NOTE — CONSULTS
[facility]  Pulmonology  Consult Note    Patient Name: Renée Lucas  MRN: [id number]  Admission Date: 11/25/2020  Hospital Length of Stay: 0 days  Code Status: Full Code  Attending Physician: Jhonatan Khan DO  Primary Care Provider: Eric Gaines MD   Principal Problem: COPD exacerbation    Inpatient consult to Pulmonology  Consult performed by: Renny Olivares MD  Consult ordered by: Jhonatan Khan DO        Subjective:     HPI:  Patient is 60 y.o. female  has a past medical history of Colon polyp (10/3/2013), COPD (chronic obstructive pulmonary disease) with emphysema, DDD (degenerative disc disease), lumbar, GERD (gastroesophageal reflux disease), Pulmonary nodules, and Vitamin B 12 deficiency. presented to [facility] on 11/26/20 with sob.   Patient reports her power went out last night and she could not use use portable compressor or her nebulizer for her breathing treatment. She was able to use her emergency oxygen tank but it didn't help much with her SOB. Patient reports her SOB recently gets worse despite she is compliant with all her breathing treatments at home including weekly home infusions for alpha-1 antitrypsin deficiency.   She is followed by Dr. Cox as an outpatient.  Patient denied fever/chill.  No coughing or wheezing.  Initial abg with acute on chronic hypercapnic respiratory failure.  PCO2>130.  Patient was placed on bpap with improvement in wob and PCO2.  No fever/chill.  No chest pain.  No coughing or wheezing.      Patient still smoke ~1 ppd.  Smoking since age 14 years.      Past Medical History:   Diagnosis Date    Colon polyp 10/3/2013    COPD (chronic obstructive pulmonary disease) with emphysema     DDD (degenerative disc disease), lumbar     GERD (gastroesophageal reflux disease)     Pulmonary nodules     Vitamin B 12 deficiency     bets B12 shots       Past Surgical History:   Procedure Laterality Date    BACK SURGERY      times 2 lumbar     HYSTERECTOMY      complete       Review of patient's allergies indicates:   Allergen Reactions    Hydrocodone Nausea And Vomiting and Other (See Comments)     Other reaction(s): upsets her stomach; severe abdominal cramping    Oxycodone Nausea And Vomiting     Other reaction(s): upsets stomach; severe abdominal cramps       Family History     Problem Relation (Age of Onset)    Breast cancer Sister    Cancer Mother, Sister    Heart disease Father, Brother    Ovarian cancer Mother        Tobacco Use    Smoking status: Current Every Day Smoker     Packs/day: 1.00     Years: 35.00     Pack years: 35.00     Types: Cigarettes     Start date: 5/22/1976    Smokeless tobacco: Current User   Substance and Sexual Activity    Alcohol use: Yes     Comment: occasional    Drug use: No    Sexual activity: Yes     Partners: Male     Birth control/protection: Surgical         Review of Systems   Constitutional: Negative for diaphoresis and fever.   HENT: Negative for congestion and sore throat.    Eyes: Negative for visual disturbance.   Respiratory: Positive for shortness of breath. Negative for cough and wheezing.    Cardiovascular: Negative for chest pain, palpitations and leg swelling.   Gastrointestinal: Negative for abdominal pain, blood in stool, diarrhea, nausea and vomiting.   Genitourinary: Negative for dysuria, flank pain and hematuria.   Musculoskeletal: Negative for arthralgias, myalgias, neck pain and neck stiffness.   Skin: Negative for wound.   Neurological: Negative for syncope, facial asymmetry and numbness.   Psychiatric/Behavioral: Negative for agitation and confusion.     Objective:     Vital Signs (Most Recent):  Temp: 98 °F (36.7 °C) (11/26/20 1055)  Pulse: 92 (11/26/20 1135)  Resp: 20 (11/26/20 1135)  BP: 139/66 (11/26/20 1055)  SpO2: 95 % (11/26/20 1135) Vital Signs (24h Range):  Temp:  [97.4 °F (36.3 °C)-98.1 °F (36.7 °C)] 98 °F (36.7 °C)  Pulse:  [] 92  Resp:  [16-26] 20  SpO2:  [90 %-99 %] 95  %  BP: ()/(52-82) 139/66     Weight: 35 kg (77 lb 2.6 oz)  Body mass index is 14.11 kg/m².    No intake or output data in the 24 hours ending 11/26/20 1149    Physical Exam  Constitutional:       Appearance: She is cachectic.   HENT:      Head: Normocephalic and atraumatic.      Nose: No congestion or rhinorrhea.   Eyes:      Pupils: Pupils are equal, round, and reactive to light.   Neck:      Musculoskeletal: Normal range of motion and neck supple.   Cardiovascular:      Rate and Rhythm: Regular rhythm. Tachycardia present.      Pulses: Normal pulses.      Heart sounds: Normal heart sounds.   Pulmonary:      Breath sounds: Decreased breath sounds and wheezing (faint) present. No rhonchi or rales.   Abdominal:      Tenderness: There is no abdominal tenderness. There is no right CVA tenderness, left CVA tenderness, guarding or rebound.   Musculoskeletal:         General: No swelling or tenderness.      Right lower leg: No edema.      Left lower leg: No edema.   Neurological:      Mental Status: She is alert and oriented to person, place, and time.   Psychiatric:         Mood and Affect: Mood normal.         Thought Content: Thought content normal.         Judgment: Judgment normal.         Vents:  Oxygen Concentration (%): 40 (11/26/20 1135)    Lines/Drains/Airways     Peripheral Intravenous Line                 Peripheral IV - Single Lumen 18 G Left Antecubital -- days                Significant Labs:    CBC/Anemia Profile:  Recent Labs   Lab 11/25/20 2140 11/26/20  0505   WBC 9.43 5.49   HGB 12.8 12.8   HCT 41.8 41.0    320   MCV 98 94   RDW 12.7 12.7        Chemistries:  Recent Labs   Lab 11/25/20 2140 11/26/20  0505   * 142   K 3.9 4.7   CL 92* 93*   CO2 45* 42*   BUN 13 19   CREATININE 0.6 0.6   CALCIUM 9.5 9.3   ALBUMIN 3.4*  --    PROT 6.5  --    BILITOT 0.3  --    ALKPHOS 74  --    ALT 9*  --    AST 11  --      BNP  Recent Labs   Lab 11/25/20 2140   BNP 19     Echo 5/22/15  CONCLUSIONS      1 - Normal left ventricular systolic function (EF 55-60%).     2 - Normal left ventricular diastolic function.     3 - Normal right ventricular systolic function .     4 - Intermediate central venous pressure.     5/22/15 ratio of 36%; fvc 68%; fev1 30%; tlc 102%; dlco 41%    ABGs:   Recent Labs   Lab 11/26/20  1121   PH 7.322*   PCO2 88.0*   HCO3 45.6*   POCSATURATED 97   BE 15       Significant Imaging:   CT: I have reviewed all pertinent results/findings within the past 24 hours and my personal findings are:  5/21/15 severe emphysematous changes.    CXR: I have reviewed all pertinent results/findings within the past 24 hours and my personal findings are:  hyperinflated.  no effusion or consolidation.  ?Left mid lungs nodule.      Assessment/Plan:     * COPD exacerbation  -fev1 30% in 2015.  Followed by Dr. Cox  -Precipitated by power outage.    -Feeling like she is back to baseline.  Will wean to po prednisone.  Continue with lama, laba/ics and daliresp.   -turn down for lung transplantation enlistment in the past due to smoking history.  May be reasonable to refer back to transplant if patient is willing to quit smoking.      Acute on chronic respiratory failure with hypoxia and hypercapnia  Compensated per most recent abg.  Will wean to bipap qhs and prn.      Tobacco abuse  Encourage smoking cessation.  Still smokes 1 ppd.  Not ready to quit.      Alpha-1-antitrypsin deficiency  Augmentation therapy at home.  Follow up with Dr. Cox.      Pulmonary nodules  ?left lung nodule.  Multiple subcentimeter nodule noted on ct in 2015.  Will repeat ct of chest.  Poor candidate for intervention.            Thank you for your consult. I will follow-up with patient. Please contact us if you have any additional questions.     Renny Olivares MD  Pulmonology  [facility]

## 2020-11-26 NOTE — ED NOTES
Pt A & O x 3, in no distress. Pt remains on bipap. Respirations are even and unlabored. Skin is warm, dry and pink. VS. Will conitnue to monitor closely.

## 2020-11-26 NOTE — ASSESSMENT & PLAN NOTE
Acute on chronic issue after not able to use her portable compressor or her nebulizer for her breathing treatment due to no power at home. Presented with acidosis pH 7.2 and pCO2 > 130 on admission. Improved with BIPAP    - Continue BiPAP  - Continue IV steroid  - Continue duo-nebs

## 2020-11-26 NOTE — ED NOTES
Pt awake and alert, states she feels better w/ NRB. NO respiratory distress at this time.. Respirations are even and unlabored. Skin is warm, dry and pink. VS. Will continue to monitor closely.

## 2020-11-26 NOTE — ED NOTES
Pt continues on bipap, is tolerating well. Pt is in no apparent distress. Respirations are even and unlabored. Skin is warm, dry and pink. VSS. Will continue to monitor closely.

## 2020-11-26 NOTE — ASSESSMENT & PLAN NOTE
Chest xray today reveals 1.6 cm nodule in the left midlung zone which is new finding. CT scan in 5/2015 showed pulmonary nodules on the right lobe.    - Can follow up outpatient Pulmonology for pulmonary nodules

## 2020-11-26 NOTE — PROGRESS NOTES
Taken patient off of BIPAP per Dr. Olivares's order.  Placed nasal cannula 3L on patient.  BIPAP machine on stand by and patient is in no distress.

## 2020-11-26 NOTE — PLAN OF CARE
Problem: Fall Injury Risk  Goal: Absence of Fall and Fall-Related Injury  Intervention: Identify and Manage Contributors to Fall Injury Risk  Flowsheets (Taken 11/26/2020 1751)  Self-Care Promotion: independence encouraged  Medication Review/Management: medications reviewed     Problem: Adult Inpatient Plan of Care  Goal: Plan of Care Review  Flowsheets (Taken 11/26/2020 1751)  Plan of Care Reviewed With:   patient   significant other     Problem: Skin Injury Risk Increased  Goal: Skin Health and Integrity  Intervention: Optimize Skin Protection  Flowsheets (Taken 11/26/2020 1751)  Pressure Reduction Techniques: frequent weight shift encouraged  Pressure Reduction Devices: positioning supports utilized  Skin Protection: adhesive use limited  Head of Bed (HOB): HOB elevated      Dx: STEMI, DKA

## 2020-11-26 NOTE — ASSESSMENT & PLAN NOTE
?left lung nodule.  Multiple subcentimeter nodule noted on ct in 2015.  Will repeat ct of chest.  Poor candidate for intervention.

## 2020-11-27 VITALS
TEMPERATURE: 98 F | SYSTOLIC BLOOD PRESSURE: 134 MMHG | OXYGEN SATURATION: 94 % | HEART RATE: 103 BPM | RESPIRATION RATE: 20 BRPM | HEIGHT: 62 IN | BODY MASS INDEX: 13.98 KG/M2 | WEIGHT: 76 LBS | DIASTOLIC BLOOD PRESSURE: 65 MMHG

## 2020-11-27 PROBLEM — R00.0 TACHYCARDIA: Status: ACTIVE | Noted: 2020-11-27

## 2020-11-27 LAB
ANION GAP SERPL CALC-SCNC: 10 MMOL/L (ref 8–16)
AORTIC ROOT ANNULUS: 2.65 CM
AORTIC VALVE CUSP SEPERATION: 1.8 CM
AV INDEX (PROSTH): 0.89
AV MEAN GRADIENT: 4 MMHG
AV PEAK GRADIENT: 5 MMHG
AV VALVE AREA: 2.64 CM2
AV VELOCITY RATIO: 0.83
BSA FOR ECHO PROCEDURE: 1.23 M2
BUN SERPL-MCNC: 21 MG/DL (ref 6–20)
CALCIUM SERPL-MCNC: 9.5 MG/DL (ref 8.7–10.5)
CHLORIDE SERPL-SCNC: 93 MMOL/L (ref 95–110)
CO2 SERPL-SCNC: 41 MMOL/L (ref 23–29)
CREAT SERPL-MCNC: 0.6 MG/DL (ref 0.5–1.4)
CV ECHO LV RWT: 0.58 CM
DOP CALC AO PEAK VEL: 1.13 M/S
DOP CALC AO VTI: 21.54 CM
DOP CALC LVOT AREA: 3 CM2
DOP CALC LVOT DIAMETER: 1.94 CM
DOP CALC LVOT PEAK VEL: 0.94 M/S
DOP CALC LVOT STROKE VOLUME: 56.93 CM3
DOP CALCLVOT PEAK VEL VTI: 19.27 CM
E WAVE DECELERATION TIME: 176.37 MSEC
E/A RATIO: 0.74
ECHO LV POSTERIOR WALL: 1.13 CM (ref 0.6–1.1)
EST. GFR  (AFRICAN AMERICAN): >60 ML/MIN/1.73 M^2
EST. GFR  (NON AFRICAN AMERICAN): >60 ML/MIN/1.73 M^2
FRACTIONAL SHORTENING: 25 % (ref 28–44)
GLUCOSE SERPL-MCNC: 115 MG/DL (ref 70–110)
INTERVENTRICULAR SEPTUM: 1.05 CM (ref 0.6–1.1)
IVRT: 117.65 MSEC
LA MAJOR: 2.95 CM
LA MINOR: 3.24 CM
LA WIDTH: 3.61 CM
LEFT ATRIUM SIZE: 2.23 CM
LEFT ATRIUM VOLUME INDEX: 16.7 ML/M2
LEFT ATRIUM VOLUME: 21.13 CM3
LEFT INTERNAL DIMENSION IN SYSTOLE: 2.95 CM (ref 2.1–4)
LEFT VENTRICLE DIASTOLIC VOLUME INDEX: 52.49 ML/M2
LEFT VENTRICLE DIASTOLIC VOLUME: 66.5 ML
LEFT VENTRICLE MASS INDEX: 110 G/M2
LEFT VENTRICLE SYSTOLIC VOLUME INDEX: 26.4 ML/M2
LEFT VENTRICLE SYSTOLIC VOLUME: 33.5 ML
LEFT VENTRICULAR INTERNAL DIMENSION IN DIASTOLE: 3.91 CM (ref 3.5–6)
LEFT VENTRICULAR MASS: 138.79 G
MV PEAK A VEL: 0.92 M/S
MV PEAK E VEL: 0.68 M/S
PISA TR MAX VEL: 2.4 M/S
POTASSIUM SERPL-SCNC: 4.5 MMOL/L (ref 3.5–5.1)
PULM VEIN S/D RATIO: 1.42
PV PEAK D VEL: 0.43 M/S
PV PEAK S VEL: 0.61 M/S
PV PEAK VELOCITY: 1.4 CM/S
RA MAJOR: 3.29 CM
RA PRESSURE: 3 MMHG
RA WIDTH: 2.44 CM
RIGHT VENTRICULAR END-DIASTOLIC DIMENSION: 2.45 CM
RV TISSUE DOPPLER FREE WALL SYSTOLIC VELOCITY 1 (APICAL 4 CHAMBER VIEW): 15.23 CM/S
SINUS: 2.7 CM
SODIUM SERPL-SCNC: 144 MMOL/L (ref 136–145)
STJ: 2.03 CM
TR MAX PG: 23 MMHG
TRICUSPID ANNULAR PLANE SYSTOLIC EXCURSION: 1.13 CM
TV REST PULMONARY ARTERY PRESSURE: 26 MMHG

## 2020-11-27 PROCEDURE — 63600175 PHARM REV CODE 636 W HCPCS: Performed by: NURSE PRACTITIONER

## 2020-11-27 PROCEDURE — 25000003 PHARM REV CODE 250: Performed by: PHYSICIAN ASSISTANT

## 2020-11-27 PROCEDURE — 80048 BASIC METABOLIC PNL TOTAL CA: CPT

## 2020-11-27 PROCEDURE — 94761 N-INVAS EAR/PLS OXIMETRY MLT: CPT

## 2020-11-27 PROCEDURE — 25000003 PHARM REV CODE 250: Performed by: NURSE PRACTITIONER

## 2020-11-27 PROCEDURE — 36415 COLL VENOUS BLD VENIPUNCTURE: CPT

## 2020-11-27 PROCEDURE — 25000242 PHARM REV CODE 250 ALT 637 W/ HCPCS: Performed by: PHYSICIAN ASSISTANT

## 2020-11-27 PROCEDURE — 25000003 PHARM REV CODE 250: Performed by: INTERNAL MEDICINE

## 2020-11-27 PROCEDURE — 27000221 HC OXYGEN, UP TO 24 HOURS

## 2020-11-27 PROCEDURE — S4991 NICOTINE PATCH NONLEGEND: HCPCS | Performed by: PHYSICIAN ASSISTANT

## 2020-11-27 PROCEDURE — 25000242 PHARM REV CODE 250 ALT 637 W/ HCPCS: Performed by: INTERNAL MEDICINE

## 2020-11-27 PROCEDURE — 94640 AIRWAY INHALATION TREATMENT: CPT

## 2020-11-27 PROCEDURE — 94660 CPAP INITIATION&MGMT: CPT

## 2020-11-27 PROCEDURE — 99900035 HC TECH TIME PER 15 MIN (STAT)

## 2020-11-27 RX ORDER — DM/P-EPHED/ACETAMINOPH/DOXYLAM 30-7.5/3
2 LIQUID (ML) ORAL
Qty: 144 LOZENGE | Refills: 1 | Status: SHIPPED | OUTPATIENT
Start: 2020-11-27

## 2020-11-27 RX ORDER — PREDNISONE 20 MG/1
40 TABLET ORAL DAILY
Qty: 8 TABLET | Refills: 0 | Status: SHIPPED | OUTPATIENT
Start: 2020-11-27 | End: 2020-12-01

## 2020-11-27 RX ORDER — DOXYCYCLINE HYCLATE 100 MG
100 TABLET ORAL EVERY 12 HOURS
Qty: 8 TABLET | Refills: 0 | Status: SHIPPED | OUTPATIENT
Start: 2020-11-27 | End: 2020-12-01

## 2020-11-27 RX ADMIN — METHYLPREDNISOLONE SODIUM SUCCINATE 80 MG: 40 INJECTION, POWDER, FOR SOLUTION INTRAMUSCULAR; INTRAVENOUS at 05:11

## 2020-11-27 RX ADMIN — GABAPENTIN 300 MG: 300 CAPSULE ORAL at 09:11

## 2020-11-27 RX ADMIN — TIOTROPIUM BROMIDE 18 MCG: 18 CAPSULE ORAL; RESPIRATORY (INHALATION) at 08:11

## 2020-11-27 RX ADMIN — DOCUSATE SODIUM 50 MG AND SENNOSIDES 8.6 MG 1 TABLET: 8.6; 5 TABLET, FILM COATED ORAL at 09:11

## 2020-11-27 RX ADMIN — PAROXETINE HYDROCHLORIDE 20 MG: 20 TABLET, FILM COATED ORAL at 09:11

## 2020-11-27 RX ADMIN — FLUTICASONE FUROATE AND VILANTEROL 1 PUFF: 100; 25 POWDER RESPIRATORY (INHALATION) at 08:11

## 2020-11-27 RX ADMIN — Medication 1 PATCH: at 09:11

## 2020-11-27 RX ADMIN — DOXYCYCLINE HYCLATE 100 MG: 100 TABLET, COATED ORAL at 09:11

## 2020-11-27 RX ADMIN — LEVALBUTEROL HYDROCHLORIDE 0.63 MG: 0.63 SOLUTION RESPIRATORY (INHALATION) at 08:11

## 2020-11-27 RX ADMIN — ROFLUMILAST 500 MCG: 500 TABLET ORAL at 09:11

## 2020-11-27 NOTE — PLAN OF CARE
WRITTEN HEALTHCARE DISCHARGE INFORMATION     Things that YOU are RESPONSIBLE for to Manage Your Care At Home:    1. Getting your prescriptions filled.  2. Taking you medications as directed. DO NOT MISS ANY DOSES!  3. Going to your follow-up doctor appointments. This is important because it allows the doctor to monitor your progress and to determine if any changes need to be made to your treatment plan.    If you are unable to make your follow up appointments, please call the number listed and reschedule this appointment.     ____________HELP AT HOME____________________    Experiencing any SIGNS or SYMPTOMS: YOU CAN:     Schedule a same day appopintment with your Primary Care Doctor or  you can call [facility] On Call Nurse Care Line for 24/7 assistance at [phone]     If you are experience any signs or symptoms that have become severe, Call 911 and come to your nearest Emergency Room.     You should receive a call from [facility] within 48-72 hours to help manage your care after discharge. Please try to make sure that you answer your phone for this important phone call.    Thank you for choosing [facility] and allowing us to care for you.   From your care management team: Lidia Polk [facility], [phone]       Follow-up Information     Eric Gaines MD. Go on 12/18/2020.    Specialties: Internal Medicine, Wound Care  Why: Primary Care Follow-Up: Friday, December 18, 2020 at 9:00am   Contact information:  [address] [address]  [phone]             Teri Horton DNP. Go on 12/10/2020.    Specialties: Pulmonary Disease, Critical Care Medicine  Why: Pulmonology Follow-Up: Thursday, December 10, 2020 at 11:00am   Contact information:  [address] [place] [address]  [phone]

## 2020-11-27 NOTE — NURSING
Reported off to oncoming nurse, patient resting in bed, aaox3. Patient can make needs known to staff, max assist required for adls and transfers, no acute distress noted, safety precautions maintained.      Chart check completed.

## 2020-11-27 NOTE — NURSING
Received report from RICKY Mei. Patient lying in bed resting, NAD noted. Safety Precautions maintained. Will Monitor.

## 2020-11-27 NOTE — DISCHARGE SUMMARY
[facility] Hospital Medicine  Discharge Summary      Patient Name: Renée Lucas  MRN: [id number]  Admission Date: 11/25/2020  Hospital Length of Stay: 1 days  Discharge Date and Time:  11/27/2020 3:44 PM  Attending Physician: Jhonatan Khan DO   Discharging Provider: Jhonatan Khan DO  Primary Care Provider: Eric Gaines MD      HPI:    Renée Lucas is a 61 y/o female with COPD with emphysema chronically on O2, pulmonary nodules, GERD, and degenerative disk disease of lumbar spine who presents to the hospital complaining of acute severe shortness of breath. Patient reports her power went out last night and she could not use use portable compressor or her nebulizer for her breathing treatment. She was able to use her emergency oxygen tank but it didn't help much with her SOB. Patient reports her SOB recently gets worse despite she is compliant with all her breathing treatments at home including weekly home infusions for alpha-1 antitrypsin deficiency. Her last infusion was yesterday Wednesday 11/25/20. Patient also complains of hearing loss bilaterally recently. She denies any fever, worsening cough, chest pain, abdominal pain, N/V/D, leg swelling, or other associated symptoms.  She was placed on 12L oxygen via non-rebreather with EMS and had some improvement in respiratory distress.     In ED, patient was placed in BIPAP due to severe elevation in pCO2 > 130 and acidosis with pH 7.2. Repeat ABG after BIPAP showed improvement in pCO2 to 102.    Patient is admitted to the hospital with Hospital Medicine for COPD exacerbation with significant hypercarbia.        * No surgery found *      Hospital Course:   No notes on file     Consults:   Consults (From admission, onward)        Status Ordering Provider     Inpatient consult to Pulmonology  Once     Provider:  Renny Olivares MD    Completed JHONATAN KHAN     Inpatient consult to Respiratory Care  Once     Provider:  (Not yet assigned)     Acknowledged AAYUSH JUAN          * COPD exacerbation  Presented with worsening SOB after not able to use her portable compressor or her nebulizer for her breathing treatment due to no power at home. Presented with acidosis pH 7.2 and pCO2 > 130 on admission. Improved to pCO2 68 with BIPAP (likely baseline)  Patient made quick recovery with duo-nebs, steroids, doxycycline   Will continue doxy, prednisone to total 5 day course  Cont  LABA/LAMA/ICS/daliresp  Follows with Dr. Negro as outpatient  Counseled her extensively on smoking cessation. Rx nicotine lozenges. Patient feels she has a psychological addiction and finds it very hard to stop. Recommended 'Isma Neumann's Easy Way to Stop Smoking' as an adjunct to treatment. Also recommend  to stop smoking and keep patient away from triggers such as the smell of smoke and cigarette cartons in the house. Discussed very poor prognosis - given very low weight and respiratory compromise. Per pulm - she may be candidate for transplant if she stops smoking.        Tachycardia  Sinus tachycardia from 130s-150s with any exertion to extremely poor respiratory status  TTE was normal      Acute on chronic respiratory failure with hypoxia and hypercapnia  As above      Tobacco abuse  See above.   Extensively counseled for > 15 minutes      Alpha-1-antitrypsin deficiency  On weekly infusion alpha-1 proteinase. Last infusion was yesterday Wednesday 11/25/20.      Pulmonary nodules  Chest xray today reveals 1.6 cm nodule in the left midlung zone which is new finding. CT scan in 5/2015 showed pulmonary nodules on the right lobe.  - Can follow up outpatient Pulmonology for pulmonary nodules      Chronic obstructive pulmonary disease  COPD with pulmonary emphysema with alpha-1 antitrypsin deficiency. On Daliresp, alpha -1 proteinase, symbicort, singulair, spiriva.        Final Active Diagnoses:    Diagnosis Date Noted POA    PRINCIPAL PROBLEM:  COPD exacerbation [J44.1]  11/26/2020 Yes    Tachycardia [R00.0] 11/27/2020 Unknown    Acute on chronic respiratory failure with hypoxia and hypercapnia [J96.21, J96.22] 11/26/2020 Yes    Alpha-1-antitrypsin deficiency [E88.01] 05/24/2015 Yes    Tobacco abuse [Z72.0] 05/24/2015 Yes    Chronic obstructive pulmonary disease [J44.9]  Yes    Pulmonary nodules [R91.8]  Yes      Problems Resolved During this Admission:       Discharged Condition: stable    Disposition: Home or Self Care    Follow Up:  Follow-up Information     Eric Gaines MD. Go on 12/18/2020.    Specialties: Internal Medicine, Wound Care  Why: Primary Care Follow-Up: Friday, December 18, 2020 at 9:00am   Contact information:  [address] [address]  [phone]             Teri Horton DNP. Go on 12/10/2020.    Specialties: Pulmonary Disease, Critical Care Medicine  Why: Pulmonology Follow-Up: Thursday, December 10, 2020 at 11:00am   Contact information:  [address]  [facility] [address]  [phone]                 Patient Instructions:   No discharge procedures on file.    Significant Diagnostic Studies: Labs: All labs within the past 24 hours have been reviewed    Pending Diagnostic Studies:     Procedure Component Value Units Date/Time    EKG 12-lead [[id number]] Collected: 11/26/20 1745    Order Status: Sent Lab Status: In process Updated: 11/27/20 0609    Narrative:      Test Reason : R00.0,    Vent. Rate : 137 BPM     Atrial Rate : 137 BPM     P-R Int : 116 ms          QRS Dur : 074 ms      QT Int : 292 ms       P-R-T Axes : 088 091 077 degrees     QTc Int : 440 ms    Sinus tachycardia  Biatrial enlargement  Rightward axis  Pulmonary disease pattern  Abnormal ECG  When compared with ECG of 26-NOV-2020 17:44,  No significant change was found    Referred By: [email or web address]   SELF           Confirmed By:          Medications:  Reconciled Home Medications:      Medication List      START taking these medications    doxycycline 100 MG tablet  Commonly  known as: VIBRA-TABS  Take 1 tablet (100 mg total) by mouth every 12 (twelve) hours. for 4 days     nicotine polacrilex 2 MG Lozg  Take 1 lozenge (2 mg total) by mouth as needed.     predniSONE 20 MG tablet  Commonly known as: DELTASONE  Take 2 tablets (40 mg total) by mouth once daily. for 4 days        CONTINUE taking these medications    alpha-1proteinase 1,000 mg Solr  Inject into the vein.     azelastine 137 mcg (0.1 %) nasal spray  Commonly known as: ASTELIN  SPRAY TWICE IN NASAL BID     DALIRESP 500 mcg Tab  Generic drug: roflumilast  Take 500 mcg by mouth once daily.     estradioL 1 MG tablet  Commonly known as: ESTRACE  TAKE 1 TABLET(1 MG) BY MOUTH EVERY DAY     fluticasone propionate 50 mcg/actuation nasal spray  Commonly known as: FLONASE  SPRAY TWICE NASAL DAILY     gabapentin 300 MG capsule  Commonly known as: NEURONTIN  TK 1 C PO TID     ketorolac 10 mg tablet  Commonly known as: TORADOL  Take 1 tablet (10 mg total) by mouth every 6 (six) hours.     lidocaine 5 %  Commonly known as: LIDODERM  Place 1 patch onto the skin once daily. Remove & Discard patch within 12 hours or as directed by MD     meloxicam 7.5 MG tablet  Commonly known as: MOBIC  Take 1 tablet (7.5 mg total) by mouth once daily.     mirtazapine 15 MG tablet  Commonly known as: REMERON  TAKE 1 TABLET(15 MG) BY MOUTH EVERY EVENING     montelukast 10 mg tablet  Commonly known as: SINGULAIR  TAKE 1 TABLET BY MOUTH EVERY EVENING     paroxetine 20 MG tablet  Commonly known as: PAXIL  Take 1 tablet (20 mg total) by mouth once daily.     * PROAIR HFA 90 mcg/actuation inhaler  Generic drug: albuterol  INHALE 2 PUFFS PO Q 4 TO 6 H PRF SOB / WHEEZING     * albuterol 90 mcg/actuation inhaler  Commonly known as: PROVENTIL/VENTOLIN HFA  INHALE 2 PUFFS PO Q 4 TO 6 H PRF SOB / WHEEZING     SPIRIVA RESPIMAT 2.5 mcg/actuation Mist  Generic drug: tiotropium bromide  INL 2 PUFFS PO D     SYMBICORT 160-4.5 mcg/actuation Hfaa  Generic drug:  budesonide-formoterol 160-4.5 mcg  INHALE 2 PUFFS BY MOUTH TWICE DAILY     traMADoL 50 mg tablet  Commonly known as: ULTRAM  TK 1 TO 2 TS PO Q 4 TO 6 H PRN P         * This list has 2 medication(s) that are the same as other medications prescribed for you. Read the directions carefully, and ask your doctor or other care provider to review them with you.                Indwelling Lines/Drains at time of discharge:   Lines/Drains/Airways     None                 Time spent on the discharge of patient: 30 minutes  Patient was seen and examined on the date of discharge and determined to be suitable for discharge.         Jhonatan Khan DO  Department of Hospital Medicine  [facility]

## 2020-11-27 NOTE — ASSESSMENT & PLAN NOTE
Presented with worsening SOB after not able to use her portable compressor or her nebulizer for her breathing treatment due to no power at home. Presented with acidosis pH 7.2 and pCO2 > 130 on admission. Improved to pCO2 68 with BIPAP (likely baseline)  Patient made quick recovery with duo-nebs, steroids, doxycycline   Will continue doxy, prednisone to total 5 day course  Cont  LABA/LAMA/ICS/daliresp  Follows with Dr. Negro as outpatient  Counseled her extensively on smoking cessation. Rx nicotine lozenges. Patient feels she has a psychological addiction and finds it very hard to stop. Recommended 'Isma Neumann's Easy Way to Stop Smoking' as an adjunct to treatment. Also recommend  to stop smoking and keep patient away from triggers such as the smell of smoke and cigarette cartons in the house. Discussed very poor prognosis - given very low weight and respiratory compromise. Per pulm - she may be candidate for transplant if she stops smoking.

## 2020-11-27 NOTE — ASSESSMENT & PLAN NOTE
Sinus tachycardia from 130s-150s with any exertion to extremely poor respiratory status  TTE was normal

## 2020-11-27 NOTE — ASSESSMENT & PLAN NOTE
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 On Daliresp, alpha -1 proteinase, symbicort, singulair, spiriva.

## 2020-11-27 NOTE — PLAN OF CARE
Problem: Fall Injury Risk  Goal: Absence of Fall and Fall-Related Injury  Outcome: Met     Problem: Adult Inpatient Plan of Care  Goal: Plan of Care Review  Outcome: Met  Goal: Patient-Specific Goal (Individualization)  Outcome: Met  Goal: Absence of Hospital-Acquired Illness or Injury  Outcome: Met  Goal: Optimal Comfort and Wellbeing  Outcome: Met  Goal: Readiness for Transition of Care  Outcome: Met  Goal: Rounds/Family Conference  Outcome: Met     Problem: Adult Inpatient Plan of Care  Goal: Patient-Specific Goal (Individualization)  Outcome: Met     Problem: Skin Injury Risk Increased  Goal: Skin Health and Integrity  Outcome: Met     Problem: Adult Inpatient Plan of Care  Goal: Rounds/Family Conference  Outcome: Met

## 2020-11-27 NOTE — PLAN OF CARE
EDUCATION:  Patient After Visit Summary (AVS) updated to include educational information on COPD that will be printed on patient's AVS to review upon discharge; Information reviewed with patient that include: signs and symptoms to look for and call the doctor if experiencing, and symptoms that may indicate a medical emergency: CALL 911.            SW taught patient about things she is responsible for when discharged to help with her recovery:  Particularly on how to Manage her care at home:  1. Getting his prescriptions filled.  2. Taking his medications as directed. DO NOT MISS ANY DOSES!  3. Going to his follow-up doctor appointments.       Help at home will be from pt's spouse, assisting in pt's recovery.     Follow-up appointments made and reviewed with patient; patient verbalized understanding     Nurse, Karyn, notified that all CM needs have been met via Secure Chat.         11/27/20 1321   Final Note   Assessment Type Final Discharge Note   Anticipated Discharge Disposition Home   What phone number can be called within the next 1-3 days to see how you are doing after discharge? [phone]   Hospital Follow Up  Appt(s) scheduled? Yes   Discharge plans and expectations educations in teach back method with documentation complete? Yes   Right Care Referral Info   Post Acute Recommendation No Care   Post-Acute Status   Discharge Delays None known at this time

## 2020-11-30 ENCOUNTER — PATIENT OUTREACH (OUTPATIENT)
Dept: ADMINISTRATIVE | Facility: CLINIC | Age: 60
End: 2020-11-30

## 2020-11-30 NOTE — PROGRESS NOTES
C3 nurse attempted to contact patient. No answer. The following message was left for the patient to return the call:  Good afternoon,  I am a nurse calling on behalf of [facility] from the Care Coordination Center.  This is a Transitional Care Call for Renée Lucas . When you have a moment please contact us at [phone] or [phone] Monday through Friday, between the hours of 8 am to 4 pm. We look forward to speaking with you. On behalf of [facility] have a nice day.    The patient has a scheduled HOSFU appointment with Eric Gaines MD  on 12/18 @ 900am. Message sent to Physician staff.

## 2020-12-10 ENCOUNTER — OFFICE VISIT (OUTPATIENT)
Dept: PULMONOLOGY | Facility: CLINIC | Age: 60
End: 2020-12-10
Payer: MEDICARE

## 2020-12-10 ENCOUNTER — TELEPHONE (OUTPATIENT)
Dept: TRANSPLANT | Facility: CLINIC | Age: 60
End: 2020-12-10

## 2020-12-10 VITALS
WEIGHT: 74.06 LBS | HEIGHT: 62 IN | SYSTOLIC BLOOD PRESSURE: 109 MMHG | OXYGEN SATURATION: 94 % | BODY MASS INDEX: 13.63 KG/M2 | DIASTOLIC BLOOD PRESSURE: 62 MMHG | HEART RATE: 126 BPM

## 2020-12-10 DIAGNOSIS — J43.1 PANLOBULAR EMPHYSEMA: ICD-10-CM

## 2020-12-10 DIAGNOSIS — J96.11 CHRONIC RESPIRATORY FAILURE WITH HYPOXIA AND HYPERCAPNIA: Primary | ICD-10-CM

## 2020-12-10 DIAGNOSIS — Z72.0 TOBACCO ABUSE: ICD-10-CM

## 2020-12-10 DIAGNOSIS — E88.01 ALPHA-1-ANTITRYPSIN DEFICIENCY: ICD-10-CM

## 2020-12-10 DIAGNOSIS — J96.11 CHRONIC RESPIRATORY FAILURE WITH HYPOXIA: ICD-10-CM

## 2020-12-10 DIAGNOSIS — Z76.82 ORGAN TRANSPLANT CANDIDATE: ICD-10-CM

## 2020-12-10 DIAGNOSIS — J96.12 CHRONIC RESPIRATORY FAILURE WITH HYPOXIA AND HYPERCAPNIA: Primary | ICD-10-CM

## 2020-12-10 DIAGNOSIS — F17.200 TOBACCO DEPENDENCE: ICD-10-CM

## 2020-12-10 PROCEDURE — 99215 OFFICE O/P EST HI 40 MIN: CPT | Mod: PBBFAC | Performed by: NURSE PRACTITIONER

## 2020-12-10 PROCEDURE — 99214 PR OFFICE/OUTPT VISIT, EST, LEVL IV, 30-39 MIN: ICD-10-PCS | Mod: S$PBB,,, | Performed by: NURSE PRACTITIONER

## 2020-12-10 PROCEDURE — 99999 PR PBB SHADOW E&M-EST. PATIENT-LVL V: ICD-10-PCS | Mod: PBBFAC,,, | Performed by: NURSE PRACTITIONER

## 2020-12-10 PROCEDURE — 99214 OFFICE O/P EST MOD 30 MIN: CPT | Mod: S$PBB,,, | Performed by: NURSE PRACTITIONER

## 2020-12-10 PROCEDURE — 99999 PR PBB SHADOW E&M-EST. PATIENT-LVL V: CPT | Mod: PBBFAC,,, | Performed by: NURSE PRACTITIONER

## 2020-12-10 RX ORDER — PREDNISONE 5 MG/1
10 TABLET ORAL
COMMUNITY
Start: 2020-11-02 | End: 2020-12-10

## 2020-12-10 RX ORDER — PREDNISONE 5 MG/1
TABLET ORAL
COMMUNITY
Start: 2020-12-09 | End: 2020-12-10 | Stop reason: SDUPTHER

## 2020-12-10 RX ORDER — PREDNISONE 5 MG/1
5 TABLET ORAL DAILY
Qty: 90 TABLET | Refills: 0 | Status: ON HOLD | OUTPATIENT
Start: 2020-12-10 | End: 2020-12-18 | Stop reason: SDUPTHER

## 2020-12-10 NOTE — TELEPHONE ENCOUNTER
Returned patient's call, reviewed with patient her current barriers to transplant, including weight, smoking status, and debility. Patient is aware she would have to gain a minimum of 15 pounds to attain BMI for consult, but then gain another 15 pounds to be candidate for surgery. Patient is aware she needs to quit smoking, and abstain from all tobacco containing products. Patient further verbalized understanding of need to strengthen. She reports she has participated in pulmonary rehab in the past. Advised she could return to this process or plan to exercise as tolerated at home, performing activities similar to what she has done in the past at pulmonary rehab. Patient verbalized understanding of all information and is aware she can call transplant coordinator to continue referral process when goals have been met.

## 2020-12-10 NOTE — LETTER
12/10/2020    Teri KRISTINE Sol  [address] [facility] [address]  Phone: [phone]  Fax: [phone]       Dear Teri Horton,     Patient:  Renée Lucas  MRN:  [id number]  :  1960    Thank you for referring Renée Lucas to our lung transplant program.  Upon reviewing the medical records provided to our office, we find that Renée Lucas is not a potential candidate for lung transplantation at [facility], as she does not meet the following criteria:    1. Patient's body mass index (BMI) is below the allowable amount, patient would need minimum BMI of minimum of 16 to be considered per lung transplant consultation policy. Patient would be required to have BMI of 20 for candidacy.    2. Patient is a current smoker. She was encouraged by Dr. Shabazz to discontinue smoking in , and her primary pulmonologist (Dr. Shannon Cox) as recently as 2020,     3. Severely limited functional status. Patient has refused referral to pulmonary rehab, as recently as 2020. No records received of patient's ability to tolerate a six minute walk test to further evaluate the differential.    Renée Lucas has the option of being referred by your office for lung transplant evaluation at another center. We have attempted to reach the patient to discuss these issues, for future consideration.    Once again, we appreciate your referral to our center.  We look forward to working with you in the future.  If you have any questions or concerns regarding this decision, then please do not hesitate to contact me at [phone].    Sincerely,         David Weill, MD   Director, Lung Transplantation   Pulmonary & Critical Care Medicine    [facility]  [facility] [place] [address]  [phone]    ADRIANA/jessica

## 2020-12-10 NOTE — TELEPHONE ENCOUNTER
Per Dr. Weill, patient is not a candidate for lung transplant consultation at this time. Letter to referring provider sent, attempted to reach patient to discuss current barriers to transplant.   Spoke with patient's , he was not with wife; he provided wife's number. Left voicemail on her phone requesting return call to discuss.

## 2020-12-10 NOTE — LETTER
December 10, 2020    Teri Horton  [address] [facility] [address]  Phone: [phone]  Fax: [phone]             Dear Teri HUANLeroy Salvadormaida:    Patient: Renée Lucas   MR Number: [id number]   YOB: 1960     Thank you for the referral of Renée Lucas to our lung transplant program. Your patients demographic and   clinical information have been submitted for transplant provider review and financial clearance. Once the provider and insurance company has approved the consult for your patient, she will be contacted by our office re: the date for an appointment. We will update you once this initial appointment has been scheduled. You will receive an after-visit summary following the completion of your patients appointment in our clinic.    Thank you again for your trust in our program. If there is anything we can do for you or your staff, please feel free to contact us at [phone].    Sincerely,         David Weill, MD  Medical Director, Lung Transplant  Pulmonary & Critical Care Medicine    [facility]  [address] [place] [id number]  [phone]

## 2020-12-10 NOTE — PROGRESS NOTES
Subjective:       Patient ID: Renée Lucas is a 60 y.o. female.    Chief Complaint: COPD, respiratory failure  HPI:   Renée Lucas is a 60 y.o. female who presents with evaluation for COPD, chronic respiratory failure with hypoxia.  She has been followed at [facility] by Dr. Cox. She si here for a second opinion.  She is accompanied by her  in clinic today who helps with the ROS/HPI.  Hospitalized with a COPD exacerbation at [facility].  Continues to smokes about a pack a day.  Did get some nicotine lozenges and is interested in trying to quit.   She was on BiPAP to help with hypercapnia while inpatient.  She is very weak and getting around is difficult.  It takes a lot of time to recover with just a short trip to the bathroom. She has just finished steroids and is on daliresp as well.  She has daytime somnolence.  She typically uses 2-4 L per NC.  Uses it round the clock.  Has a PMH of alpha-1 antitripsin deficiency, on an alpha tripsin infusion, has been on for 3 years.   Uses rescue inhaler 3-4 times daily with good relief  Has been on oxygen for a while for PRN use.  Since 2017 she has slowly needed to use it round the clock.   They are interested in pursuing lung transplant.  She had been referred in the past and her LETICIA index was not in a favorable range.    Review of Systems   Constitutional: Positive for weight loss, activity change and appetite change. Negative for chills, fatigue and night sweats.   HENT: Negative for postnasal drip, rhinorrhea, trouble swallowing and congestion.    Eyes: Negative for itching.   Respiratory: Positive for dyspnea on extertion. Negative for cough, hemoptysis, sputum production, choking, chest tightness, shortness of breath, wheezing and use of rescue inhaler.    Cardiovascular: Negative for chest pain and palpitations.   Genitourinary: Negative for difficulty urinating.   Endocrine: Negative for cold intolerance and heat intolerance.    Musculoskeletal: Negative for  arthralgias.   Skin: Negative for rash.   Gastrointestinal: Negative for nausea, vomiting and acid reflux.   Neurological: Negative for dizziness and light-headedness.   Hematological: Negative for adenopathy.   Psychiatric/Behavioral: Negative for sleep disturbance.         Social History     Tobacco Use    Smoking status: Current Every Day Smoker     Packs/day: 1.00     Years: 35.00     Pack years: 35.00     Types: Cigarettes     Start date: 5/22/1976    Smokeless tobacco: Current User   Substance Use Topics    Alcohol use: Yes     Comment: occasional       Review of patient's allergies indicates:   Allergen Reactions    Hydrocodone Nausea And Vomiting and Other (See Comments)     Other reaction(s): upsets her stomach; severe abdominal cramping    Oxycodone Nausea And Vomiting     Other reaction(s): upsets stomach; severe abdominal cramps     Past Medical History:   Diagnosis Date    Colon polyp 10/3/2013    COPD (chronic obstructive pulmonary disease) with emphysema     DDD (degenerative disc disease), lumbar     GERD (gastroesophageal reflux disease)     Pulmonary nodules     Vitamin B 12 deficiency     bets B12 shots     Past Surgical History:   Procedure Laterality Date    BACK SURGERY      times 2 lumbar    HYSTERECTOMY      complete     Current Outpatient Medications on File Prior to Visit   Medication Sig    albuterol (PROVENTIL/VENTOLIN HFA) 90 mcg/actuation inhaler INHALE 2 PUFFS PO Q 4 TO 6 H PRF SOB / WHEEZING    alpha-1proteinase 1,000 mg SolR Inject into the vein.    azelastine (ASTELIN) 137 mcg (0.1 %) nasal spray SPRAY TWICE IN NASAL BID    budesonide-formoterol 160-4.5 mcg (SYMBICORT) 160-4.5 mcg/actuation HFAA INHALE 2 PUFFS BY MOUTH TWICE DAILY    DALIRESP 500 mcg Tab Take 500 mcg by mouth once daily.     estradioL (ESTRACE) 1 MG tablet TAKE 1 TABLET(1 MG) BY MOUTH EVERY DAY    fluticasone (FLONASE) 50 mcg/actuation nasal spray SPRAY TWICE NASAL DAILY    gabapentin  (NEURONTIN) 300 MG capsule TK 1 C PO TID    ketorolac (TORADOL) 10 mg tablet Take 1 tablet (10 mg total) by mouth every 6 (six) hours.    lidocaine (LIDODERM) 5 % Place 1 patch onto the skin once daily. Remove & Discard patch within 12 hours or as directed by MD (Patient not taking: Reported on 12/1/2020)    meloxicam (MOBIC) 7.5 MG tablet Take 1 tablet (7.5 mg total) by mouth once daily.    mirtazapine (REMERON) 15 MG tablet TAKE 1 TABLET(15 MG) BY MOUTH EVERY EVENING    montelukast (SINGULAIR) 10 mg tablet TAKE 1 TABLET BY MOUTH EVERY EVENING    nicotine polacrilex 2 MG Lozg Take 1 lozenge (2 mg total) by mouth as needed.    paroxetine (PAXIL) 20 MG tablet Take 1 tablet (20 mg total) by mouth once daily.    PROAIR HFA 90 mcg/actuation inhaler INHALE 2 PUFFS PO Q 4 TO 6 H PRF SOB / WHEEZING    SPIRIVA RESPIMAT 2.5 mcg/actuation Mist INL 2 PUFFS PO D    tramadol (ULTRAM) 50 mg tablet TK 1 TO 2 TS PO Q 4 TO 6 H PRN P     No current facility-administered medications on file prior to visit.        Objective:      Vitals:    12/10/20 1103   BP: 109/62   Pulse: (!) 126     Physical Exam   Constitutional: She is oriented to person, place, and time. She appears well-developed and well-nourished. She appears cachectic. No distress.   Frail, on oxygen via nasal cannula, in wheelchair  Ill apprearing   HENT:   Head: Normocephalic.   Neck: Normal range of motion. Neck supple.   Cardiovascular: Normal rate and regular rhythm.   No murmur heard.  Pulmonary/Chest: Normal expansion, symmetric chest wall expansion and effort normal. No respiratory distress. She has decreased breath sounds. She has wheezes. She has no rhonchi. She has no rales.   Abdominal: Soft. She exhibits no distension. There is no hepatosplenomegaly. There is no abdominal tenderness.   Musculoskeletal: Normal range of motion.         General: No edema.   Lymphadenopathy:     She has no cervical adenopathy.   Neurological: She is alert and oriented to  person, place, and time. Gait normal.   Skin: Skin is warm and dry. No cyanosis. Nails show no clubbing.   Psychiatric: She has a normal mood and affect.   Vitals reviewed.    Personal Diagnostic Review    Pulmonary Function Tests 12/10/2020   SpO2 94   Height 62   Weight 1185.19   BMI (Calculated) 13.5   Some recent data might be hidden         Assessment:     Problem List Items Addressed This Visit        Pulmonary    Chronic obstructive pulmonary disease    Overview     On triple therapy (ICS/LABA/LAMA)  Daily use of PRN NOEL  No PFTs available- repeat spirometry would not change plan of care- last measured FEV1 30% of predicted in 2015, with FEV1/FVC ratio of 36% of predicted.  Rather impaired DLCO at that time. Suspect it has not improved as she continues to smoke and has been feeling progressively worse over the last 5 years.  Continue daily prednisone for now as she has been unable to wean off. Continue Daliresp. Do recommend that they meet with palliative care in the interim to establish care with their service.   Ok to try PRN ipratropium         Current Assessment & Plan     Continue as now.          Chronic respiratory failure with hypoxia    Overview     On 2-4 L per NC depending on activity.  Continue as now         Current Assessment & Plan     Monitor.         Alpha-1-antitrypsin deficiency    Overview     Weekly infusion therapy per Dr. Cox         Current Assessment & Plan     Continue as now.             Other    Organ transplant candidate    Overview     Has been evaluated by lung transplant team in the past.  She was deemed at that time to be a candidate for alpha trypsin infusion, which she subsequently began.  She also completed pulmonary rehab per Dr. Shabazz's recommendation.  She did not quit smoking unfortunately which may be (rivaled by her severe cachexia) our greatest bobbi to overcome.    LETICIA Index in clinic today estimated to be 8, based on data that is about 5 years old as patient  is unable to repeat walk or PFTs due to frailty.  She is amiable to smoking cessation if it would increase her chances for improved outcomes with lung transplant (and improve her chance of being an optimal candidate).    Refer to lung transplant for their expertise.          Current Assessment & Plan     Monitor.         Tobacco abuse    Overview     It is imperative that she quit.  Long discussion regarding need for complete cessation.            Other Visit Diagnoses     Chronic respiratory failure with hypoxia and hypercapnia    -  Primary    Relevant Orders    Ambulatory referral/consult to Transplant, Lung    Tobacco dependence        Relevant Orders    Ambulatory referral/consult to Smoking Cessation Program

## 2020-12-10 NOTE — TELEPHONE ENCOUNTER
----- Message from Derick Vu sent at 12/10/2020  3:18 PM CST -----              Pt returning missed call                  [phone] (M)

## 2020-12-10 NOTE — PATIENT INSTRUCTIONS
I am referring you to Lung Transplant for discussion about your candidacy for lung transplant    Keep using your Symbicort 2 puffs twice daily, Spiriva  2 puffs daily as you have been    Ok to try Atrovent 2 puffs every 6 hours     I will see if we can get you a virtual visit with smoking cessation.

## 2020-12-10 NOTE — TELEPHONE ENCOUNTER
Referral received from Teri Horton NP, records reviewed. Note patient referred in 2015 . Note per Dr. Shannon Cox 9/10/20, patient informed she is not a transplant candidate due to immobility, due to current smoking status, and refusal to participate in pulmonary rehab. Note patient BMI 13.55. This is outside of acceptance criteria for transplant as well. Routed information to Dr. Weill for review, and authorization to proceed with lung transplant consult visit and financial clearance.    Note patient referred, had initial consult 2015, did not follow up in six months as requested. Patient had been encouraged to follow up with pulmonary rehab and smoking cessation at that time.    Family

## 2020-12-16 ENCOUNTER — HOSPITAL ENCOUNTER (INPATIENT)
Facility: HOSPITAL | Age: 60
LOS: 2 days | Discharge: HOME | DRG: 189 | End: 2020-12-18
Attending: EMERGENCY MEDICINE | Admitting: HOSPITALIST
Payer: MEDICARE

## 2020-12-16 DIAGNOSIS — Z51.5 PALLIATIVE CARE ENCOUNTER: ICD-10-CM

## 2020-12-16 DIAGNOSIS — J44.1 COPD EXACERBATION: ICD-10-CM

## 2020-12-16 DIAGNOSIS — J96.22 ACUTE ON CHRONIC RESPIRATORY FAILURE WITH HYPOXIA AND HYPERCAPNIA: Primary | ICD-10-CM

## 2020-12-16 DIAGNOSIS — J96.92 RESPIRATORY FAILURE WITH HYPOXIA AND HYPERCAPNIA, UNSPECIFIED CHRONICITY: ICD-10-CM

## 2020-12-16 DIAGNOSIS — R06.02 SOB (SHORTNESS OF BREATH): ICD-10-CM

## 2020-12-16 DIAGNOSIS — Z71.89 ADVANCE CARE PLANNING: ICD-10-CM

## 2020-12-16 DIAGNOSIS — J96.21 ACUTE ON CHRONIC RESPIRATORY FAILURE WITH HYPOXIA AND HYPERCAPNIA: Primary | ICD-10-CM

## 2020-12-16 DIAGNOSIS — J96.91 RESPIRATORY FAILURE WITH HYPOXIA AND HYPERCAPNIA, UNSPECIFIED CHRONICITY: ICD-10-CM

## 2020-12-16 LAB
ALBUMIN SERPL BCP-MCNC: 3.3 G/DL (ref 3.5–5.2)
ALLENS TEST: ABNORMAL
ALP SERPL-CCNC: 75 U/L (ref 55–135)
ALT SERPL W/O P-5'-P-CCNC: 10 U/L (ref 10–44)
ANION GAP SERPL CALC-SCNC: 8 MMOL/L (ref 8–16)
AST SERPL-CCNC: 14 U/L (ref 10–40)
BASOPHILS # BLD AUTO: 0.04 K/UL (ref 0–0.2)
BASOPHILS NFR BLD: 0.4 % (ref 0–1.9)
BILIRUB SERPL-MCNC: 0.2 MG/DL (ref 0.1–1)
BNP SERPL-MCNC: 27 PG/ML (ref 0–99)
BUN SERPL-MCNC: 14 MG/DL (ref 6–20)
CALCIUM SERPL-MCNC: 9.9 MG/DL (ref 8.7–10.5)
CHLORIDE SERPL-SCNC: 96 MMOL/L (ref 95–110)
CO2 SERPL-SCNC: 41 MMOL/L (ref 23–29)
CREAT SERPL-MCNC: 0.6 MG/DL (ref 0.5–1.4)
CTP QC/QA: YES
CTP QC/QA: YES
DELSYS: ABNORMAL
DIFFERENTIAL METHOD: ABNORMAL
EOSINOPHIL # BLD AUTO: 0.3 K/UL (ref 0–0.5)
EOSINOPHIL NFR BLD: 3.4 % (ref 0–8)
EP: 5
EP: 8
ERYTHROCYTE [DISTWIDTH] IN BLOOD BY AUTOMATED COUNT: 12.8 % (ref 11.5–14.5)
ERYTHROCYTE [SEDIMENTATION RATE] IN BLOOD BY WESTERGREN METHOD: 10 MM/H
ERYTHROCYTE [SEDIMENTATION RATE] IN BLOOD BY WESTERGREN METHOD: 10 MM/H
EST. GFR  (AFRICAN AMERICAN): >60 ML/MIN/1.73 M^2
EST. GFR  (NON AFRICAN AMERICAN): >60 ML/MIN/1.73 M^2
FIO2: 35
FIO2: 35
FLOW: 3
GLUCOSE SERPL-MCNC: 138 MG/DL (ref 70–110)
HCO3 UR-SCNC: 46.9 MMOL/L (ref 24–28)
HCO3 UR-SCNC: 49.8 MMOL/L (ref 24–28)
HCO3 UR-SCNC: 50 MMOL/L (ref 24–28)
HCT VFR BLD AUTO: 42.2 % (ref 37–48.5)
HGB BLD-MCNC: 12.8 G/DL (ref 12–16)
IMM GRANULOCYTES # BLD AUTO: 0.04 K/UL (ref 0–0.04)
IMM GRANULOCYTES NFR BLD AUTO: 0.4 % (ref 0–0.5)
IP: 10
IP: 15
LYMPHOCYTES # BLD AUTO: 0.9 K/UL (ref 1–4.8)
LYMPHOCYTES NFR BLD: 9.8 % (ref 18–48)
MAGNESIUM SERPL-MCNC: 1.9 MG/DL (ref 1.6–2.6)
MCH RBC QN AUTO: 29.9 PG (ref 27–31)
MCHC RBC AUTO-ENTMCNC: 30.3 G/DL (ref 32–36)
MCV RBC AUTO: 99 FL (ref 82–98)
MIN VOL: 7.5
MIN VOL: 7.6
MODE: ABNORMAL
MONOCYTES # BLD AUTO: 0.3 K/UL (ref 0.3–1)
MONOCYTES NFR BLD: 3.5 % (ref 4–15)
NEUTROPHILS # BLD AUTO: 8 K/UL (ref 1.8–7.7)
NEUTROPHILS NFR BLD: 82.5 % (ref 38–73)
NRBC BLD-RTO: 0 /100 WBC
PCO2 BLDA: 81.4 MMHG (ref 35–45)
PCO2 BLDA: 91.3 MMHG (ref 35–45)
PCO2 BLDA: 93.8 MMHG (ref 35–45)
PH SMN: 7.33 [PH] (ref 7.35–7.45)
PH SMN: 7.35 [PH] (ref 7.35–7.45)
PH SMN: 7.37 [PH] (ref 7.35–7.45)
PLATELET # BLD AUTO: 246 K/UL (ref 150–350)
PMV BLD AUTO: 11.1 FL (ref 9.2–12.9)
PO2 BLDA: 65 MMHG (ref 80–100)
PO2 BLDA: 71 MMHG (ref 40–60)
PO2 BLDA: 81 MMHG (ref 80–100)
POC BE: 18 MMOL/L
POC BE: 19 MMOL/L
POC BE: 20 MMOL/L
POC MOLECULAR INFLUENZA A AGN: NEGATIVE
POC MOLECULAR INFLUENZA B AGN: NEGATIVE
POC SATURATED O2: 89 % (ref 95–100)
POC SATURATED O2: 92 % (ref 95–100)
POC SATURATED O2: 94 % (ref 95–100)
POC TCO2: 49 MMOL/L (ref 24–29)
POC TCO2: >50 MMOL/L (ref 23–27)
POC TCO2: >50 MMOL/L (ref 23–27)
POTASSIUM SERPL-SCNC: 4.3 MMOL/L (ref 3.5–5.1)
PROT SERPL-MCNC: 6.4 G/DL (ref 6–8.4)
RBC # BLD AUTO: 4.28 M/UL (ref 4–5.4)
SAMPLE: ABNORMAL
SARS-COV-2 RDRP RESP QL NAA+PROBE: NEGATIVE
SITE: ABNORMAL
SODIUM SERPL-SCNC: 145 MMOL/L (ref 136–145)
SP02: 95
SP02: 96
SP02: 97
SPONT RATE: 15
SPONT RATE: 30
TROPONIN I SERPL DL<=0.01 NG/ML-MCNC: <0.006 NG/ML (ref 0–0.03)
WBC # BLD AUTO: 9.64 K/UL (ref 3.9–12.7)

## 2020-12-16 PROCEDURE — 63600175 PHARM REV CODE 636 W HCPCS: Performed by: EMERGENCY MEDICINE

## 2020-12-16 PROCEDURE — 63600175 PHARM REV CODE 636 W HCPCS: Performed by: HOSPITALIST

## 2020-12-16 PROCEDURE — 94640 AIRWAY INHALATION TREATMENT: CPT

## 2020-12-16 PROCEDURE — 94660 CPAP INITIATION&MGMT: CPT

## 2020-12-16 PROCEDURE — 94761 N-INVAS EAR/PLS OXIMETRY MLT: CPT

## 2020-12-16 PROCEDURE — 25000003 PHARM REV CODE 250: Performed by: HOSPITALIST

## 2020-12-16 PROCEDURE — 85025 COMPLETE CBC W/AUTO DIFF WBC: CPT

## 2020-12-16 PROCEDURE — 80053 COMPREHEN METABOLIC PANEL: CPT

## 2020-12-16 PROCEDURE — 93010 EKG 12-LEAD: ICD-10-PCS | Mod: ,,, | Performed by: INTERNAL MEDICINE

## 2020-12-16 PROCEDURE — 93010 ELECTROCARDIOGRAM REPORT: CPT | Mod: ,,, | Performed by: INTERNAL MEDICINE

## 2020-12-16 PROCEDURE — 25000003 PHARM REV CODE 250: Performed by: EMERGENCY MEDICINE

## 2020-12-16 PROCEDURE — U0002 COVID-19 LAB TEST NON-CDC: HCPCS | Performed by: EMERGENCY MEDICINE

## 2020-12-16 PROCEDURE — 83880 ASSAY OF NATRIURETIC PEPTIDE: CPT

## 2020-12-16 PROCEDURE — 83735 ASSAY OF MAGNESIUM: CPT

## 2020-12-16 PROCEDURE — 99285 EMERGENCY DEPT VISIT HI MDM: CPT | Mod: 25

## 2020-12-16 PROCEDURE — 82803 BLOOD GASES ANY COMBINATION: CPT

## 2020-12-16 PROCEDURE — 25000242 PHARM REV CODE 250 ALT 637 W/ HCPCS: Performed by: HOSPITALIST

## 2020-12-16 PROCEDURE — 84484 ASSAY OF TROPONIN QUANT: CPT

## 2020-12-16 PROCEDURE — 25000242 PHARM REV CODE 250 ALT 637 W/ HCPCS: Performed by: EMERGENCY MEDICINE

## 2020-12-16 PROCEDURE — 36600 WITHDRAWAL OF ARTERIAL BLOOD: CPT

## 2020-12-16 PROCEDURE — 27000190 HC CPAP FULL FACE MASK W/VALVE

## 2020-12-16 PROCEDURE — 99900035 HC TECH TIME PER 15 MIN (STAT)

## 2020-12-16 PROCEDURE — 93005 ELECTROCARDIOGRAM TRACING: CPT

## 2020-12-16 PROCEDURE — 20000000 HC ICU ROOM

## 2020-12-16 RX ORDER — GABAPENTIN 300 MG/1
300 CAPSULE ORAL EVERY 8 HOURS PRN
Status: DISCONTINUED | OUTPATIENT
Start: 2020-12-16 | End: 2020-12-18 | Stop reason: HOSPADM

## 2020-12-16 RX ORDER — PAROXETINE 10 MG/5ML
10 SUSPENSION ORAL DAILY
Status: DISCONTINUED | OUTPATIENT
Start: 2020-12-16 | End: 2020-12-18 | Stop reason: HOSPADM

## 2020-12-16 RX ORDER — IPRATROPIUM BROMIDE AND ALBUTEROL SULFATE 2.5; .5 MG/3ML; MG/3ML
3 SOLUTION RESPIRATORY (INHALATION)
Status: COMPLETED | OUTPATIENT
Start: 2020-12-16 | End: 2020-12-16

## 2020-12-16 RX ORDER — MONTELUKAST SODIUM 10 MG/1
10 TABLET ORAL DAILY
Status: DISCONTINUED | OUTPATIENT
Start: 2020-12-16 | End: 2020-12-18 | Stop reason: HOSPADM

## 2020-12-16 RX ORDER — TRAMADOL HYDROCHLORIDE 50 MG/1
50 TABLET ORAL EVERY 6 HOURS PRN
Status: DISCONTINUED | OUTPATIENT
Start: 2020-12-16 | End: 2020-12-18 | Stop reason: HOSPADM

## 2020-12-16 RX ORDER — DOXYCYCLINE HYCLATE 100 MG
100 TABLET ORAL EVERY 12 HOURS
Status: DISCONTINUED | OUTPATIENT
Start: 2020-12-16 | End: 2020-12-18 | Stop reason: HOSPADM

## 2020-12-16 RX ORDER — METHYLPREDNISOLONE SOD SUCC 125 MG
80 VIAL (EA) INJECTION
Status: COMPLETED | OUTPATIENT
Start: 2020-12-16 | End: 2020-12-16

## 2020-12-16 RX ORDER — FAMOTIDINE 20 MG/1
20 TABLET, FILM COATED ORAL 2 TIMES DAILY
Status: DISCONTINUED | OUTPATIENT
Start: 2020-12-16 | End: 2020-12-18 | Stop reason: HOSPADM

## 2020-12-16 RX ORDER — GABAPENTIN 300 MG/1
300 CAPSULE ORAL EVERY 8 HOURS PRN
Status: DISCONTINUED | OUTPATIENT
Start: 2020-12-16 | End: 2020-12-16

## 2020-12-16 RX ORDER — IPRATROPIUM BROMIDE AND ALBUTEROL SULFATE 2.5; .5 MG/3ML; MG/3ML
3 SOLUTION RESPIRATORY (INHALATION)
Status: DISCONTINUED | OUTPATIENT
Start: 2020-12-16 | End: 2020-12-16

## 2020-12-16 RX ORDER — MIRTAZAPINE 15 MG/1
15 TABLET, FILM COATED ORAL NIGHTLY
Status: DISCONTINUED | OUTPATIENT
Start: 2020-12-16 | End: 2020-12-18 | Stop reason: HOSPADM

## 2020-12-16 RX ORDER — LEVALBUTEROL 1.25 MG/.5ML
1.25 SOLUTION, CONCENTRATE RESPIRATORY (INHALATION) EVERY 8 HOURS
Status: DISCONTINUED | OUTPATIENT
Start: 2020-12-16 | End: 2020-12-18 | Stop reason: HOSPADM

## 2020-12-16 RX ORDER — ENOXAPARIN SODIUM 100 MG/ML
40 INJECTION SUBCUTANEOUS EVERY 24 HOURS
Status: DISCONTINUED | OUTPATIENT
Start: 2020-12-16 | End: 2020-12-18 | Stop reason: HOSPADM

## 2020-12-16 RX ORDER — GABAPENTIN 300 MG/1
300 CAPSULE ORAL 3 TIMES DAILY
Status: DISCONTINUED | OUTPATIENT
Start: 2020-12-16 | End: 2020-12-16

## 2020-12-16 RX ORDER — PREDNISONE 20 MG/1
20 TABLET ORAL
Status: COMPLETED | OUTPATIENT
Start: 2020-12-16 | End: 2020-12-16

## 2020-12-16 RX ADMIN — DOXYCYCLINE HYCLATE 100 MG: 100 TABLET, COATED ORAL at 09:12

## 2020-12-16 RX ADMIN — PAROXETINE HYDROCHLORIDE 10 MG: 10 SUSPENSION ORAL at 11:12

## 2020-12-16 RX ADMIN — METHYLPREDNISOLONE SODIUM SUCCINATE 80 MG: 40 INJECTION, POWDER, FOR SOLUTION INTRAMUSCULAR; INTRAVENOUS at 11:12

## 2020-12-16 RX ADMIN — FAMOTIDINE 20 MG: 20 TABLET, FILM COATED ORAL at 11:12

## 2020-12-16 RX ADMIN — MONTELUKAST 10 MG: 10 TABLET, FILM COATED ORAL at 11:12

## 2020-12-16 RX ADMIN — METHYLPREDNISOLONE SODIUM SUCCINATE 80 MG: 40 INJECTION, POWDER, FOR SOLUTION INTRAMUSCULAR; INTRAVENOUS at 09:12

## 2020-12-16 RX ADMIN — AZITHROMYCIN 500 MG: 500 INJECTION, POWDER, LYOPHILIZED, FOR SOLUTION INTRAVENOUS at 08:12

## 2020-12-16 RX ADMIN — IPRATROPIUM BROMIDE AND ALBUTEROL SULFATE 3 ML: .5; 3 SOLUTION RESPIRATORY (INHALATION) at 05:12

## 2020-12-16 RX ADMIN — LEVALBUTEROL 1.25 MG: 1.25 SOLUTION, CONCENTRATE RESPIRATORY (INHALATION) at 11:12

## 2020-12-16 RX ADMIN — METHYLPREDNISOLONE SODIUM SUCCINATE 80 MG: 125 INJECTION, POWDER, FOR SOLUTION INTRAMUSCULAR; INTRAVENOUS at 08:12

## 2020-12-16 RX ADMIN — CEFTRIAXONE 1 G: 1 INJECTION, SOLUTION INTRAVENOUS at 08:12

## 2020-12-16 RX ADMIN — ENOXAPARIN SODIUM 40 MG: 40 INJECTION SUBCUTANEOUS at 05:12

## 2020-12-16 RX ADMIN — DOXYCYCLINE HYCLATE 100 MG: 100 TABLET, COATED ORAL at 11:12

## 2020-12-16 RX ADMIN — LEVALBUTEROL 1.25 MG: 1.25 SOLUTION, CONCENTRATE RESPIRATORY (INHALATION) at 10:12

## 2020-12-16 RX ADMIN — PREDNISONE 20 MG: 20 TABLET ORAL at 05:12

## 2020-12-16 RX ADMIN — FAMOTIDINE 20 MG: 20 TABLET, FILM COATED ORAL at 09:12

## 2020-12-16 RX ADMIN — MIRTAZAPINE 15 MG: 15 TABLET, FILM COATED ORAL at 09:12

## 2020-12-16 RX ADMIN — LEVALBUTEROL 1.25 MG: 1.25 SOLUTION, CONCENTRATE RESPIRATORY (INHALATION) at 03:12

## 2020-12-16 NOTE — RESPIRATORY THERAPY
Patient transported to room __270__ from ER 14 on bipap 15/8 rt 10 at 35% FIO2.  Once in room 270 the patients bipap was connected to wall outlets. Bipap and alarms on and functioning.  AMBU bag and mask at bedside.  Nurse notified. Handoff report given to ICU RT.    Pre transport:   Heart rate: 100  Saturation: 98     Post transport:  Heart rate: 105  Saturation: 98

## 2020-12-16 NOTE — ASSESSMENT & PLAN NOTE
Duo to COPD exacerbation,ABG show worsening hypercapnia with hypoxia with PH of  7.3,PCO 2 of  91 and PO 2 of 65,she  required continues BiPAP.her rapid covid is negative,she was given IV Abx as well,at this time,she is comfortable on Bipap.pulmonolgy is consulted,will repeat ABG in few hours.

## 2020-12-16 NOTE — ED NOTES
Patient denied any needs at this time. Patient connected to BP cuff, continuous pulse ox and cardiac monitoring. Call light in reach. Bed rails up x2.  at bedside. Pt currently on BiPAP machine. VSS. AAO x4.

## 2020-12-16 NOTE — ED PROVIDER NOTES
Encounter Date: 12/16/2020       History     Chief Complaint   Patient presents with    Shortness of Breath     EMS reports pt called for SOB that started tonight.  Hx of COPD and elevated CO2.  Pt took 2 breathing treatments prior to EMS arrival and was 82% when EMS arrived.  Pt normally on 3L NC.  Pt 100% on 3L NC upon arrival.  EtCO2 was 72 per EMS.      Patient's for evaluation of shortness of breath that is been worsening since yesterday.  Patient has longstanding history of COPD.  She has a history of chronic respiratory failure.  She is on 2 L nasal cannula at all times.  EMS reports that patient was dyspneic on arrival.  Her O2 sats were in the 70s on 2 L. she took 2 albuterol treatments prior to EMS arrival.  EMS reports that patient's oxygen saturation improved when she is placed on their nasal cannula.  The center oxygen tubing was very long at home.  Patient admits that she has been more short normal for 2 days.  She denies significant wheezing.  She has a cough that is unchanged.  She denies fever.  She denies chest pain.  No back pain.  No abdominal pain.  No reported nausea, vomiting, or diarrhea.  No leg swelling or calf pain.  Patient was admitted earlier this year for COPD exacerbation.  That time she was hypercarbic and required BiPAP.  History is somewhat limited as patient appears slightly confused.  She has poor memory of the events of tonight.        Review of patient's allergies indicates:   Allergen Reactions    Hydrocodone Nausea And Vomiting and Other (See Comments)     Other reaction(s): upsets her stomach; severe abdominal cramping    Oxycodone Nausea And Vomiting     Other reaction(s): upsets stomach; severe abdominal cramps     Past Medical History:   Diagnosis Date    Colon polyp 10/3/2013    COPD (chronic obstructive pulmonary disease) with emphysema     DDD (degenerative disc disease), lumbar     GERD (gastroesophageal reflux disease)     Pulmonary nodules     Vitamin B 12  deficiency     bets B12 shots     Past Surgical History:   Procedure Laterality Date    BACK SURGERY      times 2 lumbar    HYSTERECTOMY      complete     Family History   Problem Relation Age of Onset    Cancer Mother         ovarian    Ovarian cancer Mother     Heart disease Father         CHF    Cancer Sister         breast and vulvar    Breast cancer Sister     Heart disease Brother         Myocarditis    Diabetes Mellitus Neg Hx     Stroke Neg Hx      Social History     Tobacco Use    Smoking status: Current Every Day Smoker     Packs/day: 1.00     Years: 35.00     Pack years: 35.00     Types: Cigarettes     Start date: 5/22/1976    Smokeless tobacco: Current User   Substance Use Topics    Alcohol use: Yes     Comment: occasional    Drug use: No     Review of Systems   Constitutional: Negative for chills, diaphoresis and fever.   HENT: Negative for congestion and sore throat.    Respiratory: Positive for cough, shortness of breath and wheezing. Negative for stridor.    Cardiovascular: Negative for chest pain, palpitations and leg swelling.   Gastrointestinal: Negative for abdominal pain, constipation, diarrhea, nausea and vomiting.   Musculoskeletal: Negative for back pain.   Neurological: Negative for headaches.   Psychiatric/Behavioral: Positive for confusion. Negative for decreased concentration.       Physical Exam     Initial Vitals [12/16/20 0445]   BP Pulse Resp Temp SpO2   110/68 (!) 117 (!) 24 98.4 °F (36.9 °C) 100 %      MAP       --         Physical Exam    Nursing note and vitals reviewed.  Constitutional: She appears well-developed and well-nourished. She is not diaphoretic. No distress.   HENT:   Head: Normocephalic and atraumatic.   Nose: Nose normal.   Mouth/Throat: Oropharynx is clear and moist.   Eyes: Conjunctivae and EOM are normal. Pupils are equal, round, and reactive to light. Right eye exhibits no discharge. Left eye exhibits no discharge. No scleral icterus.   Neck: Normal  range of motion. Neck supple. No tracheal deviation present. No JVD present.   Cardiovascular: Normal rate, regular rhythm and normal heart sounds.   No murmur heard.  Pulmonary/Chest: No stridor. No respiratory distress. She has wheezes. She has rhonchi. She has no rales. She exhibits no tenderness.   Decreased air movement bilaterally.   Abdominal: Soft. She exhibits no distension. There is no abdominal tenderness. There is no rebound and no guarding.   Musculoskeletal: Normal range of motion. No tenderness or edema.   Neurological: She is alert. She has normal strength. No cranial nerve deficit.   GCS 14.  Patient disoriented to time.   Skin: Skin is warm and dry. No erythema. There is pallor.   Psychiatric: Her behavior is normal. Judgment and thought content normal.   Depressed mood.  Flat affect.         ED Course   Critical Care    Date/Time: 12/16/2020 5:45 AM  Performed by: Donnell Clement MD  Authorized by: Donnell Clement MD   Direct patient critical care time: 10 minutes  Additional history critical care time: 5 minutes  Ordering / reviewing critical care time: 5 minutes  Documentation critical care time: 5 minutes  Consulting other physicians critical care time: 5 minutes  Consult with family critical care time: 5 minutes  Total critical care time (exclusive of procedural time) : 35 minutes  Critical care time was exclusive of separately billable procedures and treating other patients and teaching time.  Critical care was necessary to treat or prevent imminent or life-threatening deterioration of the following conditions: respiratory failure and shock.  Critical care was time spent personally by me on the following activities: development of treatment plan with patient or surrogate, discussions with consultants, evaluation of patient's response to treatment, examination of patient, obtaining history from patient or surrogate, ordering and performing treatments and interventions, ordering and  review of laboratory studies, ordering and review of radiographic studies, re-evaluation of patient's condition and review of old charts.        Labs Reviewed   CBC W/ AUTO DIFFERENTIAL - Abnormal; Notable for the following components:       Result Value    MCV 99 (*)     MCHC 30.3 (*)     Gran # (ANC) 8.0 (*)     Lymph # 0.9 (*)     Gran % 82.5 (*)     Lymph % 9.8 (*)     Mono % 3.5 (*)     All other components within normal limits   COMPREHENSIVE METABOLIC PANEL - Abnormal; Notable for the following components:    CO2 41 (*)     Glucose 138 (*)     Albumin 3.3 (*)     All other components within normal limits    Narrative:     CARBON DIOXIDE   critical result(s) called and verbal readback   obtained from CLARA STACY by ANNETTE 12/16/2020 05:54   ISTAT PROCEDURE - Abnormal; Notable for the following components:    POC PH 7.347 (*)     POC PCO2 91.3 (*)     POC PO2 65 (*)     POC HCO3 50.0 (*)     POC SATURATED O2 89 (*)     POC TCO2 >50 (*)     All other components within normal limits   ISTAT PROCEDURE - Abnormal; Notable for the following components:    POC PH 7.332 (*)     POC PCO2 93.8 (*)     POC HCO3 49.8 (*)     POC SATURATED O2 94 (*)     POC TCO2 >50 (*)     All other components within normal limits   TROPONIN I   MAGNESIUM   B-TYPE NATRIURETIC PEPTIDE   B-TYPE NATRIURETIC PEPTIDE   SARS-COV-2 RDRP GENE   POCT INFLUENZA A/B MOLECULAR     EKG Readings: (Independently Interpreted)   Initial Reading: No STEMI. Rhythm: Sinus Tachycardia. Heart Rate: 109. Ectopy: No Ectopy. Conduction: Normal. ST Segments: Normal ST Segments. T Waves: Normal. Axis: Right Axis Deviation.   No acute ischemic changes.     ECG Results          EKG 12-lead (Final result)  Result time 12/16/20 22:09:02    Final result by Interface, [facility] (12/16/20 22:09:02)                 Narrative:    Test Reason : R06.02,    Vent. Rate : 109 BPM     Atrial Rate : 109 BPM     P-R Int : 112 ms          QRS Dur : 072 ms      QT Int : 322 ms        P-R-T Axes : 089 093 075 degrees     QTc Int : 433 ms    Sinus tachycardia  Right atrial enlargement  Rightward axis  Pulmonary disease pattern  Abnormal ECG  When compared with ECG of 26-NOV-2020 17:45,  No significant change was found  Confirmed by Nadya Hager MD ([id number]) on 12/16/2020 10:08:55 PM    Referred By: [email or web address]   SELF           Confirmed By:Nadya Hager MD                             EKG 12-LEAD (Final result)  Result time 12/28/20 08:50:23    Final result by Unknown User (12/28/20 08:50:23)                                Imaging Results          X-Ray Chest AP Portable (Final result)  Result time 12/16/20 06:03:25    Final result by Jay Prather DO (12/16/20 06:03:25)                 Impression:      Hyperexpanded lungs suggestive of COPD.  No acute cardiopulmonary abnormality.      Electronically signed by: Jay Prather  Date:    12/16/2020  Time:    06:03             Narrative:    EXAMINATION:  XR CHEST AP PORTABLE    CLINICAL HISTORY:  Chest Pain;    TECHNIQUE:  Single frontal view of the chest was performed.    COMPARISON:  Multiple prior radiographs of the chest, most recent from 11/25/2020.  CT of the chest from 11/26/2020.    FINDINGS:  The lungs are well hyperexpanded and clear. No focal opacities are seen. The pleural spaces are clear.  The cardiac silhouette is unremarkable.  There are calcifications of the aortic arch.  There are right-sided remote rib fractures.                                 Medical Decision Making:   Initial Assessment:   Patient presents for evaluation of worsening shortness of breath and some mild confusion.  Patient has history of chronic respiratory failure.  Patient was admitted recently for COPD exacerbation hypercarbia.  Will check ABG.  Will check chest x-ray.  Will check basic lab work and COVID screen.  Differential Diagnosis:   COPD, pneumonia, COVID, respiratory failure, CHF  Clinical Tests:   Lab Tests: Ordered  Radiological Study:  Ordered  Medical Tests: Ordered and Reviewed  ED Management:  0600:  Patient signed out to Dr. Clement at shift change follow-up test results and determine final disposition. GORDON Contreras MD.    Assumed care of patient from Dr. Contreras, patient currently in mild respiratory distress, on Bipap 10/5.  ABG showing pH 7.34, pCO2 91.3.  Repeat ABG after being on Bipap for greater than 1 hour, showing slightly worsening trend.  Solumedrol/azithromycin/rocephing given for COPD exacerbation, duonebs scheduled, and with downward trending blood gases, will place patient in ICU for further management. Discussed diagnosis and further treatment with patient and family at bedside. All questions answered, patient transferred to ICU in stable condition.                           Clinical Impression:       ICD-10-CM ICD-9-CM   1. Acute on chronic respiratory failure with hypoxia and hypercapnia  J96.21 518.84    J96.22 786.09     799.02   2. SOB (shortness of breath)  R06.02 786.05   3. COPD exacerbation  J44.1 491.21   4. Respiratory failure with hypoxia and hypercapnia, unspecified chronicity  J96.91 518.81    J96.92    5. Palliative care encounter  Z51.5 V66.7   6. Advance care planning  Z71.89 V65.49                          ED Disposition Condition    Admit                             Donnell Clement MD  12/16/20 1225       Donnell Clement MD  01/08/21 0622

## 2020-12-16 NOTE — H&P
[facility] Hospital Medicine  History & Physical    Patient Name: Renée Lucas  MRN: [id number]  Admission Date: 12/16/2020  Attending Physician: Srini Nguyen MD   Primary Care Provider: Eric Gaines MD         Patient information was obtained from patient and ER records.     Subjective:     Principal Problem:Acute on chronic respiratory failure with hypoxia and hypercapnia    Chief Complaint:   Chief Complaint   Patient presents with    Shortness of Breath     EMS reports pt called for SOB that started tonight.  Hx of COPD and elevated CO2.  Pt took 2 breathing treatments prior to EMS arrival and was 82% when EMS arrived.  Pt normally on 3L NC.  Pt 100% on 3L NC upon arrival.  EtCO2 was 72 per EMS.         HPI: 60 years old patient with history  of end stage COPD,on home oxygen,tobacco abuse,still smoke,came with CC  of shortness of breath that is been worsening since yesterday.  Patient has longstanding history of COPD.  She has a history of chronic respiratory failure.  She is on 2 L nasal cannula at all times.  EMS reports that patient was dyspneic on arrival.  Her O2 sats were in the 70s on 2 L. she took 2 albuterol treatments prior to EMS arrival.  EMS reports that patient's oxygen saturation improved when she is placed on their nasal cannula.  She has a cough that is unchanged.  She denies fever.  She denies chest pain.  No back pain.  No abdominal pain.  No reported nausea, vomiting, or diarrhea.  No leg swelling or calf pain.  Patient was admitted earlier this year for COPD exacerbation.ABG show worsening hypercapnia with hypoxia with PH of  7.3,PCO 2 of  91 and PO 2 of 65,she  required continues BiPAP.her rapid covid is negative,she was given IV Abx as well,at this time,she is comfortable on Bipap.    Past Medical History:   Diagnosis Date    Colon polyp 10/3/2013    COPD (chronic obstructive pulmonary disease) with emphysema     DDD (degenerative disc disease),  lumbar     GERD (gastroesophageal reflux disease)     Pulmonary nodules     Vitamin B 12 deficiency     bets B12 shots       Past Surgical History:   Procedure Laterality Date    BACK SURGERY      times 2 lumbar    HYSTERECTOMY      complete       Review of patient's allergies indicates:   Allergen Reactions    Hydrocodone Nausea And Vomiting and Other (See Comments)     Other reaction(s): upsets her stomach; severe abdominal cramping    Oxycodone Nausea And Vomiting     Other reaction(s): upsets stomach; severe abdominal cramps       No current facility-administered medications on file prior to encounter.      Current Outpatient Medications on File Prior to Encounter   Medication Sig    albuterol (PROVENTIL/VENTOLIN HFA) 90 mcg/actuation inhaler INHALE 2 PUFFS PO Q 4 TO 6 H PRF SOB / WHEEZING    alpha-1proteinase 1,000 mg SolR Inject into the vein.    azelastine (ASTELIN) 137 mcg (0.1 %) nasal spray SPRAY TWICE IN NASAL BID    budesonide-formoterol 160-4.5 mcg (SYMBICORT) 160-4.5 mcg/actuation HFAA INHALE 2 PUFFS BY MOUTH TWICE DAILY    DALIRESP 500 mcg Tab Take 500 mcg by mouth once daily.     estradioL (ESTRACE) 1 MG tablet TAKE 1 TABLET(1 MG) BY MOUTH EVERY DAY    fluticasone (FLONASE) 50 mcg/actuation nasal spray SPRAY TWICE NASAL DAILY    gabapentin (NEURONTIN) 300 MG capsule TK 1 C PO TID    ipratropium (ATROVENT HFA) 17 mcg/actuation inhaler Inhale 2 puffs into the lungs every 6 (six) hours. Rescue    ketorolac (TORADOL) 10 mg tablet Take 1 tablet (10 mg total) by mouth every 6 (six) hours.    lidocaine (LIDODERM) 5 % Place 1 patch onto the skin once daily. Remove & Discard patch within 12 hours or as directed by MD    meloxicam (MOBIC) 7.5 MG tablet Take 1 tablet (7.5 mg total) by mouth once daily.    mirtazapine (REMERON) 15 MG tablet TAKE 1 TABLET(15 MG) BY MOUTH EVERY EVENING    montelukast (SINGULAIR) 10 mg tablet TAKE 1 TABLET BY MOUTH EVERY EVENING    nicotine polacrilex 2 MG  Lozg Take 1 lozenge (2 mg total) by mouth as needed.    paroxetine (PAXIL) 20 MG tablet Take 1 tablet (20 mg total) by mouth once daily.    predniSONE (DELTASONE) 5 MG tablet Take 1 tablet (5 mg total) by mouth once daily.    PROAIR HFA 90 mcg/actuation inhaler INHALE 2 PUFFS PO Q 4 TO 6 H PRF SOB / WHEEZING    SPIRIVA RESPIMAT 2.5 mcg/actuation Mist INL 2 PUFFS PO D    tramadol (ULTRAM) 50 mg tablet TK 1 TO 2 TS PO Q 4 TO 6 H PRN P     Family History     Problem Relation (Age of Onset)    Breast cancer Sister    Cancer Mother, Sister    Heart disease Father, Brother    Ovarian cancer Mother        Tobacco Use    Smoking status: Current Every Day Smoker     Packs/day: 1.00     Years: 35.00     Pack years: 35.00     Types: Cigarettes     Start date: 5/22/1976    Smokeless tobacco: Current User   Substance and Sexual Activity    Alcohol use: Yes     Comment: occasional    Drug use: No    Sexual activity: Yes     Partners: Male     Birth control/protection: Surgical     Review of Systems   Constitutional: Positive for activity change and appetite change.   HENT: Negative for congestion and drooling.    Eyes: Negative for discharge and itching.   Respiratory: Positive for cough, shortness of breath and wheezing. Negative for apnea and chest tightness.    Cardiovascular: Negative for chest pain and leg swelling.   Gastrointestinal: Negative for abdominal distention and abdominal pain.   Endocrine: Negative for cold intolerance and heat intolerance.   Genitourinary: Negative for difficulty urinating and dyspareunia.   Musculoskeletal: Negative for arthralgias and back pain.   Skin: Negative for color change and pallor.   Allergic/Immunologic: Negative for environmental allergies and food allergies.   Neurological: Negative for dizziness and headaches.   Hematological: Negative for adenopathy. Does not bruise/bleed easily.   Psychiatric/Behavioral: Negative for agitation and behavioral problems.     Objective:      Vital Signs (Most Recent):  Temp: 98 °F (36.7 °C) (12/16/20 1000)  Pulse: (!) 120 (12/16/20 1030)  Resp: (!) 34 (12/16/20 1030)  BP: (!) 119/59 (12/16/20 1030)  SpO2: 99 % (12/16/20 1030) Vital Signs (24h Range):  Temp:  [98 °F (36.7 °C)-98.4 °F (36.9 °C)] 98 °F (36.7 °C)  Pulse:  [101-121] 120  Resp:  [15-35] 34  SpO2:  [95 %-100 %] 99 %  BP: (109-145)/(57-68) 119/59     Weight: 37.4 kg (82 lb 7.2 oz)  Body mass index is 14.61 kg/m².    Physical Exam  HENT:      Head: Normocephalic.      Nose: Nose normal.   Eyes:      Extraocular Movements: Extraocular movements intact.      Pupils: Pupils are equal, round, and reactive to light.   Neck:      Musculoskeletal: Normal range of motion and neck supple.   Cardiovascular:      Rate and Rhythm: Normal rate and regular rhythm.      Pulses: Normal pulses.   Pulmonary:      Breath sounds: Wheezing present.      Comments: Diminished breath sound,all lung fields  Abdominal:      General: Abdomen is flat.      Palpations: Abdomen is soft.   Musculoskeletal: Normal range of motion.         General: No swelling, tenderness, deformity or signs of injury.   Skin:     General: Skin is warm and dry.      Coloration: Skin is not jaundiced or pale.   Neurological:      General: No focal deficit present.      Mental Status: She is alert and oriented to person, place, and time.   Psychiatric:         Mood and Affect: Mood normal.         Behavior: Behavior normal.           CRANIAL NERVES     CN III, IV, VI   Pupils are equal, round, and reactive to light.       Significant Labs:   BMP:   Recent Labs   Lab 12/16/20  0458   *      K 4.3   CL 96   CO2 41*   BUN 14   CREATININE 0.6   CALCIUM 9.9   MG 1.9     CBC:   Recent Labs   Lab 12/16/20  0458   WBC 9.64   HGB 12.8   HCT 42.2        CMP:   Recent Labs   Lab 12/16/20  0458      K 4.3   CL 96   CO2 41*   *   BUN 14   CREATININE 0.6   CALCIUM 9.9   PROT 6.4   ALBUMIN 3.3*   BILITOT 0.2   ALKPHOS 75    AST 14   ALT 10   ANIONGAP 8   EGFRNONAA >60       Significant Imaging: reviewed.    Assessment/Plan:     * Acute on chronic respiratory failure with hypoxia and hypercapnia  Duo to COPD exacerbation,ABG show worsening hypercapnia with hypoxia with PH of  7.3,PCO 2 of  91 and PO 2 of 65,she  required continues BiPAP.her rapid covid is negative,she was given IV Abx as well,at this time,she is comfortable on Bipap.pulmonolgy is consulted,will repeat ABG in few hours.      COPD exacerbation  Started on IV solumedrol,Nebuzlier with Xopenex,also on singular.,      Chronic respiratory failure with hypoxia  On 2-3 liter home oxygen.      Tobacco abuse  Consulted over 6 minutes need quit smoking.      DDD (degenerative disc disease), lumbar  On prn Gabapentin.      GERD (gastroesophageal reflux disease)  On famotidine.        VTE Risk Mitigation (From admission, onward)         Ordered     enoxaparin injection 40 mg  Every 24 hours      12/16/20 0959              Critical care time spent on the evaluation and treatment of severe organ dysfunction, review of pertinent labs and imaging studies, discussions with consulting providers and discussions with patient/family: over 45  minutes.     Srini Nguyen MD  Department of Hospital Medicine   [facility]

## 2020-12-16 NOTE — HPI
60 years old patient with history  of end stage COPD,on home oxygen,tobacco abuse,still smoke,came with CC  of shortness of breath that is been worsening since yesterday.  Patient has longstanding history of COPD.  She has a history of chronic respiratory failure.  She is on 2 L nasal cannula at all times.  EMS reports that patient was dyspneic on arrival.  Her O2 sats were in the 70s on 2 L. she took 2 albuterol treatments prior to EMS arrival.  EMS reports that patient's oxygen saturation improved when she is placed on their nasal cannula.  She has a cough that is unchanged.  She denies fever.  She denies chest pain.  No back pain.  No abdominal pain.  No reported nausea, vomiting, or diarrhea.  No leg swelling or calf pain.  Patient was admitted earlier this year for COPD exacerbation.ABG show worsening hypercapnia with hypoxia with PH of  7.3,PCO 2 of  91 and PO 2 of 65,she  required continues BiPAP.her rapid covid is negative,she was given IV Abx as well,at this time,she is comfortable on Bipap.

## 2020-12-16 NOTE — SUBJECTIVE & OBJECTIVE
Past Medical History:   Diagnosis Date    Colon polyp 10/3/2013    COPD (chronic obstructive pulmonary disease) with emphysema     DDD (degenerative disc disease), lumbar     GERD (gastroesophageal reflux disease)     Pulmonary nodules     Vitamin B 12 deficiency     bets B12 shots       Past Surgical History:   Procedure Laterality Date    BACK SURGERY      times 2 lumbar    HYSTERECTOMY      complete       Review of patient's allergies indicates:   Allergen Reactions    Hydrocodone Nausea And Vomiting and Other (See Comments)     Other reaction(s): upsets her stomach; severe abdominal cramping    Oxycodone Nausea And Vomiting     Other reaction(s): upsets stomach; severe abdominal cramps       No current facility-administered medications on file prior to encounter.      Current Outpatient Medications on File Prior to Encounter   Medication Sig    albuterol (PROVENTIL/VENTOLIN HFA) 90 mcg/actuation inhaler INHALE 2 PUFFS PO Q 4 TO 6 H PRF SOB / WHEEZING    alpha-1proteinase 1,000 mg SolR Inject into the vein.    azelastine (ASTELIN) 137 mcg (0.1 %) nasal spray SPRAY TWICE IN NASAL BID    budesonide-formoterol 160-4.5 mcg (SYMBICORT) 160-4.5 mcg/actuation HFAA INHALE 2 PUFFS BY MOUTH TWICE DAILY    DALIRESP 500 mcg Tab Take 500 mcg by mouth once daily.     estradioL (ESTRACE) 1 MG tablet TAKE 1 TABLET(1 MG) BY MOUTH EVERY DAY    fluticasone (FLONASE) 50 mcg/actuation nasal spray SPRAY TWICE NASAL DAILY    gabapentin (NEURONTIN) 300 MG capsule TK 1 C PO TID    ipratropium (ATROVENT HFA) 17 mcg/actuation inhaler Inhale 2 puffs into the lungs every 6 (six) hours. Rescue    ketorolac (TORADOL) 10 mg tablet Take 1 tablet (10 mg total) by mouth every 6 (six) hours.    lidocaine (LIDODERM) 5 % Place 1 patch onto the skin once daily. Remove & Discard patch within 12 hours or as directed by MD    meloxicam (MOBIC) 7.5 MG tablet Take 1 tablet (7.5 mg total) by mouth once daily.    mirtazapine (REMERON)  15 MG tablet TAKE 1 TABLET(15 MG) BY MOUTH EVERY EVENING    montelukast (SINGULAIR) 10 mg tablet TAKE 1 TABLET BY MOUTH EVERY EVENING    nicotine polacrilex 2 MG Lozg Take 1 lozenge (2 mg total) by mouth as needed.    paroxetine (PAXIL) 20 MG tablet Take 1 tablet (20 mg total) by mouth once daily.    predniSONE (DELTASONE) 5 MG tablet Take 1 tablet (5 mg total) by mouth once daily.    PROAIR HFA 90 mcg/actuation inhaler INHALE 2 PUFFS PO Q 4 TO 6 H PRF SOB / WHEEZING    SPIRIVA RESPIMAT 2.5 mcg/actuation Mist INL 2 PUFFS PO D    tramadol (ULTRAM) 50 mg tablet TK 1 TO 2 TS PO Q 4 TO 6 H PRN P     Family History     Problem Relation (Age of Onset)    Breast cancer Sister    Cancer Mother, Sister    Heart disease Father, Brother    Ovarian cancer Mother        Tobacco Use    Smoking status: Current Every Day Smoker     Packs/day: 1.00     Years: 35.00     Pack years: 35.00     Types: Cigarettes     Start date: 5/22/1976    Smokeless tobacco: Current User   Substance and Sexual Activity    Alcohol use: Yes     Comment: occasional    Drug use: No    Sexual activity: Yes     Partners: Male     Birth control/protection: Surgical     Review of Systems   Constitutional: Positive for activity change and appetite change.   HENT: Negative for congestion and drooling.    Eyes: Negative for discharge and itching.   Respiratory: Positive for cough, shortness of breath and wheezing. Negative for apnea and chest tightness.    Cardiovascular: Negative for chest pain and leg swelling.   Gastrointestinal: Negative for abdominal distention and abdominal pain.   Endocrine: Negative for cold intolerance and heat intolerance.   Genitourinary: Negative for difficulty urinating and dyspareunia.   Musculoskeletal: Negative for arthralgias and back pain.   Skin: Negative for color change and pallor.   Allergic/Immunologic: Negative for environmental allergies and food allergies.   Neurological: Negative for dizziness and headaches.    Hematological: Negative for adenopathy. Does not bruise/bleed easily.   Psychiatric/Behavioral: Negative for agitation and behavioral problems.     Objective:     Vital Signs (Most Recent):  Temp: 98 °F (36.7 °C) (12/16/20 1000)  Pulse: (!) 120 (12/16/20 1030)  Resp: (!) 34 (12/16/20 1030)  BP: (!) 119/59 (12/16/20 1030)  SpO2: 99 % (12/16/20 1030) Vital Signs (24h Range):  Temp:  [98 °F (36.7 °C)-98.4 °F (36.9 °C)] 98 °F (36.7 °C)  Pulse:  [101-121] 120  Resp:  [15-35] 34  SpO2:  [95 %-100 %] 99 %  BP: (109-145)/(57-68) 119/59     Weight: 37.4 kg (82 lb 7.2 oz)  Body mass index is 14.61 kg/m².    Physical Exam  HENT:      Head: Normocephalic.      Nose: Nose normal.   Eyes:      Extraocular Movements: Extraocular movements intact.      Pupils: Pupils are equal, round, and reactive to light.   Neck:      Musculoskeletal: Normal range of motion and neck supple.   Cardiovascular:      Rate and Rhythm: Normal rate and regular rhythm.      Pulses: Normal pulses.   Pulmonary:      Breath sounds: Wheezing present.      Comments: Diminished breath sound,all lung fields  Abdominal:      General: Abdomen is flat.      Palpations: Abdomen is soft.   Musculoskeletal: Normal range of motion.         General: No swelling, tenderness, deformity or signs of injury.   Skin:     General: Skin is warm and dry.      Coloration: Skin is not jaundiced or pale.   Neurological:      General: No focal deficit present.      Mental Status: She is alert and oriented to person, place, and time.   Psychiatric:         Mood and Affect: Mood normal.         Behavior: Behavior normal.           CRANIAL NERVES     CN III, IV, VI   Pupils are equal, round, and reactive to light.       Significant Labs:   BMP:   Recent Labs   Lab 12/16/20  0458   *      K 4.3   CL 96   CO2 41*   BUN 14   CREATININE 0.6   CALCIUM 9.9   MG 1.9     CBC:   Recent Labs   Lab 12/16/20  0458   WBC 9.64   HGB 12.8   HCT 42.2        CMP:   Recent Labs    Lab 12/16/20  0458      K 4.3   CL 96   CO2 41*   *   BUN 14   CREATININE 0.6   CALCIUM 9.9   PROT 6.4   ALBUMIN 3.3*   BILITOT 0.2   ALKPHOS 75   AST 14   ALT 10   ANIONGAP 8   EGFRNONAA >60       Significant Imaging: reviewed.

## 2020-12-16 NOTE — ED TRIAGE NOTES
Pt states she woke up approx 2 hours ago feeling sob. Pt tried giving herself 2 treatments, but didn't receive any relief. Pt was 82% on room air when EMS arrived. Pt arrived to ER on her normal 3L NC, with sats of 97-99%. Pt currently denies sob, chest pain, nvd, fever, cough, dizziness

## 2020-12-17 LAB
ALLENS TEST: ABNORMAL
DELSYS: ABNORMAL
EP: 8
ERYTHROCYTE [SEDIMENTATION RATE] IN BLOOD BY WESTERGREN METHOD: 10 MM/H
FIO2: 45
HCO3 UR-SCNC: 51.1 MMOL/L (ref 24–28)
IP: 15
MODE: ABNORMAL
PCO2 BLDA: 87.6 MMHG (ref 35–45)
PH SMN: 7.37 [PH] (ref 7.35–7.45)
PO2 BLDA: 72 MMHG (ref 40–60)
POC BE: 21 MMOL/L
POC SATURATED O2: 92 % (ref 95–100)
POC TCO2: >50 MMOL/L (ref 24–29)
SAMPLE: ABNORMAL
SITE: ABNORMAL

## 2020-12-17 PROCEDURE — 82803 BLOOD GASES ANY COMBINATION: CPT

## 2020-12-17 PROCEDURE — 25000003 PHARM REV CODE 250: Performed by: HOSPITALIST

## 2020-12-17 PROCEDURE — 99221 PR INITIAL HOSPITAL CARE,LEVL I: ICD-10-PCS | Mod: ,,, | Performed by: NURSE PRACTITIONER

## 2020-12-17 PROCEDURE — 99221 1ST HOSP IP/OBS SF/LOW 40: CPT | Mod: ,,, | Performed by: NURSE PRACTITIONER

## 2020-12-17 PROCEDURE — 99497 PR ADVNCD CARE PLAN 30 MIN: ICD-10-PCS | Mod: 25,,, | Performed by: NURSE PRACTITIONER

## 2020-12-17 PROCEDURE — 21400001 HC TELEMETRY ROOM

## 2020-12-17 PROCEDURE — 94761 N-INVAS EAR/PLS OXIMETRY MLT: CPT

## 2020-12-17 PROCEDURE — 25000242 PHARM REV CODE 250 ALT 637 W/ HCPCS: Performed by: HOSPITALIST

## 2020-12-17 PROCEDURE — 63600175 PHARM REV CODE 636 W HCPCS: Performed by: HOSPITALIST

## 2020-12-17 PROCEDURE — 94660 CPAP INITIATION&MGMT: CPT

## 2020-12-17 PROCEDURE — 99497 ADVNCD CARE PLAN 30 MIN: CPT | Mod: 25,,, | Performed by: NURSE PRACTITIONER

## 2020-12-17 PROCEDURE — 94640 AIRWAY INHALATION TREATMENT: CPT

## 2020-12-17 PROCEDURE — 27000221 HC OXYGEN, UP TO 24 HOURS

## 2020-12-17 PROCEDURE — 99900035 HC TECH TIME PER 15 MIN (STAT)

## 2020-12-17 RX ADMIN — LEVALBUTEROL 1.25 MG: 1.25 SOLUTION, CONCENTRATE RESPIRATORY (INHALATION) at 03:12

## 2020-12-17 RX ADMIN — METHYLPREDNISOLONE SODIUM SUCCINATE 80 MG: 40 INJECTION, POWDER, FOR SOLUTION INTRAMUSCULAR; INTRAVENOUS at 04:12

## 2020-12-17 RX ADMIN — ENOXAPARIN SODIUM 40 MG: 40 INJECTION SUBCUTANEOUS at 06:12

## 2020-12-17 RX ADMIN — DOXYCYCLINE HYCLATE 100 MG: 100 TABLET, COATED ORAL at 10:12

## 2020-12-17 RX ADMIN — LEVALBUTEROL 1.25 MG: 1.25 SOLUTION, CONCENTRATE RESPIRATORY (INHALATION) at 07:12

## 2020-12-17 RX ADMIN — MONTELUKAST 10 MG: 10 TABLET, FILM COATED ORAL at 10:12

## 2020-12-17 RX ADMIN — MIRTAZAPINE 15 MG: 15 TABLET, FILM COATED ORAL at 10:12

## 2020-12-17 RX ADMIN — METHYLPREDNISOLONE SODIUM SUCCINATE 80 MG: 40 INJECTION, POWDER, FOR SOLUTION INTRAMUSCULAR; INTRAVENOUS at 06:12

## 2020-12-17 RX ADMIN — METHYLPREDNISOLONE SODIUM SUCCINATE 80 MG: 40 INJECTION, POWDER, FOR SOLUTION INTRAMUSCULAR; INTRAVENOUS at 10:12

## 2020-12-17 RX ADMIN — FAMOTIDINE 20 MG: 20 TABLET, FILM COATED ORAL at 10:12

## 2020-12-17 RX ADMIN — PAROXETINE HYDROCHLORIDE 10 MG: 10 SUSPENSION ORAL at 10:12

## 2020-12-17 NOTE — PT/OT/SLP PROGRESS
Occupational Therapy      Patient Name:  Renée Lucas   MRN:  [id number]    Patient not seen today secondary to per nurse Marely, pt w/ HR that significantly increases w/ minimal activity; pt just t/f from ICU this date. Will follow-up 12/18/2020.    Harlan Weiner OT  12/17/2020

## 2020-12-17 NOTE — HPI
Principal Problem:Acute on chronic respiratory failure with hypoxia and hypercapnia           HPI:  60 years old patient with history  of end stage COPD,on home oxygen,tobacco abuse,still smoke,came with CC  of shortness of breath that is been worsening since yesterday.  Patient has longstanding history of COPD.  She has a history of chronic respiratory failure.  She is on 2 L nasal cannula at all times.  EMS reports that patient was dyspneic on arrival.  Her O2 sats were in the 70s on 2 L. she took 2 albuterol treatments prior to EMS arrival.  EMS reports that patient's oxygen saturation improved when she is placed on their nasal cannula.  She has a cough that is unchanged.  She denies fever.  She denies chest pain.  No back pain.  No abdominal pain.  No reported nausea, vomiting, or diarrhea.  No leg swelling or calf pain.  Patient was admitted earlier this year for COPD exacerbation.ABG show worsening hypercapnia with hypoxia with PH of  7.3,PCO 2 of  91 and PO 2 of 65,she  required continues BiPAP.her rapid covid is negative,she was given IV Abx as well,at this time,she is comfortable on Bipap.

## 2020-12-17 NOTE — HOSPITAL COURSE
60 years old patient with history  of end stage COPD,on home oxygen,tobacco abuse,still smoke,came with CC  of shortness of breath that is been worsening .  Patient has longstanding history of COPD.  She has a history of chronic respiratory failure.  She is on 2 L nasal cannula at all times.  EMS reports that patient was dyspneic on arrival.  Her O2 sats were in the 70s on 2 L. she took 2 albuterol treatments prior to EMS arrival.  EMS reports that patient's oxygen saturation improved when she is placed on their nasal cannula.  She has a cough that is unchanged.  She denies fever.  She denies chest pain.  No back pain.  No abdominal pain.  No reported nausea, vomiting, or diarrhea.  No leg swelling or calf pain.  Patient was admitted earlier this year for COPD exacerbation.ABG show worsening hypercapnia with hypoxia with PH of  7.3,PCO 2 of  91 and PO 2 of 65,she  required continues BiPAP.her rapid covid is negative,she was given IV Abx as well,at this time,she is comfortable on Bipap.her ABG overall improved,she is chronic CO 2 retainer,her baseline appear to be CO 2 about 80,she is awake time 3,switched continues bipap to nightly   bipap,ordered Bipap,consulted SW.consulted PT,OT,need bipap  before discharged.

## 2020-12-17 NOTE — PROGRESS NOTES
[facility] Hospital Medicine  Progress Note    Patient Name: Renée Lucas  MRN: [id number]  Patient Class: IP- Inpatient   Admission Date: 12/16/2020  Length of Stay: 1 days  Attending Physician: Srini Nguyen MD  Primary Care Provider: Eric Gaines MD        Subjective:     Principal Problem:Acute on chronic respiratory failure with hypoxia and hypercapnia        HPI:  60 years old patient with history  of end stage COPD,on home oxygen,tobacco abuse,still smoke,came with CC  of shortness of breath that is been worsening since yesterday.  Patient has longstanding history of COPD.  She has a history of chronic respiratory failure.  She is on 2 L nasal cannula at all times.  EMS reports that patient was dyspneic on arrival.  Her O2 sats were in the 70s on 2 L. she took 2 albuterol treatments prior to EMS arrival.  EMS reports that patient's oxygen saturation improved when she is placed on their nasal cannula.  She has a cough that is unchanged.  She denies fever.  She denies chest pain.  No back pain.  No abdominal pain.  No reported nausea, vomiting, or diarrhea.  No leg swelling or calf pain.  Patient was admitted earlier this year for COPD exacerbation.ABG show worsening hypercapnia with hypoxia with PH of  7.3,PCO 2 of  91 and PO 2 of 65,she  required continues BiPAP.her rapid covid is negative,she was given IV Abx as well,at this time,she is comfortable on Bipap.    Overview/Hospital Course:  60 years old patient with history  of end stage COPD,on home oxygen,tobacco abuse,still smoke,came with CC  of shortness of breath that is been worsening .  Patient has longstanding history of COPD.  She has a history of chronic respiratory failure.  She is on 2 L nasal cannula at all times.  EMS reports that patient was dyspneic on arrival.  Her O2 sats were in the 70s on 2 L. she took 2 albuterol treatments prior to EMS arrival.  EMS reports that patient's oxygen saturation improved when  she is placed on their nasal cannula.  She has a cough that is unchanged.  She denies fever.  She denies chest pain.  No back pain.  No abdominal pain.  No reported nausea, vomiting, or diarrhea.  No leg swelling or calf pain.  Patient was admitted earlier this year for COPD exacerbation.ABG show worsening hypercapnia with hypoxia with PH of  7.3,PCO 2 of  91 and PO 2 of 65,she  required continues BiPAP.her rapid covid is negative,she was given IV Abx as well,at this time,she is comfortable on Bipap.her ABG overall improved,she is chronic CO 2 retainer,her baseline appear to be CO 2 about 80,she is awake time 3,switched continues bipap to nightly   bipap,ordered Bipap,consulted SW.consulted PT,OT,need bipap  before discharged.    Past Medical History:   Diagnosis Date    Colon polyp 10/3/2013    COPD (chronic obstructive pulmonary disease) with emphysema     DDD (degenerative disc disease), lumbar     GERD (gastroesophageal reflux disease)     Pulmonary nodules     Vitamin B 12 deficiency     bets B12 shots       Past Surgical History:   Procedure Laterality Date    BACK SURGERY      times 2 lumbar    HYSTERECTOMY      complete       Review of patient's allergies indicates:   Allergen Reactions    Hydrocodone Nausea And Vomiting and Other (See Comments)     Other reaction(s): upsets her stomach; severe abdominal cramping    Oxycodone Nausea And Vomiting     Other reaction(s): upsets stomach; severe abdominal cramps       No current facility-administered medications on file prior to encounter.      Current Outpatient Medications on File Prior to Encounter   Medication Sig    albuterol (PROVENTIL/VENTOLIN HFA) 90 mcg/actuation inhaler INHALE 2 PUFFS PO Q 4 TO 6 H PRF SOB / WHEEZING    alpha-1proteinase 1,000 mg SolR Inject into the vein.    azelastine (ASTELIN) 137 mcg (0.1 %) nasal spray SPRAY TWICE IN NASAL BID    budesonide-formoterol 160-4.5 mcg (SYMBICORT) 160-4.5 mcg/actuation HFAA INHALE 2 PUFFS  BY MOUTH TWICE DAILY    DALIRESP 500 mcg Tab Take 500 mcg by mouth once daily.     estradioL (ESTRACE) 1 MG tablet TAKE 1 TABLET(1 MG) BY MOUTH EVERY DAY    fluticasone (FLONASE) 50 mcg/actuation nasal spray SPRAY TWICE NASAL DAILY    gabapentin (NEURONTIN) 300 MG capsule TK 1 C PO TID    ipratropium (ATROVENT HFA) 17 mcg/actuation inhaler Inhale 2 puffs into the lungs every 6 (six) hours. Rescue    ketorolac (TORADOL) 10 mg tablet Take 1 tablet (10 mg total) by mouth every 6 (six) hours.    lidocaine (LIDODERM) 5 % Place 1 patch onto the skin once daily. Remove & Discard patch within 12 hours or as directed by MD    meloxicam (MOBIC) 7.5 MG tablet Take 1 tablet (7.5 mg total) by mouth once daily.    mirtazapine (REMERON) 15 MG tablet TAKE 1 TABLET(15 MG) BY MOUTH EVERY EVENING    montelukast (SINGULAIR) 10 mg tablet TAKE 1 TABLET BY MOUTH EVERY EVENING    nicotine polacrilex 2 MG Lozg Take 1 lozenge (2 mg total) by mouth as needed.    paroxetine (PAXIL) 20 MG tablet Take 1 tablet (20 mg total) by mouth once daily.    predniSONE (DELTASONE) 5 MG tablet Take 1 tablet (5 mg total) by mouth once daily.    PROAIR HFA 90 mcg/actuation inhaler INHALE 2 PUFFS PO Q 4 TO 6 H PRF SOB / WHEEZING    SPIRIVA RESPIMAT 2.5 mcg/actuation Mist INL 2 PUFFS PO D    tramadol (ULTRAM) 50 mg tablet TK 1 TO 2 TS PO Q 4 TO 6 H PRN P     Family History     Problem Relation (Age of Onset)    Breast cancer Sister    Cancer Mother, Sister    Heart disease Father, Brother    Ovarian cancer Mother        Tobacco Use    Smoking status: Current Every Day Smoker     Packs/day: 1.00     Years: 35.00     Pack years: 35.00     Types: Cigarettes     Start date: 5/22/1976    Smokeless tobacco: Current User   Substance and Sexual Activity    Alcohol use: Yes     Comment: occasional    Drug use: No    Sexual activity: Yes     Partners: Male     Birth control/protection: Surgical     Review of Systems   Constitutional: Positive for  activity change and appetite change.   HENT: Negative for congestion and drooling.    Eyes: Negative for discharge and itching.   Respiratory: Positive for cough, shortness of breath and wheezing. Negative for apnea and chest tightness.    Cardiovascular: Negative for chest pain and leg swelling.   Gastrointestinal: Negative for abdominal distention and abdominal pain.   Endocrine: Negative for cold intolerance and heat intolerance.   Genitourinary: Negative for difficulty urinating and dyspareunia.   Musculoskeletal: Negative for arthralgias and back pain.   Skin: Negative for color change and pallor.   Allergic/Immunologic: Negative for environmental allergies and food allergies.   Neurological: Negative for dizziness and headaches.   Hematological: Negative for adenopathy. Does not bruise/bleed easily.   Psychiatric/Behavioral: Negative for agitation and behavioral problems.     Objective:     Vital Signs (Most Recent):  Temp: 97.5 °F (36.4 °C) (12/17/20 0715)  Pulse: 96 (12/17/20 0900)  Resp: (!) 24 (12/17/20 0900)  BP: 136/64 (12/17/20 0900)  SpO2: 99 % (12/17/20 0900) Vital Signs (24h Range):  Temp:  [97.5 °F (36.4 °C)-98.4 °F (36.9 °C)] 97.5 °F (36.4 °C)  Pulse:  [] 96  Resp:  [14-45] 24  SpO2:  [76 %-100 %] 99 %  BP: (109-137)/(55-69) 136/64     Weight: 37.4 kg (82 lb 7.2 oz)  Body mass index is 14.61 kg/m².    Physical Exam  HENT:      Head: Normocephalic.      Nose: Nose normal.   Eyes:      Extraocular Movements: Extraocular movements intact.      Pupils: Pupils are equal, round, and reactive to light.   Neck:      Musculoskeletal: Normal range of motion and neck supple.   Cardiovascular:      Rate and Rhythm: Normal rate and regular rhythm.      Pulses: Normal pulses.   Pulmonary:      Breath sounds: Wheezing present.      Comments: Diminished breath sound,all lung fields  Abdominal:      General: Abdomen is flat.      Palpations: Abdomen is soft.   Musculoskeletal: Normal range of motion.          General: No swelling, tenderness, deformity or signs of injury.   Skin:     General: Skin is warm and dry.      Coloration: Skin is not jaundiced or pale.   Neurological:      General: No focal deficit present.      Mental Status: She is alert and oriented to person, place, and time.   Psychiatric:         Mood and Affect: Mood normal.         Behavior: Behavior normal.           CRANIAL NERVES     CN III, IV, VI   Pupils are equal, round, and reactive to light.       Significant Labs:   BMP:   Recent Labs   Lab 12/16/20  0458   *      K 4.3   CL 96   CO2 41*   BUN 14   CREATININE 0.6   CALCIUM 9.9   MG 1.9     CBC:   Recent Labs   Lab 12/16/20  0458   WBC 9.64   HGB 12.8   HCT 42.2        CMP:   Recent Labs   Lab 12/16/20  0458      K 4.3   CL 96   CO2 41*   *   BUN 14   CREATININE 0.6   CALCIUM 9.9   PROT 6.4   ALBUMIN 3.3*   BILITOT 0.2   ALKPHOS 75   AST 14   ALT 10   ANIONGAP 8   EGFRNONAA >60       Significant Imaging: reviewed.      Assessment/Plan:      * Acute on chronic respiratory failure with hypoxia and hypercapnia  Duo to COPD exacerbation,ABG show worsening hypercapnia with hypoxia with PH of  7.3,PCO 2 of  91 and PO 2 of 65,she  required continues BiPAP.her rapid covid is negative,she was given IV Abx as well,at this time,she is comfortable on Bipap.her ABG overall improved,she is chronic CO 2 retainer,her baseline appear to be CO 2 about 80,she is awake time 3,switched continues bipap to nightly   bipap,ordered Bipap,consulted SW.consulted PT,OT,need bipap  before discharged.      COPD exacerbation  Started on IV solumedrol,Nebuzlier with Xopenex,also on singular.,      Chronic respiratory failure with hypoxia  On 2-3 liter home oxygen.      Tobacco abuse  Consulted over 6 minutes need quit smoking.      DDD (degenerative disc disease), lumbar  On prn Gabapentin.      GERD (gastroesophageal reflux disease)  On famotidine.        VTE Risk Mitigation (From admission,  onward)         Ordered     enoxaparin injection 40 mg  Every 24 hours      12/16/20 0959                Discharge Planning   CARLIN:      Code Status: Prior   Is the patient medically ready for discharge?:     Reason for patient still in hospital (select all that apply): Patient trending condition               Critical care time spent on the evaluation and treatment of severe organ dysfunction, review of pertinent labs and imaging studies, discussions with consulting providers and discussions with patient/family: over 45  minutes.      Srini Nguyen MD  Department of Hospital Medicine   [facility]

## 2020-12-17 NOTE — SUBJECTIVE & OBJECTIVE
Interval History: patient transferred to floor. She is SOB at rest but states she feels better.     Past Medical History:   Diagnosis Date    Colon polyp 10/3/2013    COPD (chronic obstructive pulmonary disease) with emphysema     DDD (degenerative disc disease), lumbar     GERD (gastroesophageal reflux disease)     Pulmonary nodules     Vitamin B 12 deficiency     bets B12 shots       Past Surgical History:   Procedure Laterality Date    BACK SURGERY      times 2 lumbar    HYSTERECTOMY      complete       Review of patient's allergies indicates:   Allergen Reactions    Hydrocodone Nausea And Vomiting and Other (See Comments)     Other reaction(s): upsets her stomach; severe abdominal cramping    Oxycodone Nausea And Vomiting     Other reaction(s): upsets stomach; severe abdominal cramps       Medications:  Continuous Infusions:  Scheduled Meds:   doxycycline  100 mg Oral Q12H    enoxaparin  40 mg Subcutaneous Q24H    famotidine  20 mg Oral BID    levalbuterol  1.25 mg Nebulization Q8H    methylPREDNISolone sodium succinate  80 mg Intravenous Q8H    mirtazapine  15 mg Oral Nightly    montelukast  10 mg Oral Daily    paroxetine  10 mg Oral Daily     PRN Meds:gabapentin, traMADoL    Family History     Problem Relation (Age of Onset)    Breast cancer Sister    Cancer Mother, Sister    Heart disease Father, Brother    Ovarian cancer Mother        Tobacco Use    Smoking status: Current Every Day Smoker     Packs/day: 1.00     Years: 35.00     Pack years: 35.00     Types: Cigarettes     Start date: 5/22/1976    Smokeless tobacco: Current User   Substance and Sexual Activity    Alcohol use: Yes     Comment: occasional    Drug use: No    Sexual activity: Yes     Partners: Male     Birth control/protection: Surgical       Review of Systems   Constitutional: Positive for activity change and appetite change.   Respiratory: Positive for shortness of breath.      Objective:     Vital Signs (Most  Recent):  Temp: 97.5 °F (36.4 °C) (12/17/20 0715)  Pulse: 98 (12/17/20 1100)  Resp: (!) 24 (12/17/20 1100)  BP: 124/63 (12/17/20 1100)  SpO2: 96 % (12/17/20 1100) Vital Signs (24h Range):  Temp:  [97.5 °F (36.4 °C)-98.1 °F (36.7 °C)] 97.5 °F (36.4 °C)  Pulse:  [] 98  Resp:  [14-45] 24  SpO2:  [76 %-100 %] 96 %  BP: (109-137)/(58-65) 124/63     Weight: 37.4 kg (82 lb 7.2 oz)  Body mass index is 14.61 kg/m².    Physical Exam  Vitals signs and nursing note reviewed.   Constitutional:       General: She is not in acute distress.     Appearance: She is cachectic. She is ill-appearing. She is not toxic-appearing.      Interventions: Nasal cannula in place.   HENT:      Nose: Nose normal.      Mouth/Throat:      Mouth: Mucous membranes are dry.   Neurological:      Mental Status: She is alert.         Review of Symptoms    Symptom Assessment (ESAS 0-10 Scale)  Pain:  0  Dyspnea:  7  Anxiety:  0  Nausea:  0  Depression:  0  Anorexia:  0  Fatigue:  0  Insomnia:  0  Restlessness:  0  Agitation:  0     CAM / Delirium:  Negative  Constipation:  Negative  Diarrhea:  Negative          Performance Status:  40    Living Arrangements:  Lives with family    Psychosocial/Cultural: Patient lives with spouse, daughter, son in law and grandchild. Patient reports everyone in house smokes so its hard for her to even try to stop.       Advance Care Planning   Advance Directives:   Living Will: No    LaPOST: No    Do Not Resuscitate Status: No    Medical Power of : No           Significant Labs: All pertinent labs within the past 24 hours have been reviewed.  CBC:   Recent Labs   Lab 12/16/20  0458   WBC 9.64   HGB 12.8   HCT 42.2   MCV 99*        BMP:  No results for input(s): GLU, NA, K, CL, CO2, BUN, CREATININE, CALCIUM, MG in the last 24 hours.  LFT:  Lab Results   Component Value Date    AST 14 12/16/2020    ALKPHOS 75 12/16/2020    BILITOT 0.2 12/16/2020     Albumin:   Albumin   Date Value Ref Range Status    12/16/2020 3.3 (L) 3.5 - 5.2 g/dL Final     Protein:   Total Protein   Date Value Ref Range Status   12/16/2020 6.4 6.0 - 8.4 g/dL Final     Lactic acid:   No results found for: LACTATE    Significant Imaging: I have reviewed all pertinent imaging results/findings within the past 24 hours.   Discussed patient with DR Pineda and in ICU rounds

## 2020-12-17 NOTE — PLAN OF CARE
12/17/20 1327   Discharge Assessment   Assessment Type Discharge Planning Assessment   Confirmed/corrected address and phone number on facesheet? Yes   Assessment information obtained from? Patient   Prior to hospitilization cognitive status: Alert/Oriented   Prior to hospitalization functional status: Assistive Equipment   Current cognitive status: Alert/Oriented   Current Functional Status: Assistive Equipment   Facility Arrived From: home   Lives With spouse   Able to Return to Prior Arrangements yes   Is patient able to care for self after discharge? Unable to determine at this time (comments)   Who are your caregiver(s) and their phone number(s)? Hamzah Lucas [phone]   Patient's perception of discharge disposition home or selfcare;home health   Readmission Within the Last 30 Days previous discharge plan unsuccessful   If yes, most recent facility name: [facility]   Patient currently being followed by outpatient case management? No   Patient currently receives any other outside agency services? No   Equipment Currently Used at Home oxygen;nebulizer;walker, rolling   Do you have any problems affording any of your prescribed medications? No   Is the patient taking medications as prescribed? yes   Does the patient have transportation home? Yes   Transportation Anticipated family or friend will provide   Dialysis Name and Scheduled days N/A   Does the patient receive services at the [facility]? No   Discharge Plan A Home with family;Home Health   Discharge Plan B Home with family   DME Needed Upon Discharge  BIPAP   Patient/Family in Agreement with Plan yes   Readmission Questionnaire   At the time of your discharge, did someone talk to you about what your health problems were? Yes   At the time of discharge, did someone talk to you about what to watch out for regarding worsening of your health problem? Yes   At the time of discharge, did someone talk to you about what to do if you experienced  worsening of your health problem? Yes   At the time of discharge, did someone talk to you about which medication to take when you left the hospital and which ones to stop taking? Yes   At the time of discharge, did someone talk to you about when and where to follow up with a doctor after you left the hospital? Yes   How often do you need to have someone help you when you read instructions, pamphlets, or other written material from your doctor or pharmacy? Always   Do you have problems taking your medications as prescribed? No   Do you have any problems affording any of  your prescribed medications? No   Do you have problems obtaining/receiving your medications? No   Does the patient have transportation to healthcare appointments? No   Living Arrangements house   Does the patient have family/friends to help with healtcare needs after discharge? yes   Are you currently feeling confused? No         [id number] - [place] - [address] AT [facility] [address]  [address]  [place] [address]  Phone: [phone] Fax: [phone]

## 2020-12-17 NOTE — SUBJECTIVE & OBJECTIVE
Past Medical History:   Diagnosis Date    Colon polyp 10/3/2013    COPD (chronic obstructive pulmonary disease) with emphysema     DDD (degenerative disc disease), lumbar     GERD (gastroesophageal reflux disease)     Pulmonary nodules     Vitamin B 12 deficiency     bets B12 shots       Past Surgical History:   Procedure Laterality Date    BACK SURGERY      times 2 lumbar    HYSTERECTOMY      complete       Review of patient's allergies indicates:   Allergen Reactions    Hydrocodone Nausea And Vomiting and Other (See Comments)     Other reaction(s): upsets her stomach; severe abdominal cramping    Oxycodone Nausea And Vomiting     Other reaction(s): upsets stomach; severe abdominal cramps       No current facility-administered medications on file prior to encounter.      Current Outpatient Medications on File Prior to Encounter   Medication Sig    albuterol (PROVENTIL/VENTOLIN HFA) 90 mcg/actuation inhaler INHALE 2 PUFFS PO Q 4 TO 6 H PRF SOB / WHEEZING    alpha-1proteinase 1,000 mg SolR Inject into the vein.    azelastine (ASTELIN) 137 mcg (0.1 %) nasal spray SPRAY TWICE IN NASAL BID    budesonide-formoterol 160-4.5 mcg (SYMBICORT) 160-4.5 mcg/actuation HFAA INHALE 2 PUFFS BY MOUTH TWICE DAILY    DALIRESP 500 mcg Tab Take 500 mcg by mouth once daily.     estradioL (ESTRACE) 1 MG tablet TAKE 1 TABLET(1 MG) BY MOUTH EVERY DAY    fluticasone (FLONASE) 50 mcg/actuation nasal spray SPRAY TWICE NASAL DAILY    gabapentin (NEURONTIN) 300 MG capsule TK 1 C PO TID    ipratropium (ATROVENT HFA) 17 mcg/actuation inhaler Inhale 2 puffs into the lungs every 6 (six) hours. Rescue    ketorolac (TORADOL) 10 mg tablet Take 1 tablet (10 mg total) by mouth every 6 (six) hours.    lidocaine (LIDODERM) 5 % Place 1 patch onto the skin once daily. Remove & Discard patch within 12 hours or as directed by MD    meloxicam (MOBIC) 7.5 MG tablet Take 1 tablet (7.5 mg total) by mouth once daily.    mirtazapine (REMERON)  15 MG tablet TAKE 1 TABLET(15 MG) BY MOUTH EVERY EVENING    montelukast (SINGULAIR) 10 mg tablet TAKE 1 TABLET BY MOUTH EVERY EVENING    nicotine polacrilex 2 MG Lozg Take 1 lozenge (2 mg total) by mouth as needed.    paroxetine (PAXIL) 20 MG tablet Take 1 tablet (20 mg total) by mouth once daily.    predniSONE (DELTASONE) 5 MG tablet Take 1 tablet (5 mg total) by mouth once daily.    PROAIR HFA 90 mcg/actuation inhaler INHALE 2 PUFFS PO Q 4 TO 6 H PRF SOB / WHEEZING    SPIRIVA RESPIMAT 2.5 mcg/actuation Mist INL 2 PUFFS PO D    tramadol (ULTRAM) 50 mg tablet TK 1 TO 2 TS PO Q 4 TO 6 H PRN P     Family History     Problem Relation (Age of Onset)    Breast cancer Sister    Cancer Mother, Sister    Heart disease Father, Brother    Ovarian cancer Mother        Tobacco Use    Smoking status: Current Every Day Smoker     Packs/day: 1.00     Years: 35.00     Pack years: 35.00     Types: Cigarettes     Start date: 5/22/1976    Smokeless tobacco: Current User   Substance and Sexual Activity    Alcohol use: Yes     Comment: occasional    Drug use: No    Sexual activity: Yes     Partners: Male     Birth control/protection: Surgical     Review of Systems   Constitutional: Positive for activity change and appetite change.   HENT: Negative for congestion and drooling.    Eyes: Negative for discharge and itching.   Respiratory: Positive for cough, shortness of breath and wheezing. Negative for apnea and chest tightness.    Cardiovascular: Negative for chest pain and leg swelling.   Gastrointestinal: Negative for abdominal distention and abdominal pain.   Endocrine: Negative for cold intolerance and heat intolerance.   Genitourinary: Negative for difficulty urinating and dyspareunia.   Musculoskeletal: Negative for arthralgias and back pain.   Skin: Negative for color change and pallor.   Allergic/Immunologic: Negative for environmental allergies and food allergies.   Neurological: Negative for dizziness and headaches.    Hematological: Negative for adenopathy. Does not bruise/bleed easily.   Psychiatric/Behavioral: Negative for agitation and behavioral problems.     Objective:     Vital Signs (Most Recent):  Temp: 97.5 °F (36.4 °C) (12/17/20 0715)  Pulse: 96 (12/17/20 0900)  Resp: (!) 24 (12/17/20 0900)  BP: 136/64 (12/17/20 0900)  SpO2: 99 % (12/17/20 0900) Vital Signs (24h Range):  Temp:  [97.5 °F (36.4 °C)-98.4 °F (36.9 °C)] 97.5 °F (36.4 °C)  Pulse:  [] 96  Resp:  [14-45] 24  SpO2:  [76 %-100 %] 99 %  BP: (109-137)/(55-69) 136/64     Weight: 37.4 kg (82 lb 7.2 oz)  Body mass index is 14.61 kg/m².    Physical Exam  HENT:      Head: Normocephalic.      Nose: Nose normal.   Eyes:      Extraocular Movements: Extraocular movements intact.      Pupils: Pupils are equal, round, and reactive to light.   Neck:      Musculoskeletal: Normal range of motion and neck supple.   Cardiovascular:      Rate and Rhythm: Normal rate and regular rhythm.      Pulses: Normal pulses.   Pulmonary:      Breath sounds: Wheezing present.      Comments: Diminished breath sound,all lung fields  Abdominal:      General: Abdomen is flat.      Palpations: Abdomen is soft.   Musculoskeletal: Normal range of motion.         General: No swelling, tenderness, deformity or signs of injury.   Skin:     General: Skin is warm and dry.      Coloration: Skin is not jaundiced or pale.   Neurological:      General: No focal deficit present.      Mental Status: She is alert and oriented to person, place, and time.   Psychiatric:         Mood and Affect: Mood normal.         Behavior: Behavior normal.           CRANIAL NERVES     CN III, IV, VI   Pupils are equal, round, and reactive to light.       Significant Labs:   BMP:   Recent Labs   Lab 12/16/20  0458   *      K 4.3   CL 96   CO2 41*   BUN 14   CREATININE 0.6   CALCIUM 9.9   MG 1.9     CBC:   Recent Labs   Lab 12/16/20  0458   WBC 9.64   HGB 12.8   HCT 42.2        CMP:   Recent Labs   Lab  12/16/20  0458      K 4.3   CL 96   CO2 41*   *   BUN 14   CREATININE 0.6   CALCIUM 9.9   PROT 6.4   ALBUMIN 3.3*   BILITOT 0.2   ALKPHOS 75   AST 14   ALT 10   ANIONGAP 8   EGFRNONAA >60       Significant Imaging: reviewed.

## 2020-12-17 NOTE — ASSESSMENT & PLAN NOTE
Duo to COPD exacerbation,ABG show worsening hypercapnia with hypoxia with PH of  7.3,PCO 2 of  91 and PO 2 of 65,she  required continues BiPAP.her rapid covid is negative,she was given IV Abx as well,at this time,she is comfortable on Bipap.her ABG overall improved,she is chronic CO 2 retainer,her baseline appear to be CO 2 about 80,she is awake time 3,switched continues bipap to nightly   bipap,ordered Bipap,consulted SW.consulted PT,OT,need bipap  before discharged.

## 2020-12-17 NOTE — NURSING
Patient alert and oriented. Patient lying quietly in bed with no complaint and no distress noted. Spouse at bedside.

## 2020-12-17 NOTE — NURSING
[facility]  ICU Shift Summary  Date: 12/16/2020      COVID Test: (--)  Isolation: No active isolations     Prehospitalization: Home  Admit Date / LOS : 12/16/2020/ 0 days    Diagnosis: Acute on chronic respiratory failure with hypoxia and hypercapnia    Consults:        Active: Palliative       Needed: N/A     Code Status: Prior   Advanced Directive: <no information>    LDA: BIPAP and PIV       Central Lines/Site/Justification:Patient Does Not Have Central Line       Urinary Cath/Order/Justification:Patient Does Not Have Urinary Catheter    Vasopressors/Infusions:        GOALS: Volume/ Hemodynamic: N/A                     RASS: 0  alert and calm    Pain Management: PO       Pain Controlled: yes     Rhythm: NSR and ST    Respiratory Device: Nasal Cannula and Bipap    Oxygen Concentration (%):  [35] 35             Most Recent SBT/ SAT: Does not meet criteria       MOVE Screen: PASS    VTE Prophylaxis: Pharm  Mobility: Bedrest  Stress Ulcer Prophylaxis: Yes    Dietary: PO  Tolerance: yes  /  Advancement: yes    I & O (24h):    Intake/Output Summary (Last 24 hours) at 12/16/2020 1813  Last data filed at 12/16/2020 1000  Gross per 24 hour   Intake 300 ml   Output --   Net 300 ml        Restraints: No    Significant Dates:  Post Op Date: N/A  Rescue Date: N/A  Imaging/ Diagnostics: N/A    Noteworthy Labs:  none    CBC/Anemia Labs: Coags:    Recent Labs   Lab 12/16/20 0458   WBC 9.64   HGB 12.8   HCT 42.2      MCV 99*   RDW 12.8    No results for input(s): PT, INR, APTT in the last 168 hours.     Chemistries:   Recent Labs   Lab 12/16/20  0458      K 4.3   CL 96   CO2 41*   BUN 14   CREATININE 0.6   CALCIUM 9.9   PROT 6.4   BILITOT 0.2   ALKPHOS 75   ALT 10   AST 14   MG 1.9        Cardiac Enzymes: Ejection Fractions:    Recent Labs     12/16/20 0458   TROPONINI <0.006    EF   Date Value Ref Range Status   05/22/2015 55 55 - 65         POCT Glucose: HbA1c:    No results for input(s):  POCTGLUCOSE in the last 168 hours. Hemoglobin A1C   Date Value Ref Range Status   03/27/2019 5.4 4.0 - 5.6 % Final     Comment:     ADA Screening Guidelines:  5.7-6.4%  Consistent with prediabetes  >or=6.5%  Consistent with diabetes  High levels of fetal hemoglobin interfere with the HbA1C  assay. Heterozygous hemoglobin variants (HbS, HgC, etc)do  not significantly interfere with this assay.   However, presence of multiple variants may affect accuracy.             ICU LOS 8h  Level of Care: Critical Care    Shift Summary/Plan for the shift: see POC note

## 2020-12-17 NOTE — CONSULTS
[facility]  Palliative Medicine  Consult Note    Patient Name: Renée Lucas  MRN: [id number]  Admission Date: 12/16/2020  Hospital Length of Stay: 1 days  Code Status: Prior   Attending Provider: Srini Nguyen MD  Consulting Provider: Annel Caban NP  Primary Care Physician: Eric Gaines MD  Principal Problem:Acute on chronic respiratory failure with hypoxia and hypercapnia         Inpatient consult to Palliative Care  Consult performed by: Annel Caban NP  Consult ordered by: Srini Nguyen MD  Reason for consult: [facility]        Assessment/Plan:       Plan:  -continue current treatment  -patient interested in HH PT/OT and CNA  -patient states she is ok with intubation but would never want tracheostomy (spouse present)  -She wants her spouse to be her HPOA       Advance Care Planning   Met with patient and spouse.   [facility]  I engaged the patient in a conversation about advance care planning and we specifically addressed what the goals of care would be moving forward, in light of the patient's change in clinical status. Patient states she is feeling better and ready to go home. We discussed her current clinical condition, ES COPD, transplant appt she recently had and smoking cessation. She states she doesn't want to quit smoking. Spouse at bedside and shaking his head, I explained if she is living with 3 other people who all smoke then it is much harder to stop. Spouse also states she has to gain some weight and she has lost so much recently. Patient reports she eats 3 meals a day but spouse states she has not eaten much over the past 2 weeks.      We did specifically address the patient's likely prognosis, which is poor given her physical condition, symptoms, O2 requirements and hospitalizations. She spends most ot the day in hte bed per spouse and he states he has to change her diaper because she cn no longer get to the bathroom. We explored the patient's values and  preferences for future care. She states she does not have AD but that her spouse would be her decision maker. We discussed her requiring Bipap this time and that the next hospitalization she could require intubation, and how does she feel about this knowing that with her disease she may have a hard time coming off ventilator? She state she is ok with intubation but would not want a tracheostomy. Patient not ready to make a decision about CPR. Although it is not be recommened due to anticipated poor outcome.  We talked about her expectations and she wants to get stronger and is interested in HH. PT/OT ordered. Bipap will be ordered for home use too. She states the Bipap is so tight and I explained the importance of using or she will be right back in hospital.  Addressed all questions.     20 min in ACP   Updated MD  Thank you for your consult.       Addendum to PE:  Breathing effort- labored, lung sounds decreased throughout  Cardiac: tachycardia  Abdomen: soft, flat  Musculoskeletal: no swelling in BLEs     Subjective:     HPI:   Principal Problem:Acute on chronic respiratory failure with hypoxia and hypercapnia           HPI:  60 years old patient with history  of end stage COPD,on home oxygen,tobacco abuse,still smoke,came with CC  of shortness of breath that is been worsening since yesterday.  Patient has longstanding history of COPD.  She has a history of chronic respiratory failure.  She is on 2 L nasal cannula at all times.  EMS reports that patient was dyspneic on arrival.  Her O2 sats were in the 70s on 2 L. she took 2 albuterol treatments prior to EMS arrival.  EMS reports that patient's oxygen saturation improved when she is placed on their nasal cannula.  She has a cough that is unchanged.  She denies fever.  She denies chest pain.  No back pain.  No abdominal pain.  No reported nausea, vomiting, or diarrhea.  No leg swelling or calf pain.  Patient was admitted earlier this year for COPD exacerbation.ABG  show worsening hypercapnia with hypoxia with PH of  7.3,PCO 2 of  91 and PO 2 of 65,she  required continues BiPAP.her rapid covid is negative,she was given IV Abx as well,at this time,she is comfortable on Bipap.    Hospital Course:  No notes on file    Interval History: patient transferred to floor. She is SOB at rest but states she feels better.     Past Medical History:   Diagnosis Date    Colon polyp 10/3/2013    COPD (chronic obstructive pulmonary disease) with emphysema     DDD (degenerative disc disease), lumbar     GERD (gastroesophageal reflux disease)     Pulmonary nodules     Vitamin B 12 deficiency     bets B12 shots       Past Surgical History:   Procedure Laterality Date    BACK SURGERY      times 2 lumbar    HYSTERECTOMY      complete       Review of patient's allergies indicates:   Allergen Reactions    Hydrocodone Nausea And Vomiting and Other (See Comments)     Other reaction(s): upsets her stomach; severe abdominal cramping    Oxycodone Nausea And Vomiting     Other reaction(s): upsets stomach; severe abdominal cramps       Medications:  Continuous Infusions:  Scheduled Meds:   doxycycline  100 mg Oral Q12H    enoxaparin  40 mg Subcutaneous Q24H    famotidine  20 mg Oral BID    levalbuterol  1.25 mg Nebulization Q8H    methylPREDNISolone sodium succinate  80 mg Intravenous Q8H    mirtazapine  15 mg Oral Nightly    montelukast  10 mg Oral Daily    paroxetine  10 mg Oral Daily     PRN Meds:gabapentin, traMADoL    Family History     Problem Relation (Age of Onset)    Breast cancer Sister    Cancer Mother, Sister    Heart disease Father, Brother    Ovarian cancer Mother        Tobacco Use    Smoking status: Current Every Day Smoker     Packs/day: 1.00     Years: 35.00     Pack years: 35.00     Types: Cigarettes     Start date: 5/22/1976    Smokeless tobacco: Current User   Substance and Sexual Activity    Alcohol use: Yes     Comment: occasional    Drug use: No    Sexual  activity: Yes     Partners: Male     Birth control/protection: Surgical       Review of Systems   Constitutional: Positive for activity change and appetite change.   Respiratory: Positive for shortness of breath.      Objective:     Vital Signs (Most Recent):  Temp: 97.5 °F (36.4 °C) (12/17/20 0715)  Pulse: 98 (12/17/20 1100)  Resp: (!) 24 (12/17/20 1100)  BP: 124/63 (12/17/20 1100)  SpO2: 96 % (12/17/20 1100) Vital Signs (24h Range):  Temp:  [97.5 °F (36.4 °C)-98.1 °F (36.7 °C)] 97.5 °F (36.4 °C)  Pulse:  [] 98  Resp:  [14-45] 24  SpO2:  [76 %-100 %] 96 %  BP: (109-137)/(58-65) 124/63     Weight: 37.4 kg (82 lb 7.2 oz)  Body mass index is 14.61 kg/m².    Physical Exam  Vitals signs and nursing note reviewed.   Constitutional:       General: She is not in acute distress.     Appearance: She is cachectic. She is ill-appearing. She is not toxic-appearing.      Interventions: Nasal cannula in place.   HENT:      Nose: Nose normal.      Mouth/Throat:      Mouth: Mucous membranes are dry.   Neurological:      Mental Status: She is alert.         Review of Symptoms    Symptom Assessment (ESAS 0-10 Scale)  Pain:  0  Dyspnea:  7  Anxiety:  0  Nausea:  0  Depression:  0  Anorexia:  0  Fatigue:  0  Insomnia:  0  Restlessness:  0  Agitation:  0     CAM / Delirium:  Negative  Constipation:  Negative  Diarrhea:  Negative          Performance Status:  40    Living Arrangements:  Lives with family    Psychosocial/Cultural: Patient lives with spouse, daughter, son in law and grandchild. Patient reports everyone in house smokes so its hard for her to even try to stop.       Advance Care Planning   Advance Directives:   Living Will: No    LaPOST: No    Do Not Resuscitate Status: No    Medical Power of : No           Significant Labs: All pertinent labs within the past 24 hours have been reviewed.  CBC:   Recent Labs   Lab 12/16/20  0458   WBC 9.64   HGB 12.8   HCT 42.2   MCV 99*        BMP:  No results for  input(s): GLU, NA, K, CL, CO2, BUN, CREATININE, CALCIUM, MG in the last 24 hours.  LFT:  Lab Results   Component Value Date    AST 14 12/16/2020    ALKPHOS 75 12/16/2020    BILITOT 0.2 12/16/2020     Albumin:   Albumin   Date Value Ref Range Status   12/16/2020 3.3 (L) 3.5 - 5.2 g/dL Final     Protein:   Total Protein   Date Value Ref Range Status   12/16/2020 6.4 6.0 - 8.4 g/dL Final     Lactic acid:   No results found for: LACTATE    Significant Imaging: I have reviewed all pertinent imaging results/findings within the past 24 hours.   Discussed patient with DR Pineda and in ICU rounds          > 50% of 60 min visit spent in chart review, face to face discussion of goals of care,  symptom assessment, coordination of care and emotional support.    Annel Caban, NP  Palliative Medicine  [facility]

## 2020-12-17 NOTE — CARE UPDATE
[facility]  ICU Shift Summary  Date: 12/17/2020      COVID Test: (--)  Isolation: No active isolations     Prehospitalization: Home  Admit Date / LOS : 12/16/2020/ 1 days    Diagnosis: Acute on chronic respiratory failure with hypoxia and hypercapnia    Consults:        Active: Palliative, SW and Spiritual Care       Needed: N/A     Code Status: Prior   Advanced Directive: <no information>    LDA: PIV       Central Lines/Site/Justification:Patient Does Not Have Central Line       Urinary Cath/Order/Justification:Patient Does Not Have Urinary Catheter    Vasopressors/Infusions:        GOALS: Volume/ Hemodynamic: N/A                     RASS: N/A    Pain Management: none       Pain Controlled: no     Rhythm: ST    Respiratory Device: Bipap    Oxygen Concentration (%):  [35] 35             Most Recent SBT/ SAT: N/A       MOVE Screen: PASS    VTE Prophylaxis: Ambulation  Mobility: Bedrest  Stress Ulcer Prophylaxis: Yes    Dietary: PO  Tolerance: yes  /  Advancement: yes    I & O (24h):    Intake/Output Summary (Last 24 hours) at 12/17/2020 0531  Last data filed at 12/16/2020 1000  Gross per 24 hour   Intake 300 ml   Output --   Net 300 ml        Restraints: No    Significant Dates:  Post Op Date: N/A  Rescue Date: N/A  Imaging/ Diagnostics: N/A    Noteworthy Labs:  none    CBC/Anemia Labs: Coags:    Recent Labs   Lab 12/16/20 0458   WBC 9.64   HGB 12.8   HCT 42.2      MCV 99*   RDW 12.8    No results for input(s): PT, INR, APTT in the last 168 hours.     Chemistries:   Recent Labs   Lab 12/16/20 0458      K 4.3   CL 96   CO2 41*   BUN 14   CREATININE 0.6   CALCIUM 9.9   PROT 6.4   BILITOT 0.2   ALKPHOS 75   ALT 10   AST 14   MG 1.9        Cardiac Enzymes: Ejection Fractions:    Recent Labs     12/16/20 0458   TROPONINI <0.006    EF   Date Value Ref Range Status   05/22/2015 55 55 - 65         POCT Glucose: HbA1c:    No results for input(s): POCTGLUCOSE in the last 168 hours. Hemoglobin  A1C   Date Value Ref Range Status   03/27/2019 5.4 4.0 - 5.6 % Final     Comment:     ADA Screening Guidelines:  5.7-6.4%  Consistent with prediabetes  >or=6.5%  Consistent with diabetes  High levels of fetal hemoglobin interfere with the HbA1C  assay. Heterozygous hemoglobin variants (HbS, HgC, etc)do  not significantly interfere with this assay.   However, presence of multiple variants may affect accuracy.             ICU LOS 19h  Level of Care: Critical Care    Shift Summary/Plan for the shift: none

## 2020-12-18 VITALS
OXYGEN SATURATION: 97 % | HEIGHT: 63 IN | SYSTOLIC BLOOD PRESSURE: 124 MMHG | RESPIRATION RATE: 16 BRPM | WEIGHT: 73.88 LBS | HEART RATE: 79 BPM | TEMPERATURE: 99 F | BODY MASS INDEX: 13.09 KG/M2 | DIASTOLIC BLOOD PRESSURE: 68 MMHG

## 2020-12-18 LAB
ALBUMIN SERPL BCP-MCNC: 3.2 G/DL (ref 3.5–5.2)
ALP SERPL-CCNC: 55 U/L (ref 55–135)
ALT SERPL W/O P-5'-P-CCNC: 11 U/L (ref 10–44)
ANION GAP SERPL CALC-SCNC: 6 MMOL/L (ref 8–16)
AST SERPL-CCNC: 10 U/L (ref 10–40)
BASOPHILS # BLD AUTO: 0.01 K/UL (ref 0–0.2)
BASOPHILS NFR BLD: 0.1 % (ref 0–1.9)
BILIRUB SERPL-MCNC: 0.3 MG/DL (ref 0.1–1)
BUN SERPL-MCNC: 22 MG/DL (ref 6–20)
CALCIUM SERPL-MCNC: 9.3 MG/DL (ref 8.7–10.5)
CHLORIDE SERPL-SCNC: 95 MMOL/L (ref 95–110)
CO2 SERPL-SCNC: 43 MMOL/L (ref 23–29)
CREAT SERPL-MCNC: 0.6 MG/DL (ref 0.5–1.4)
DIFFERENTIAL METHOD: ABNORMAL
EOSINOPHIL # BLD AUTO: 0 K/UL (ref 0–0.5)
EOSINOPHIL NFR BLD: 0 % (ref 0–8)
ERYTHROCYTE [DISTWIDTH] IN BLOOD BY AUTOMATED COUNT: 12.7 % (ref 11.5–14.5)
EST. GFR  (AFRICAN AMERICAN): >60 ML/MIN/1.73 M^2
EST. GFR  (NON AFRICAN AMERICAN): >60 ML/MIN/1.73 M^2
GLUCOSE SERPL-MCNC: 136 MG/DL (ref 70–110)
HCT VFR BLD AUTO: 36.5 % (ref 37–48.5)
HGB BLD-MCNC: 11.2 G/DL (ref 12–16)
IMM GRANULOCYTES # BLD AUTO: 0.05 K/UL (ref 0–0.04)
IMM GRANULOCYTES NFR BLD AUTO: 0.6 % (ref 0–0.5)
LYMPHOCYTES # BLD AUTO: 0.5 K/UL (ref 1–4.8)
LYMPHOCYTES NFR BLD: 6.4 % (ref 18–48)
MCH RBC QN AUTO: 29.3 PG (ref 27–31)
MCHC RBC AUTO-ENTMCNC: 30.7 G/DL (ref 32–36)
MCV RBC AUTO: 96 FL (ref 82–98)
MONOCYTES # BLD AUTO: 0.2 K/UL (ref 0.3–1)
MONOCYTES NFR BLD: 2.2 % (ref 4–15)
NEUTROPHILS # BLD AUTO: 7 K/UL (ref 1.8–7.7)
NEUTROPHILS NFR BLD: 90.7 % (ref 38–73)
NRBC BLD-RTO: 0 /100 WBC
PLATELET # BLD AUTO: 260 K/UL (ref 150–350)
PMV BLD AUTO: 11.9 FL (ref 9.2–12.9)
POTASSIUM SERPL-SCNC: 4.7 MMOL/L (ref 3.5–5.1)
PROT SERPL-MCNC: 5.9 G/DL (ref 6–8.4)
RBC # BLD AUTO: 3.82 M/UL (ref 4–5.4)
SODIUM SERPL-SCNC: 144 MMOL/L (ref 136–145)
WBC # BLD AUTO: 7.76 K/UL (ref 3.9–12.7)

## 2020-12-18 PROCEDURE — 97161 PT EVAL LOW COMPLEX 20 MIN: CPT

## 2020-12-18 PROCEDURE — 97165 OT EVAL LOW COMPLEX 30 MIN: CPT

## 2020-12-18 PROCEDURE — 63600175 PHARM REV CODE 636 W HCPCS: Performed by: HOSPITALIST

## 2020-12-18 PROCEDURE — 94640 AIRWAY INHALATION TREATMENT: CPT

## 2020-12-18 PROCEDURE — 36415 COLL VENOUS BLD VENIPUNCTURE: CPT

## 2020-12-18 PROCEDURE — 99900035 HC TECH TIME PER 15 MIN (STAT)

## 2020-12-18 PROCEDURE — 25000003 PHARM REV CODE 250: Performed by: HOSPITALIST

## 2020-12-18 PROCEDURE — 25000242 PHARM REV CODE 250 ALT 637 W/ HCPCS: Performed by: HOSPITALIST

## 2020-12-18 PROCEDURE — S4991 NICOTINE PATCH NONLEGEND: HCPCS | Performed by: HOSPITALIST

## 2020-12-18 PROCEDURE — 94761 N-INVAS EAR/PLS OXIMETRY MLT: CPT

## 2020-12-18 PROCEDURE — 27000221 HC OXYGEN, UP TO 24 HOURS

## 2020-12-18 PROCEDURE — 85025 COMPLETE CBC W/AUTO DIFF WBC: CPT

## 2020-12-18 PROCEDURE — 80053 COMPREHEN METABOLIC PANEL: CPT

## 2020-12-18 PROCEDURE — 94660 CPAP INITIATION&MGMT: CPT

## 2020-12-18 RX ORDER — IBUPROFEN 200 MG
1 TABLET ORAL DAILY
Status: DISCONTINUED | OUTPATIENT
Start: 2020-12-18 | End: 2020-12-18 | Stop reason: HOSPADM

## 2020-12-18 RX ORDER — PREDNISONE 5 MG/1
TABLET ORAL
Qty: 114 TABLET | Refills: 0 | Status: SHIPPED | OUTPATIENT
Start: 2020-12-18

## 2020-12-18 RX ORDER — ACETAMINOPHEN 325 MG/1
650 TABLET ORAL EVERY 6 HOURS PRN
Status: DISCONTINUED | OUTPATIENT
Start: 2020-12-18 | End: 2020-12-18 | Stop reason: HOSPADM

## 2020-12-18 RX ORDER — DOXYCYCLINE HYCLATE 100 MG
100 TABLET ORAL EVERY 12 HOURS
Qty: 6 TABLET | Refills: 0 | Status: SHIPPED | OUTPATIENT
Start: 2020-12-18 | End: 2020-12-21

## 2020-12-18 RX ORDER — ONDANSETRON 2 MG/ML
4 INJECTION INTRAMUSCULAR; INTRAVENOUS EVERY 4 HOURS PRN
Status: DISCONTINUED | OUTPATIENT
Start: 2020-12-18 | End: 2020-12-18 | Stop reason: HOSPADM

## 2020-12-18 RX ADMIN — NICOTINE 1 PATCH: 14 PATCH TRANSDERMAL at 09:12

## 2020-12-18 RX ADMIN — METHYLPREDNISOLONE SODIUM SUCCINATE 80 MG: 40 INJECTION, POWDER, FOR SOLUTION INTRAMUSCULAR; INTRAVENOUS at 05:12

## 2020-12-18 RX ADMIN — FAMOTIDINE 20 MG: 20 TABLET, FILM COATED ORAL at 09:12

## 2020-12-18 RX ADMIN — DOXYCYCLINE HYCLATE 100 MG: 100 TABLET, COATED ORAL at 09:12

## 2020-12-18 RX ADMIN — MONTELUKAST 10 MG: 10 TABLET, FILM COATED ORAL at 09:12

## 2020-12-18 RX ADMIN — LEVALBUTEROL 1.25 MG: 1.25 SOLUTION, CONCENTRATE RESPIRATORY (INHALATION) at 07:12

## 2020-12-18 RX ADMIN — PAROXETINE HYDROCHLORIDE 10 MG: 10 SUSPENSION ORAL at 10:12

## 2020-12-18 RX ADMIN — LEVALBUTEROL 1.25 MG: 1.25 SOLUTION, CONCENTRATE RESPIRATORY (INHALATION) at 12:12

## 2020-12-18 RX ADMIN — METHYLPREDNISOLONE SODIUM SUCCINATE 40 MG: 40 INJECTION, POWDER, FOR SOLUTION INTRAMUSCULAR; INTRAVENOUS at 04:12

## 2020-12-18 NOTE — CONSULTS
BIPAP order pending order clarifications and authorization. Per Dr. Gonzalez, patient has good social support and has declined home health

## 2020-12-18 NOTE — PLAN OF CARE
"  Problem: Occupational Therapy Goal  Goal: Occupational Therapy Goal  Outcome: Met     Pt very pleasant and willing to participate in eval this date. Pt performed supine>sit w/ SBA and performed sit>stand w/ SBA and use of bed rail for stability. Pt reports she has been "bedbound" for over a year and at most, she t/f's to her transport chair using step t/f technique w/ assist. Pt relies on assistance all ADLs from supportive  and her goal is to d/c home to [facility]. Pt w/ reports she would be interested in HH; however, per chart, pt has declined. OT to recommend a BSC and tub bench for safe ADL performance.   "

## 2020-12-18 NOTE — NURSING
Discharge instructions given to patient prior to discharge. Patient spouse picked up scripts from [facility]. Patient states understanding of all information provided. IV and tele discontinued. Tolerated well. All personal belongings with patient at the time of discharge. Transported to personal vehicle via hospital transport wheelchair.

## 2020-12-18 NOTE — DISCHARGE SUMMARY
[facility] Hospital Medicine  Discharge Summary      Patient Name: Renée Lucas  MRN: [id number]  Patient Class: IP- Inpatient  Admission Date: 12/16/2020  Hospital Length of Stay: 2 days  Discharge Date and Time:  12/18/2020 3:25 PM  Attending Physician: Sherman Gonzalez MD   Discharging Provider: Sherman Gonzalez MD  Primary Care Provider: Eric Gaines MD      HPI:   60 years old patient with history  of end stage COPD,on home oxygen,tobacco abuse,still smoke,came with CC  of shortness of breath that is been worsening since yesterday.  Patient has longstanding history of COPD.  She has a history of chronic respiratory failure.  She is on 2 L nasal cannula at all times.  EMS reports that patient was dyspneic on arrival.  Her O2 sats were in the 70s on 2 L. she took 2 albuterol treatments prior to EMS arrival.  EMS reports that patient's oxygen saturation improved when she is placed on their nasal cannula.  She has a cough that is unchanged.  She denies fever.  She denies chest pain.  No back pain.  No abdominal pain.  No reported nausea, vomiting, or diarrhea.  No leg swelling or calf pain.  Patient was admitted earlier this year for COPD exacerbation.ABG show worsening hypercapnia with hypoxia with PH of  7.3,PCO 2 of  91 and PO 2 of 65,she  required continues BiPAP.her rapid covid is negative,she was given IV Abx as well,at this time,she is comfortable on Bipap.    Consults:   Consults (From admission, onward)        Status Ordering Provider     Inpatient consult to Palliative Care  Once     Provider:  Annel Caban NP    Completed TAYA CASTLE     Inpatient consult to Social Work  Once     Provider:  (Not yet assigned)    Completed TAYA CASTLE     Inpatient consult to Social Work  Once     Provider:  (Not yet assigned)    Completed SHERMAN GONZALEZ     Inpatient consult to Spiritual Care  Once     Provider:  (Not yet assigned)    Completed TAYA CASTLE      IP consult to case management  Once     Provider:  (Not yet assigned)    Completed [facility] By Problem:   * Acute on chronic respiratory failure with hypoxia and hypercapnia  -Mrs. Lucas was admitted to inpatient status in our ICU  -This was due to exacerbation of her end-stage COPD  -ABG showed worsening hypercapnia with hypoxia with PH of  7.3,PCO2 of  91 and PO 2 of 65  -Rapid covid negative  -She was treated in ICU with bipap, iv steroids, antibiotics and frequent nebulizers and improved rapidly.  -She was stepped down to the floor on 12/17 and today is very eager to go home  -We will continue her home Oxygen and I have ordered a BiPAP for at night.  -She will complete a slow prednisone taper back down to her usual 5mg po daily  -She will continue her home inhaler medications and nebulizers and a short course of doxycycline.  - recommended and offered but patient declined.  -Patient states she is breathing better than normal and is very eager for discharge home today.  -She will need to follow up with Dr. Cox, her pulmonologist within 1 week.    COPD Exacerbation  -Treatment as above.    Tobacco abuse  -Counselled on cessation for 8 minutes  -Unfortunately patient is precontemplative.  She states she has nicotine tablets at home.  I discouraged her from using those while she was actively smoking.    Advance care planning  -Palliative care consulted and input appreciated.  Patient remains full code.    Chronic respiratory failure with hypoxia  -Due to end-stage copd  -Continue treatment and supplemental O2 as above    DDD (degenerative disc disease), lumbar  -Stable  -Continue prn Gabapentin.    GERD (gastroesophageal reflux disease)  -No acute issues in hospitalization  -Treated with famotidine.    Final Active Diagnoses:    Diagnosis Date Noted POA    PRINCIPAL PROBLEM:  Acute on chronic respiratory failure with hypoxia and hypercapnia [J96.21, J96.22] 11/26/2020 Yes     COPD exacerbation [J44.1] 11/26/2020 Yes    Tobacco abuse [Z72.0] 05/24/2015 Yes    Advance care planning [Z71.89] 06/26/2014 Not Applicable    Chronic respiratory failure with hypoxia [J96.11] 12/20/2013 Yes    DDD (degenerative disc disease), lumbar [M51.36]  Yes    GERD (gastroesophageal reflux disease) [K21.9]  Yes      Problems Resolved During this Admission:       Discharged Condition: stable    Disposition: Home or Self Care    Follow Up:  Follow-up Information     Eric Gaines MD. Go on 1/8/2021.    Specialties: Internal Medicine, Wound Care  Why: Primary Care Follow-Up: Friday, January 8, 2020 at 9:20am; Patient placed on wait list   Contact information:  [address] [facility] [address]  [phone]             Shannon Cox MD. Go on 2/9/2021.    Specialty: Pulmonary Disease  Why: Pulmonary Follow-Up: Tuesday, Febuary 9, 2020 at 11:45am; Patient placed on wait list    Contact information:  [address] [place] [address]  [address]  [facility] [address]  [phone]             Call [facility].    Specialty: DME Provider  Why: Medical equipment needs   Contact information:  [address]  [address]  [place] [address]  [phone]                 Patient Instructions:      BIPAP FOR HOME USE     Order Specific Question Answer Comments   Length of need (1-99 months): 99    Type (): BiPap    BiPap setting (cmH20) Inspiratory: 15    BiPap setting (cmH20) Expiratory: 8    Humidification (): Heated    Choose ONE mask type and its corresponding cushions and/or pillows:  Full Face Mask, 1 per 90 days:  Full Face Cushion, (3 per 90 days)    Choose EITHER Heated or Non-Heated Tubjing  Non-Heated Tubing, 1 per 90 days    All other supplies as needed as listed below:  Headgear, 1 per 180 days      BIPAP FOR HOME USE   Order Comments: Please  Provide cushions,chin strap,filters,headgear,humidifier,chamber and tubing,    ICD 10 for COPD J44.9    ICD 10 for respiratory failure  J 96     Order Specific Question Answer Comments   Length of need (1-99 months): 99    Type (): BiPap    BiPap setting (cmH20) Inspiratory: 14    BiPap setting (cmH20) Expiratory: 8    Humidification (): Heated    Choose ONE mask type and its corresponding cushions and/or pillows:  Full Face Mask, 1 per 90 days:  Full Face Cushion, (3 per 90 days)    Choose EITHER Heated or Non-Heated Tubjing  Non-Heated Tubing, 1 per 90 days    All other supplies as needed as listed below:  Headgear, 1 per 180 days      BIPAP FOR HOME USE   Order Comments: For Acute on chronic respiratory failure with hypoxia and hypercapnia  ICD J96.22 and J96.21     Order Specific Question Answer Comments   Length of need (1-99 months): 99    Type (): BiPap    BiPap setting (cmH20) Inspiratory: 15    BiPap setting (cmH20) Expiratory: 5    Humidification (): Heated    Choose ONE mask type and its corresponding cushions and/or pillows:  Full Face Mask, 1 per 90 days:  Full Face Cushion, (3 per 90 days)    Choose EITHER Heated or Non-Heated Tubjing  Heated Tubing, 1 per 6 months    Number of Days Needed: 99    All other supplies as needed as listed below:  Headgear, 1 per 180 days    All other supplies as needed as listed below:  Chin Strap, 1 per 180 days    All other supplies as needed as listed below:  Disposable Filter, 6 per 90 days    All other supplies as needed as listed below:  Humidifier Chamber, 1 per 180 days    All other supplies as needed as listed below:  Exhalation Port, contact payer for quantity/frequency      BIPAP FOR HOME USE   Order Comments: Acute and chronic respiratory failure with hypoxia and hypercapnia (J96.21 and J96.22    Oxygen at FiO2 38% (3LPM)     Order Specific Question Answer Comments   Length of need (1-99 months): 99    Type (): BiPap    BiPap setting (cmH20) Inspiratory: 15    BiPap setting (cmH20) Expiratory: 5     Humidification (): Heated    Choose ONE mask type and its corresponding cushions and/or pillows:  Full Face Mask, 1 per 90 days:  Full Face Cushion, (3 per 90 days)    Choose EITHER Heated or Non-Heated Tubjing  Non-Heated Tubing, 1 per 90 days    Number of Days Needed: 99    All other supplies as needed as listed below:  Headgear, 1 per 180 days    All other supplies as needed as listed below:  Disposable Filter, 6 per 90 days    All other supplies as needed as listed below:  Humidifier Chamber, 1 per 180 days      Diet Adult Regular     Notify your health care provider if you experience any of the following:  increased confusion or weakness     Notify your health care provider if you experience any of the following:  persistent dizziness, light-headedness, or visual disturbances     Notify your health care provider if you experience any of the following:  worsening rash     Notify your health care provider if you experience any of the following:  severe persistent headache     Notify your health care provider if you experience any of the following:  difficulty breathing or increased cough     Notify your health care provider if you experience any of the following:  severe uncontrolled pain     Notify your health care provider if you experience any of the following:  persistent nausea and vomiting or diarrhea     Notify your health care provider if you experience any of the following:  temperature >100.4     Activity as tolerated       Significant Diagnostic Studies: Labs:   BMP:   Recent Labs   Lab 12/18/20  0629   *      K 4.7   CL 95   CO2 43*   BUN 22*   CREATININE 0.6   CALCIUM 9.3   , CMP   Recent Labs   Lab 12/18/20  0629      K 4.7   CL 95   CO2 43*   *   BUN 22*   CREATININE 0.6   CALCIUM 9.3   PROT 5.9*   ALBUMIN 3.2*   BILITOT 0.3   ALKPHOS 55   AST 10   ALT 11   ANIONGAP 6*   ESTGFRAFRICA >60   EGFRNONAA >60   , CBC   Recent Labs   Lab  12/18/20  0629   WBC 7.76   HGB 11.2*   HCT 36.5*      , INR No results found for: INR, PROTIME, Lipid Panel   Lab Results   Component Value Date    CHOL 221 (H) 05/07/2016    HDL 93 (H) 05/07/2016    LDLCALC 114.4 05/07/2016    TRIG 68 05/07/2016    CHOLHDL 42.1 05/07/2016    and Troponin   Recent Labs   Lab 12/16/20  0458   TROPONINI <0.006       Pending Diagnostic Studies:     None         Medications:  Reconciled Home Medications:      Medication List      START taking these medications    doxycycline 100 MG tablet  Commonly known as: VIBRA-TABS  Take 1 tablet (100 mg total) by mouth every 12 (twelve) hours. for 3 days        CHANGE how you take these medications    predniSONE 5 MG tablet  Commonly known as: DELTASONE  Take 8 tabs by mouth daily x3days, then 7 tabs daily x3days, then 6 tabs daily x3days, then 5 tabs daily x3days, then 4 tabs daily x3days, then 3 tabs daily x3days, then 2 tabs daily x3days, then 1 tab daily as directed  What changed:   · how much to take  · how to take this  · when to take this  · additional instructions        CONTINUE taking these medications    alpha-1proteinase 1,000 mg Solr  Inject into the vein.     azelastine 137 mcg (0.1 %) nasal spray  Commonly known as: ASTELIN  SPRAY TWICE IN NASAL BID     DALIRESP 500 mcg Tab  Generic drug: roflumilast  Take 500 mcg by mouth once daily.     estradioL 1 MG tablet  Commonly known as: ESTRACE  TAKE 1 TABLET(1 MG) BY MOUTH EVERY DAY     fluticasone propionate 50 mcg/actuation nasal spray  Commonly known as: FLONASE  SPRAY TWICE NASAL DAILY     gabapentin 300 MG capsule  Commonly known as: NEURONTIN  TK 1 C PO TID     ipratropium 17 mcg/actuation inhaler  Commonly known as: ATROVENT HFA  Inhale 2 puffs into the lungs every 6 (six) hours. Rescue     lidocaine 5 %  Commonly known as: LIDODERM  Place 1 patch onto the skin once daily. Remove & Discard patch within 12 hours or as directed by MD     meloxicam 7.5 MG tablet  Commonly known  as: MOBIC  Take 1 tablet (7.5 mg total) by mouth once daily.     mirtazapine 15 MG tablet  Commonly known as: REMERON  TAKE 1 TABLET(15 MG) BY MOUTH EVERY EVENING     montelukast 10 mg tablet  Commonly known as: SINGULAIR  TAKE 1 TABLET BY MOUTH EVERY EVENING     nicotine polacrilex 2 MG Lozg  Take 1 lozenge (2 mg total) by mouth as needed.     paroxetine 20 MG tablet  Commonly known as: PAXIL  Take 1 tablet (20 mg total) by mouth once daily.     * PROAIR HFA 90 mcg/actuation inhaler  Generic drug: albuterol  INHALE 2 PUFFS PO Q 4 TO 6 H PRF SOB / WHEEZING     * albuterol 90 mcg/actuation inhaler  Commonly known as: PROVENTIL/VENTOLIN HFA  INHALE 2 PUFFS PO Q 4 TO 6 H PRF SOB / WHEEZING     SPIRIVA RESPIMAT 2.5 mcg/actuation inhaler  Generic drug: tiotropium bromide  INL 2 PUFFS PO D     SYMBICORT 160-4.5 mcg/actuation Hfaa  Generic drug: budesonide-formoterol 160-4.5 mcg  INHALE 2 PUFFS BY MOUTH TWICE DAILY     traMADoL 50 mg tablet  Commonly known as: ULTRAM  TK 1 TO 2 TS PO Q 4 TO 6 H PRN P         * This list has 2 medication(s) that are the same as other medications prescribed for you. Read the directions carefully, and ask your doctor or other care provider to review them with you.            STOP taking these medications    ketorolac 10 mg tablet  Commonly known as: TORADOL            Indwelling Lines/Drains at time of discharge:   Lines/Drains/Airways     None                 Time spent on the discharge of patient: 35 minutes  Patient was seen and examined on the date of discharge and determined to be suitable for discharge.         Fred Gonzalez MD  Department of Hospital Medicine  [facility]

## 2020-12-18 NOTE — PLAN OF CARE
WRITTEN HEALTHCARE DISCHARGE INFORMATION     Things that YOU are RESPONSIBLE for to Manage Your Care At Home:    1. Getting your prescriptions filled.  2. Taking you medications as directed. DO NOT MISS ANY DOSES!  3. Going to your follow-up doctor appointments. This is important because it allows the doctor to monitor your progress and to determine if any changes need to be made to your treatment plan.    If you are unable to make your follow up appointments, please call the number listed and reschedule this appointment.     ____________HELP AT HOME____________________    Experiencing any SIGNS or SYMPTOMS: YOU CAN:     Schedule a same day appopintment with your Primary Care Doctor or  you can call [facility] On Call Nurse Care Line for 24/7 assistance at [phone]     If you are experience any signs or symptoms that have become severe, Call 911 and come to your nearest Emergency Room.     You should receive a call from [facility] within 48-72 hours to help manage your care after discharge. Please try to make sure that you answer your phone for this important phone call.    Thank you for choosing [facility] and allowing us to care for you.   From your care management team: Lidia Polk [facility], [phone]  Follow-up Information     Eric Gaines MD. Go on 1/8/2021.    Specialties: Internal Medicine, Wound Care  Why: Primary Care Follow-Up: Friday, January 8, 2020 at 9:20am; Patient placed on wait list   Contact information:  [address] [facility] [address]  [phone]             Shannon Cox MD. Go on 2/9/2021.    Specialty: Pulmonary Disease  Why: Pulmonary Follow-Up: Tuesday, Febuary 9, 2020 at 11:45am; Patient placed on wait list    Contact information:  [address] [place] [address]  [address]  [place] [address]  [phone]             Call [facility].    Specialty: DME Provider  Why: Medical equipment needs   Contact information:  [address] [facility]  [address]  [facility]  [id number] [phone]

## 2020-12-18 NOTE — PROGRESS NOTES
Received report from NERY Yap. Patient resting quietly in bed, 3L O2 NC & telemetry monitoring in place, no distress noted. Safety maintained, call light in reach.

## 2020-12-18 NOTE — PT/OT/SLP EVAL
"Occupational Therapy   Evaluation and Discharge Note    Name: Renée Lucas  MRN: [id number]  Admitting Diagnosis:  Acute on chronic respiratory failure with hypoxia and hypercapnia      Recommendations:     Discharge Recommendations: home health OT(Per chart, pt has already declined.)  Discharge Equipment Recommendations:  BSC and TTB  Barriers to discharge:  None    Assessment:     Renée Lucas is a 60 y.o. female with a medical diagnosis of Acute on chronic respiratory failure with hypoxia and hypercapnia. At this time, patient is functioning at their prior level of function and does not require further acute OT services.   Pt very pleasant and willing to participate in eval this date. Pt performed supine>sit w/ SBA and performed sit>stand w/ SBA and use of bed rail for stability. Pt reports she has been "bedbound" for over a year and at most, she t/f's to her transport chair using step t/f technique w/ assist. Pt relies on assistance all ADLs from supportive  and her goal is to d/c home to [facility]. Pt w/ reports she would be interested in HH; however, per chart, pt has declined. OT to recommend a BSC and tub bench for safe ADL performance.     Plan:     During this hospitalization, patient does not require further acute OT services.  Please re-consult if situation changes.    · Plan of Care Reviewed with: patient    Subjective     Chief Complaint: "I get short winded easily." Pt reports her fatigue prevent her from performing ADLs and functional mobility.   Patient/Family Comments/goals: To return home w/ assist from  and daughter.     Occupational Profile:  Living Environment: Pt lives in [facility] w/ [facility]; pt uses a t/s combo w/o any DME.   Previous level of function: Pt reports she has been "bedbound" for the past year and requires total A for ADLs. Pt reports she is able to t/f to transport chair using step technique.Pt reports for bathing, her son carries her to and from tub; she sits inside of tub " for bathing.   Roles and Routines: Pt is a home dweller.   Equipment Used at home: transport chair; RW    Assistance upon Discharge: Pt will have assistance from  and daughter upon d/c.  works but daughter will be at home to assist.     Pain/Comfort:  · Pain Rating 1: 0/10  · Pain Rating Post-Intervention 1: 0/10    Patients cultural, spiritual, Buddhism conflicts given the current situation: no    Objective:     Communicated with: nurse Yap prior to session.  Patient found HOB elevated with telemetry, oxygen upon OT entry to room.    General Precautions: Standard, respiratory, fall   Orthopedic Precautions:N/A   Braces: N/A     Occupational Performance:    Bed Mobility:    · Patient completed Scooting/Bridging with stand by assistance  · Patient completed Supine to Sit with stand by assistance  · Patient completed Sit to Supine with stand by assistance    Functional Mobility/Transfers:  · Patient completed Sit <> Stand Transfer with stand by assistance  with  no assistive device; pt using posterior knees to brace self on bed; pt using LUE for holding bed rail for stability   · Functional Mobility: Pt stood at EOB and reported feeling dizzy thus, was unable to take lateral steps to HOB.     Activities of Daily Living:  · Upper Body Dressing: minimum assistance for donning gown over back     Cognitive/Visual Perceptual:  Cognitive/Psychosocial Skills:     -       Oriented to: Person, Place, Time and Situation   -       Follows Commands/attention:Follows two-step commands  -       Communication: clear/fluent  -       Memory: No Deficits noted  -       Safety awareness/insight to disability: intact     Physical Exam:  Balance:  Good sitting balance; fair - standing   Postural examination/scapula alignment:    -       Rounded shoulders  -       Forward head  -       Pt very thin   Skin integrity: Visible skin intact  Edema:  None noted  Sensation:    -       Intact  light/touch BUEs  Upper Extremity  Range of Motion:     -       Right Upper Extremity: WFL  -       Left Upper Extremity: WFL  Upper Extremity Strength:    -       Right Upper Extremity: WFL  -       Left Upper Extremity: WFL   Strength:    -       Right Upper Extremity: WFL  -       Left Upper Extremity: WFL    AMPAC 6 Click ADL:  AMPAC Total Score: 17    Treatment & Education:  -Pt educated on role of OT and POC.   -Pt is at baseline is not in need of OT services at this time. Pt w/ initial interest in HHOT, but per chart, pt declined.   -OT to recommend BSC and TTB; pt agreed.   -Pt educated on PLB technique and energy conservation strategies to promote participation on ADLs and functional mobility.   Education:    Patient left HOB elevated with all lines intact and call button in reach    GOALS:   Multidisciplinary Problems     Occupational Therapy Goals     Not on file          Multidisciplinary Problems (Resolved)        Problem: Occupational Therapy Goal    Goal Priority Disciplines Outcome Interventions   Occupational Therapy Goal   (Resolved)     OT, PT/OT Met                    History:     Past Medical History:   Diagnosis Date    Colon polyp 10/3/2013    COPD (chronic obstructive pulmonary disease) with emphysema     DDD (degenerative disc disease), lumbar     GERD (gastroesophageal reflux disease)     Pulmonary nodules     Vitamin B 12 deficiency     bets B12 shots       Past Surgical History:   Procedure Laterality Date    BACK SURGERY      times 2 lumbar    HYSTERECTOMY      complete       Time Tracking:     OT Date of Treatment: 12/18/20  OT Start Time: 1030  OT Stop Time: 1039  OT Total Time (min): 9 min    Billable Minutes:Evaluation 9  Total Time 9 (juan w/ PT)    Harlan Weiner OT  12/18/2020

## 2020-12-18 NOTE — PLAN OF CARE
Problem: Physical Therapy Goal  Goal: Physical Therapy Goal  Description: BSC and transfer tub bench.  No further PT needs.   Outcome: Adequate for Care Transition

## 2020-12-18 NOTE — PT/OT/SLP EVAL
"Physical Therapy Evaluation and Discharge Note    Patient Name:  Renée Lucas   MRN:  [id number]    Recommendations:     Discharge Recommendations:  home, other (see comments)(with ongoing family assistance)   Discharge Equipment Recommendations: other (see comments)(per OT)   Barriers to discharge: None    Assessment:     Renée Lucas is a 60 y.o. female admitted with a medical diagnosis of Acute on chronic respiratory failure with hypoxia and hypercapnia. .  At this time, patient is functioning at their prior level of function and does not require further acute PT services.     Recent Surgery: * No surgery found *      Plan:     During this hospitalization, patient does not require further acute PT services.  Please re-consult if situation changes.      Subjective     Chief Complaint: none  Patient/Family Comments/goals: walk again  Pain/Comfort:  · Pain Rating 1: 0/10    Patients cultural, spiritual, Baptism conflicts given the current situation: no    Living Environment:  Home with spouse with no RENA and with dtr.   Prior to admission, patients level of function was bedbound. Does not sit on EOB. Carried by  to tub. Transfer to transport WC when necessary (appts, etc).  Equipment used at home: walker, rolling, oxygen, other (see comments)(transport WC).  DME owned (not currently used): none.  Upon discharge, patient will have assistance from family.    Objective:     Communicated with RICKY Yap prior to session.  Patient found HOB elevated with peripheral IV upon PT entry to room.    General Precautions: Standard, fall, respiratory   Orthopedic Precautions:N/A   Braces: N/A     Exams:  · on 3LO2 throughout. pt weighs 73 lbs and is 5"3".  · Cognitive Exam:  Patient is oriented to Person, Place, Time and Situation  · Gross Motor Coordination:  WFL  · Postural Exam:  Patient presented with the following abnormalities:    · -       extremely frail  · Sensation:    · -       Intact  · Skin " Integrity/Edema:      · -       Skin integrity: Thin and Dry  · RLE ROM: WFL  · RLE Strength: Deficits: grossly 3/5  · LLE ROM: WFL  · LLE Strength: Deficits: grossly 3/5    Functional Mobility:  · Bed Mobility:     · Supine to Sit: supervision  · Sit to Supine: supervision  · Transfers:     · Sit to Stand:  stand by assistance with no AD  · Pt spontaneously stood, not long after having moved from supine to sit  · C/o dizziness in standing; guided to sit back down by writer  · Requested to return supine  · Balance: good seated    AM-PAC 6 CLICK MOBILITY  Total Score:18       Therapeutic Activities and Exercises:   n'a    AM-PAC 6 CLICK MOBILITY  Total Score:18     Patient left HOB elevated with call button in reach.    GOALS:   Multidisciplinary Problems     Physical Therapy Goals        Problem: Physical Therapy Goal    Goal Priority Disciplines Outcome Goal Variances Interventions   Physical Therapy Goal     PT, PT/OT Adequate for Care Transition     Description: BSC and transfer tub bench.  No further PT needs.                    History:     Past Medical History:   Diagnosis Date    Colon polyp 10/3/2013    COPD (chronic obstructive pulmonary disease) with emphysema     DDD (degenerative disc disease), lumbar     GERD (gastroesophageal reflux disease)     Pulmonary nodules     Vitamin B 12 deficiency     bets B12 shots       Past Surgical History:   Procedure Laterality Date    BACK SURGERY      times 2 lumbar    HYSTERECTOMY      complete       Time Tracking:     PT Received On: 12/18/20  PT Start Time: 1029     PT Stop Time: 1039  PT Total Time (min): 10 min     Billable Minutes: Evaluation 10   Coeval with OT      Aby Pate, PT  12/18/2020

## 2020-12-18 NOTE — ASSESSMENT & PLAN NOTE
-Mrs. Lucas was admitted to inpatient status in our ICU  -This was due to exacerbation of her end-stage COPD  -ABG showed worsening hypercapnia with hypoxia with PH of  7.3,PCO2 of  91 and PO 2 of 65  -Rapid covid negative  -She was treated in ICU with bipap, iv steroids, antibiotics and frequent nebulizers and improved rapidly.  -She was stepped down to the floor on 12/17 and today is very eager to go home  -We will continue her home Oxygen and I have ordered a BiPAP for at night.  -She will complete a slow prednisone taper back down to her usual 5mg po daily  -She will continue her home inhaler medications and nebulizers.  -HH recommended and offered but patient declined.  -She will need to follow up with Dr. Cox, her pulmonologist within 1 week.

## 2020-12-18 NOTE — PLAN OF CARE
EDUCATION:  Patient After Visit Summary (AVS) updated to include educational information on COPD that will be printed on patient's AVS to review upon discharge; Information reviewed with patient that include: signs and symptoms to look for and call the doctor if experiencing, and symptoms that may indicate a medical emergency: CALL 911.            SW taught patient about things she is responsible for when discharged to help with her recovery:  Particularly on how to Manageher care at home:  1. Getting his prescriptions filled.  2. Taking his medications as directed. DO NOT MISS ANY DOSES!  3. Going to his follow-up doctor appointments.       Help at home will be from pt's spouse, assisting in pt's recovery.    Per [facility], Jennifer, BIPAP will be delivered to pt's bedside between 3:30pm-4:00pm.     Follow-up appointments made and reviewed w/ patient; patient verbalized understanding     Nurse, Marely, notified that all CM needs have been met.         12/18/20 1333   Final Note   Assessment Type Final Discharge Note   Anticipated Discharge Disposition Home   What phone number can be called within the next 1-3 days to see how you are doing after discharge? [phone]   Hospital Follow Up  Appt(s) scheduled? Yes   Discharge plans and expectations educations in teach back method with documentation complete? Yes   Right Care Referral Info   Post Acute Recommendation No Care   Post-Acute Status   Discharge Delays None known at this time

## 2020-12-18 NOTE — ASSESSMENT & PLAN NOTE
-Counselled on cessation for 8 minutes  -Unfortunately patient is precontemplative.  She states she has nicotine tablets at home.  I discouraged her from using those while she was actively smoking.

## 2020-12-18 NOTE — PROGRESS NOTES
.HOW TO MANAGE YOUR CARE  AT HOME:  TN taught Symptoms and Problems for COPD home care with pt and with teach back:  1. SOB, 2.DIZZINESS, 3. COUGHING UP SPUTUM, 4. INCREASE USE OF FAST ACTING INHALER. TN placed education sheet in Linkage Packet..     HELP AT HOME TO ASSIST WITH PATIENT'S RECOVERY IS DAUGHTER/MARILU      TN taught patient about things she is responsible for when discharged TO HELP WITH HER RECOVERY:   How to Manage Her Care At Home:  1. Getting her prescriptions filled.  2. Taking her medications as directed. DO NOT MISS ANY DOSES!  3. Going to her follow-up doctor appointments.   .  Laurie Hernandez, RN, BSN, STN CCM

## 2020-12-21 LAB
ALLENS TEST: ABNORMAL
DELSYS: ABNORMAL
EP: 8
ERYTHROCYTE [SEDIMENTATION RATE] IN BLOOD BY WESTERGREN METHOD: 10 MM/H
FIO2: 45
HCO3 UR-SCNC: 16.1 MMOL/L (ref 24–28)
IP: 15
MODE: ABNORMAL
PCO2 BLDA: 33 MMHG (ref 35–45)
PH SMN: 7.29 [PH] (ref 7.35–7.45)
PO2 BLDA: 44 MMHG (ref 40–60)
POC BE: -10 MMOL/L
POC SATURATED O2: 75 % (ref 95–100)
POC TCO2: 17 MMOL/L (ref 24–29)
SAMPLE: ABNORMAL
SITE: ABNORMAL

## 2020-12-22 ENCOUNTER — PATIENT OUTREACH (OUTPATIENT)
Dept: ADMINISTRATIVE | Facility: CLINIC | Age: 60
End: 2020-12-22

## 2020-12-22 RX ORDER — ERGOCALCIFEROL 1.25 MG/1
50000 CAPSULE ORAL
COMMUNITY

## 2020-12-22 NOTE — PATIENT INSTRUCTIONS
COPD Flare    You have had a flare-up of your COPD.  COPD, or chronic obstructive pulmonary disease, is a common lung disease. It causes your airways to become irritated and narrower. This makes it harder for you to breathe. Emphysema and chronic bronchitis are both types of COPD. This is a chronic condition, which means you always have it. Sometimes it gets worse. When this happens, it is called a flare-up.  Symptoms of COPD  People with COPD may have symptoms most of the time. In a flare-up, your symptoms get worse. These symptoms may mean you are having a flare-up:  · Shortness of breath, shallow or rapid breathing, or wheezing that gets worse  · Lung infection  · Cough that gets worse  · More mucus, thicker mucus or mucus of a different color  · Tiredness, decreased energy, or trouble doing your usual activities  · Fever  · Chest tightness  · Your symptoms dont get better even when you use your usual medicines, inhalers, and nebulizer  · Trouble talking  · You feel confused  Causes of flare-ups  Unfortunately, a flare-up can happen even though you did everything right, and you followed your doctors instructions. Some causes of flare-ups are:  · Smoking or secondhand smoke  · Colds, the flu, or respiratory infections  · Air pollution  · Sudden change in the weather  · Dust, irritating chemicals, or strong fumes  · Not taking your medicines as prescribed  Home care  Here are some things you can do at home to treat a flare-up:  · Try not to panic. This makes it harder to breathe, and keeps you from doing the right things.  · Dont smoke or be around others who are smoking.  · Try to drink more fluids than usual during a flare-up, unless your doctor has told you not to because of heart and kidney problems. More fluids can help loosen the mucus.  · Use your inhalers and nebulizer, if you have one, as you have been told to.  · If you were given antibiotics, take them until they are used up or your doctor tells you  to stop. Its important to finish the antibiotics, even though you feel better. This will make sure the infection has cleared.  · If you were given prednisone or another steroid, finish it even if you feel better.  Preventing a flare-up  Even though flare-ups happen, the best way to treat one is to prevent it before it starts. Here are some pointers:  · Dont smoke or be around others who are smoking.  · Take your medicines as you have been told.  · Talk with your doctor about getting a flu shot every year. Also find out if you need a pneumonia shot.  · If there is a weather advisory warning to stay indoors, try to stay inside when possible.  · Try to eat healthy and get plenty of sleep.  · Try to avoid things that usually set you off, like dust, chemical fumes, hairsprays, or strong perfumes.  Follow-up care  Follow up with your healthcare provider, or as advised.  If a culture was done, you will be told if your treatment needs to be changed. You can call as directed for the results.  If X-rays were done, you will be notified of any new findings that may affect your care.  Call 911  Call 911 if any of these occur:  · You have trouble breathing  · You feel confused or its difficult to wake you up  · You faint or lose consciousness  · You have a rapid heart rate  · You have new pain in your chest, arm, shoulder, neck or upper back  When to seek medical advice  Call your healthcare provider right away if any of these occur:  · Wheezing or shortness of breath gets worse  · You need to use your inhalers more often than usual without relief  · Fever of 100.4°F (38ºC) or higher, or as directed by your healthcare provider  · Coughing up lots of dark-colored or bloody mucus (sputum)  · Chest pain with each breath  · You do not start to get better within 24 hours  · Swelling of your ankles gets worse  · Dizziness or weakness  Date Last Reviewed: 9/1/2016  © 8901-8482 [employer]. [address] [facility],  [place] [address]. All rights reserved. This information is not intended as a substitute for professional medical care. Always follow your healthcare professional's instructions.

## 2021-02-05 ENCOUNTER — PATIENT OUTREACH (OUTPATIENT)
Dept: ADMINISTRATIVE | Facility: HOSPITAL | Age: 61
End: 2021-02-05

## 2021-02-19 ENCOUNTER — TELEPHONE (OUTPATIENT)
Dept: FAMILY MEDICINE | Facility: CLINIC | Age: 61
End: 2021-02-19

## 2021-02-19 ENCOUNTER — OFFICE VISIT (OUTPATIENT)
Dept: FAMILY MEDICINE | Facility: CLINIC | Age: 61
End: 2021-02-19
Payer: MEDICARE

## 2021-02-19 DIAGNOSIS — J43.1 PANLOBULAR EMPHYSEMA: Primary | ICD-10-CM

## 2021-02-19 DIAGNOSIS — F33.41 RECURRENT MAJOR DEPRESSIVE DISORDER, IN PARTIAL REMISSION: ICD-10-CM

## 2021-02-19 DIAGNOSIS — J96.11 CHRONIC RESPIRATORY FAILURE WITH HYPOXIA: ICD-10-CM

## 2021-02-19 PROCEDURE — 99213 PR OFFICE/OUTPT VISIT, EST, LEVL III, 20-29 MIN: ICD-10-PCS | Mod: 95,,, | Performed by: INTERNAL MEDICINE

## 2021-02-19 PROCEDURE — 99213 OFFICE O/P EST LOW 20 MIN: CPT | Mod: 95,,, | Performed by: INTERNAL MEDICINE

## 2021-02-19 RX ORDER — DOXYCYCLINE HYCLATE 100 MG
100 TABLET ORAL 2 TIMES DAILY
Qty: 14 TABLET | Refills: 1 | Status: ON HOLD | OUTPATIENT
Start: 2021-02-19 | End: 2021-03-01 | Stop reason: HOSPADM

## 2021-02-19 RX ORDER — MIRTAZAPINE 30 MG/1
30 TABLET, FILM COATED ORAL NIGHTLY
Qty: 90 TABLET | Refills: 3 | Status: SHIPPED | OUTPATIENT
Start: 2021-02-19

## 2021-02-26 ENCOUNTER — HOSPITAL ENCOUNTER (INPATIENT)
Facility: HOSPITAL | Age: 61
LOS: 2 days | Discharge: HOME HEALTH | DRG: 190 | End: 2021-03-01
Attending: EMERGENCY MEDICINE | Admitting: HOSPITALIST
Payer: MEDICARE

## 2021-02-26 DIAGNOSIS — J96.21 ACUTE ON CHRONIC RESPIRATORY FAILURE WITH HYPOXIA AND HYPERCAPNIA: Primary | ICD-10-CM

## 2021-02-26 DIAGNOSIS — J44.1 COPD WITH ACUTE EXACERBATION: ICD-10-CM

## 2021-02-26 DIAGNOSIS — J44.1 COPD EXACERBATION: ICD-10-CM

## 2021-02-26 DIAGNOSIS — R06.02 SOB (SHORTNESS OF BREATH): ICD-10-CM

## 2021-02-26 DIAGNOSIS — Z51.5 PALLIATIVE CARE ENCOUNTER: ICD-10-CM

## 2021-02-26 DIAGNOSIS — J96.22 ACUTE ON CHRONIC RESPIRATORY FAILURE WITH HYPOXIA AND HYPERCAPNIA: Primary | ICD-10-CM

## 2021-02-26 DIAGNOSIS — E87.3 METABOLIC ALKALOSIS: ICD-10-CM

## 2021-02-26 DIAGNOSIS — R06.02 SHORTNESS OF BREATH: ICD-10-CM

## 2021-02-26 PROCEDURE — 96375 TX/PRO/DX INJ NEW DRUG ADDON: CPT

## 2021-02-26 PROCEDURE — 99285 EMERGENCY DEPT VISIT HI MDM: CPT | Mod: 25

## 2021-02-26 PROCEDURE — U0002 COVID-19 LAB TEST NON-CDC: HCPCS | Performed by: EMERGENCY MEDICINE

## 2021-02-26 PROCEDURE — 96374 THER/PROPH/DIAG INJ IV PUSH: CPT

## 2021-02-26 RX ORDER — ALBUTEROL SULFATE 2.5 MG/.5ML
10 SOLUTION RESPIRATORY (INHALATION)
Status: COMPLETED | OUTPATIENT
Start: 2021-02-26 | End: 2021-02-27

## 2021-02-26 RX ORDER — METHYLPREDNISOLONE SOD SUCC 125 MG
125 VIAL (EA) INJECTION
Status: COMPLETED | OUTPATIENT
Start: 2021-02-26 | End: 2021-02-27

## 2021-02-26 RX ORDER — IPRATROPIUM BROMIDE 0.5 MG/2.5ML
1.5 SOLUTION RESPIRATORY (INHALATION)
Status: COMPLETED | OUTPATIENT
Start: 2021-02-26 | End: 2021-02-27

## 2021-02-27 LAB
ALBUMIN SERPL BCP-MCNC: 2.9 G/DL (ref 3.5–5.2)
ALLENS TEST: ABNORMAL
ALLENS TEST: ABNORMAL
ALP SERPL-CCNC: 69 U/L (ref 55–135)
ALT SERPL W/O P-5'-P-CCNC: 11 U/L (ref 10–44)
ANION GAP SERPL CALC-SCNC: 6 MMOL/L (ref 8–16)
ANION GAP SERPL CALC-SCNC: 7 MMOL/L (ref 8–16)
AST SERPL-CCNC: 13 U/L (ref 10–40)
BASOPHILS # BLD AUTO: 0.02 K/UL (ref 0–0.2)
BASOPHILS # BLD AUTO: 0.03 K/UL (ref 0–0.2)
BASOPHILS NFR BLD: 0.3 % (ref 0–1.9)
BASOPHILS NFR BLD: 0.3 % (ref 0–1.9)
BILIRUB SERPL-MCNC: 0.2 MG/DL (ref 0.1–1)
BNP SERPL-MCNC: 29 PG/ML (ref 0–99)
BUN SERPL-MCNC: 11 MG/DL (ref 6–20)
BUN SERPL-MCNC: 11 MG/DL (ref 6–20)
CALCIUM SERPL-MCNC: 8.7 MG/DL (ref 8.7–10.5)
CALCIUM SERPL-MCNC: 8.8 MG/DL (ref 8.7–10.5)
CHLORIDE SERPL-SCNC: 94 MMOL/L (ref 95–110)
CHLORIDE SERPL-SCNC: 96 MMOL/L (ref 95–110)
CO2 SERPL-SCNC: 44 MMOL/L (ref 23–29)
CO2 SERPL-SCNC: 45 MMOL/L (ref 23–29)
CREAT SERPL-MCNC: 0.5 MG/DL (ref 0.5–1.4)
CREAT SERPL-MCNC: 0.5 MG/DL (ref 0.5–1.4)
CTP QC/QA: YES
D DIMER PPP IA.FEU-MCNC: <0.19 MG/L FEU
DELSYS: ABNORMAL
DELSYS: ABNORMAL
DIFFERENTIAL METHOD: ABNORMAL
DIFFERENTIAL METHOD: ABNORMAL
EOSINOPHIL # BLD AUTO: 0 K/UL (ref 0–0.5)
EOSINOPHIL # BLD AUTO: 0.1 K/UL (ref 0–0.5)
EOSINOPHIL NFR BLD: 0 % (ref 0–8)
EOSINOPHIL NFR BLD: 1.5 % (ref 0–8)
ERYTHROCYTE [DISTWIDTH] IN BLOOD BY AUTOMATED COUNT: 13.6 % (ref 11.5–14.5)
ERYTHROCYTE [DISTWIDTH] IN BLOOD BY AUTOMATED COUNT: 13.6 % (ref 11.5–14.5)
EST. GFR  (AFRICAN AMERICAN): >60 ML/MIN/1.73 M^2
EST. GFR  (AFRICAN AMERICAN): >60 ML/MIN/1.73 M^2
EST. GFR  (NON AFRICAN AMERICAN): >60 ML/MIN/1.73 M^2
EST. GFR  (NON AFRICAN AMERICAN): >60 ML/MIN/1.73 M^2
FIO2: 32
FLOW: 3
FLOW: 3
GLUCOSE SERPL-MCNC: 141 MG/DL (ref 70–110)
GLUCOSE SERPL-MCNC: 150 MG/DL (ref 70–110)
HCO3 UR-SCNC: 50.9 MMOL/L (ref 24–28)
HCO3 UR-SCNC: 51.1 MMOL/L (ref 24–28)
HCT VFR BLD AUTO: 34.8 % (ref 37–48.5)
HCT VFR BLD AUTO: 35.4 % (ref 37–48.5)
HGB BLD-MCNC: 10.7 G/DL (ref 12–16)
HGB BLD-MCNC: 10.8 G/DL (ref 12–16)
IMM GRANULOCYTES # BLD AUTO: 0.03 K/UL (ref 0–0.04)
IMM GRANULOCYTES # BLD AUTO: 0.04 K/UL (ref 0–0.04)
IMM GRANULOCYTES NFR BLD AUTO: 0.4 % (ref 0–0.5)
IMM GRANULOCYTES NFR BLD AUTO: 0.4 % (ref 0–0.5)
LYMPHOCYTES # BLD AUTO: 0.2 K/UL (ref 1–4.8)
LYMPHOCYTES # BLD AUTO: 0.6 K/UL (ref 1–4.8)
LYMPHOCYTES NFR BLD: 2 % (ref 18–48)
LYMPHOCYTES NFR BLD: 7 % (ref 18–48)
MAGNESIUM SERPL-MCNC: 2.6 MG/DL (ref 1.6–2.6)
MCH RBC QN AUTO: 30.1 PG (ref 27–31)
MCH RBC QN AUTO: 30.5 PG (ref 27–31)
MCHC RBC AUTO-ENTMCNC: 30.5 G/DL (ref 32–36)
MCHC RBC AUTO-ENTMCNC: 30.7 G/DL (ref 32–36)
MCV RBC AUTO: 99 FL (ref 82–98)
MCV RBC AUTO: 99 FL (ref 82–98)
MODE: ABNORMAL
MODE: ABNORMAL
MONOCYTES # BLD AUTO: 0 K/UL (ref 0.3–1)
MONOCYTES # BLD AUTO: 0.3 K/UL (ref 0.3–1)
MONOCYTES NFR BLD: 0.4 % (ref 4–15)
MONOCYTES NFR BLD: 3.6 % (ref 4–15)
NEUTROPHILS # BLD AUTO: 7.3 K/UL (ref 1.8–7.7)
NEUTROPHILS # BLD AUTO: 7.9 K/UL (ref 1.8–7.7)
NEUTROPHILS NFR BLD: 87.2 % (ref 38–73)
NEUTROPHILS NFR BLD: 96.9 % (ref 38–73)
NRBC BLD-RTO: 0 /100 WBC
NRBC BLD-RTO: 0 /100 WBC
PCO2 BLDA: 105 MMHG (ref 35–45)
PCO2 BLDA: 93.2 MMHG (ref 35–45)
PH SMN: 7.29 [PH] (ref 7.35–7.45)
PH SMN: 7.34 [PH] (ref 7.35–7.45)
PHOSPHATE SERPL-MCNC: 3.7 MG/DL (ref 2.7–4.5)
PLATELET # BLD AUTO: 311 K/UL (ref 150–350)
PLATELET # BLD AUTO: 338 K/UL (ref 150–350)
PMV BLD AUTO: 10.9 FL (ref 9.2–12.9)
PMV BLD AUTO: 11 FL (ref 9.2–12.9)
PO2 BLDA: 87 MMHG (ref 80–100)
PO2 BLDA: 92 MMHG (ref 80–100)
POC BE: 20 MMOL/L
POC BE: 21 MMOL/L
POC SATURATED O2: 94 % (ref 95–100)
POC SATURATED O2: 96 % (ref 95–100)
POC TCO2: >50 MMOL/L (ref 23–27)
POC TCO2: >50 MMOL/L (ref 23–27)
POTASSIUM SERPL-SCNC: 4.2 MMOL/L (ref 3.5–5.1)
POTASSIUM SERPL-SCNC: 4.3 MMOL/L (ref 3.5–5.1)
PROT SERPL-MCNC: 6 G/DL (ref 6–8.4)
RBC # BLD AUTO: 3.51 M/UL (ref 4–5.4)
RBC # BLD AUTO: 3.59 M/UL (ref 4–5.4)
SAMPLE: ABNORMAL
SAMPLE: ABNORMAL
SARS-COV-2 RDRP RESP QL NAA+PROBE: NEGATIVE
SITE: ABNORMAL
SITE: ABNORMAL
SODIUM SERPL-SCNC: 144 MMOL/L (ref 136–145)
SODIUM SERPL-SCNC: 148 MMOL/L (ref 136–145)
SP02: 96
SP02: 96
TROPONIN I SERPL DL<=0.01 NG/ML-MCNC: 0.01 NG/ML (ref 0–0.03)
WBC # BLD AUTO: 7.51 K/UL (ref 3.9–12.7)
WBC # BLD AUTO: 9.09 K/UL (ref 3.9–12.7)

## 2021-02-27 PROCEDURE — 25000242 PHARM REV CODE 250 ALT 637 W/ HCPCS: Performed by: INTERNAL MEDICINE

## 2021-02-27 PROCEDURE — 94644 CONT INHLJ TX 1ST HOUR: CPT

## 2021-02-27 PROCEDURE — 27000190 HC CPAP FULL FACE MASK W/VALVE

## 2021-02-27 PROCEDURE — 63600175 PHARM REV CODE 636 W HCPCS: Performed by: INTERNAL MEDICINE

## 2021-02-27 PROCEDURE — 85379 FIBRIN DEGRADATION QUANT: CPT

## 2021-02-27 PROCEDURE — 83735 ASSAY OF MAGNESIUM: CPT

## 2021-02-27 PROCEDURE — 82803 BLOOD GASES ANY COMBINATION: CPT

## 2021-02-27 PROCEDURE — 84100 ASSAY OF PHOSPHORUS: CPT

## 2021-02-27 PROCEDURE — 80048 BASIC METABOLIC PNL TOTAL CA: CPT

## 2021-02-27 PROCEDURE — 93010 ELECTROCARDIOGRAM REPORT: CPT | Mod: ,,, | Performed by: INTERNAL MEDICINE

## 2021-02-27 PROCEDURE — 25000242 PHARM REV CODE 250 ALT 637 W/ HCPCS: Performed by: EMERGENCY MEDICINE

## 2021-02-27 PROCEDURE — 36600 WITHDRAWAL OF ARTERIAL BLOOD: CPT

## 2021-02-27 PROCEDURE — 94640 AIRWAY INHALATION TREATMENT: CPT

## 2021-02-27 PROCEDURE — 20000000 HC ICU ROOM

## 2021-02-27 PROCEDURE — S4991 NICOTINE PATCH NONLEGEND: HCPCS | Performed by: INTERNAL MEDICINE

## 2021-02-27 PROCEDURE — 93005 ELECTROCARDIOGRAM TRACING: CPT

## 2021-02-27 PROCEDURE — 94761 N-INVAS EAR/PLS OXIMETRY MLT: CPT

## 2021-02-27 PROCEDURE — 63600175 PHARM REV CODE 636 W HCPCS: Performed by: EMERGENCY MEDICINE

## 2021-02-27 PROCEDURE — 25000003 PHARM REV CODE 250: Performed by: HOSPITALIST

## 2021-02-27 PROCEDURE — 27000221 HC OXYGEN, UP TO 24 HOURS

## 2021-02-27 PROCEDURE — 27100171 HC OXYGEN HIGH FLOW UP TO 24 HOURS

## 2021-02-27 PROCEDURE — 80053 COMPREHEN METABOLIC PANEL: CPT

## 2021-02-27 PROCEDURE — 25000242 PHARM REV CODE 250 ALT 637 W/ HCPCS: Performed by: PHYSICIAN ASSISTANT

## 2021-02-27 PROCEDURE — 83880 ASSAY OF NATRIURETIC PEPTIDE: CPT

## 2021-02-27 PROCEDURE — 94660 CPAP INITIATION&MGMT: CPT

## 2021-02-27 PROCEDURE — 25000003 PHARM REV CODE 250: Performed by: INTERNAL MEDICINE

## 2021-02-27 PROCEDURE — 99900035 HC TECH TIME PER 15 MIN (STAT)

## 2021-02-27 PROCEDURE — 93010 EKG 12-LEAD: ICD-10-PCS | Mod: ,,, | Performed by: INTERNAL MEDICINE

## 2021-02-27 PROCEDURE — 85025 COMPLETE CBC W/AUTO DIFF WBC: CPT

## 2021-02-27 PROCEDURE — 84484 ASSAY OF TROPONIN QUANT: CPT

## 2021-02-27 PROCEDURE — 63600175 PHARM REV CODE 636 W HCPCS: Performed by: PHYSICIAN ASSISTANT

## 2021-02-27 PROCEDURE — 36415 COLL VENOUS BLD VENIPUNCTURE: CPT

## 2021-02-27 RX ORDER — LANOLIN ALCOHOL/MO/W.PET/CERES
200 CREAM (GRAM) TOPICAL 2 TIMES DAILY
Status: DISCONTINUED | OUTPATIENT
Start: 2021-02-27 | End: 2021-02-28

## 2021-02-27 RX ORDER — MONTELUKAST SODIUM 10 MG/1
10 TABLET ORAL DAILY
Status: DISCONTINUED | OUTPATIENT
Start: 2021-02-27 | End: 2021-03-01 | Stop reason: HOSPADM

## 2021-02-27 RX ORDER — MUPIROCIN 20 MG/G
OINTMENT TOPICAL 2 TIMES DAILY
Status: DISCONTINUED | OUTPATIENT
Start: 2021-02-27 | End: 2021-03-01 | Stop reason: HOSPADM

## 2021-02-27 RX ORDER — ERGOCALCIFEROL 1.25 MG/1
50000 CAPSULE ORAL
Status: DISCONTINUED | OUTPATIENT
Start: 2021-02-27 | End: 2021-03-01 | Stop reason: HOSPADM

## 2021-02-27 RX ORDER — MAGNESIUM SULFATE HEPTAHYDRATE 40 MG/ML
2 INJECTION, SOLUTION INTRAVENOUS
Status: COMPLETED | OUTPATIENT
Start: 2021-02-27 | End: 2021-02-27

## 2021-02-27 RX ORDER — FLUTICASONE FUROATE AND VILANTEROL 200; 25 UG/1; UG/1
1 POWDER RESPIRATORY (INHALATION) DAILY
Status: DISCONTINUED | OUTPATIENT
Start: 2021-02-27 | End: 2021-03-01 | Stop reason: HOSPADM

## 2021-02-27 RX ORDER — ENOXAPARIN SODIUM 100 MG/ML
40 INJECTION SUBCUTANEOUS EVERY 24 HOURS
Status: DISCONTINUED | OUTPATIENT
Start: 2021-02-27 | End: 2021-03-01 | Stop reason: HOSPADM

## 2021-02-27 RX ORDER — SODIUM CHLORIDE 9 MG/ML
INJECTION, SOLUTION INTRAVENOUS CONTINUOUS
Status: DISCONTINUED | OUTPATIENT
Start: 2021-02-27 | End: 2021-02-28

## 2021-02-27 RX ORDER — HEPARIN SODIUM 5000 [USP'U]/ML
5000 INJECTION, SOLUTION INTRAVENOUS; SUBCUTANEOUS EVERY 8 HOURS
Status: DISCONTINUED | OUTPATIENT
Start: 2021-02-27 | End: 2021-02-27

## 2021-02-27 RX ORDER — ESCITALOPRAM OXALATE 10 MG/1
10 TABLET ORAL DAILY
Status: DISCONTINUED | OUTPATIENT
Start: 2021-02-27 | End: 2021-03-01 | Stop reason: HOSPADM

## 2021-02-27 RX ORDER — DOXYCYCLINE HYCLATE 100 MG
100 TABLET ORAL EVERY 12 HOURS
Status: DISCONTINUED | OUTPATIENT
Start: 2021-02-27 | End: 2021-03-01 | Stop reason: HOSPADM

## 2021-02-27 RX ORDER — MIRTAZAPINE 15 MG/1
30 TABLET, FILM COATED ORAL NIGHTLY
Status: DISCONTINUED | OUTPATIENT
Start: 2021-02-27 | End: 2021-03-01 | Stop reason: HOSPADM

## 2021-02-27 RX ORDER — IPRATROPIUM BROMIDE AND ALBUTEROL SULFATE 2.5; .5 MG/3ML; MG/3ML
3 SOLUTION RESPIRATORY (INHALATION)
Status: DISCONTINUED | OUTPATIENT
Start: 2021-02-27 | End: 2021-03-01 | Stop reason: HOSPADM

## 2021-02-27 RX ORDER — ENOXAPARIN SODIUM 100 MG/ML
30 INJECTION SUBCUTANEOUS EVERY 24 HOURS
Status: DISCONTINUED | OUTPATIENT
Start: 2021-02-27 | End: 2021-02-27

## 2021-02-27 RX ORDER — SODIUM CHLORIDE 0.9 % (FLUSH) 0.9 %
3 SYRINGE (ML) INJECTION
Status: DISCONTINUED | OUTPATIENT
Start: 2021-02-27 | End: 2021-03-01 | Stop reason: HOSPADM

## 2021-02-27 RX ORDER — IBUPROFEN 200 MG
1 TABLET ORAL DAILY
Status: DISCONTINUED | OUTPATIENT
Start: 2021-02-27 | End: 2021-03-01 | Stop reason: HOSPADM

## 2021-02-27 RX ORDER — IPRATROPIUM BROMIDE AND ALBUTEROL SULFATE 2.5; .5 MG/3ML; MG/3ML
3 SOLUTION RESPIRATORY (INHALATION) EVERY 4 HOURS
Status: DISCONTINUED | OUTPATIENT
Start: 2021-02-27 | End: 2021-02-27

## 2021-02-27 RX ORDER — TESTOSTERONE CYPIONATE 200 MG/ML
50 INJECTION, SOLUTION INTRAMUSCULAR ONCE
Status: DISCONTINUED | OUTPATIENT
Start: 2021-02-27 | End: 2021-02-27

## 2021-02-27 RX ORDER — CYANOCOBALAMIN 1000 UG/ML
1000 INJECTION, SOLUTION INTRAMUSCULAR; SUBCUTANEOUS DAILY
Status: COMPLETED | OUTPATIENT
Start: 2021-02-27 | End: 2021-03-01

## 2021-02-27 RX ORDER — THEOPHYLLINE 80 MG/15ML
100 SOLUTION ORAL EVERY 8 HOURS
Status: DISCONTINUED | OUTPATIENT
Start: 2021-02-27 | End: 2021-02-27

## 2021-02-27 RX ORDER — PREDNISONE 20 MG/1
40 TABLET ORAL DAILY
Status: DISCONTINUED | OUTPATIENT
Start: 2021-02-27 | End: 2021-02-28

## 2021-02-27 RX ADMIN — METHYLPREDNISOLONE SODIUM SUCCINATE 125 MG: 125 INJECTION, POWDER, FOR SOLUTION INTRAMUSCULAR; INTRAVENOUS at 12:02

## 2021-02-27 RX ADMIN — IPRATROPIUM BROMIDE AND ALBUTEROL SULFATE 3 ML: .5; 3 SOLUTION RESPIRATORY (INHALATION) at 04:02

## 2021-02-27 RX ADMIN — Medication 200 MG: at 09:02

## 2021-02-27 RX ADMIN — ALBUTEROL SULFATE 10 MG: 2.5 SOLUTION RESPIRATORY (INHALATION) at 12:02

## 2021-02-27 RX ADMIN — IPRATROPIUM BROMIDE AND ALBUTEROL SULFATE 3 ML: .5; 3 SOLUTION RESPIRATORY (INHALATION) at 08:02

## 2021-02-27 RX ADMIN — SODIUM CHLORIDE: 0.9 INJECTION, SOLUTION INTRAVENOUS at 09:02

## 2021-02-27 RX ADMIN — NICOTINE 1 PATCH: 14 PATCH TRANSDERMAL at 09:02

## 2021-02-27 RX ADMIN — MAGNESIUM SULFATE IN WATER 2 G: 40 INJECTION, SOLUTION INTRAVENOUS at 01:02

## 2021-02-27 RX ADMIN — CYANOCOBALAMIN 1000 MCG: 1000 INJECTION, SOLUTION INTRAMUSCULAR; SUBCUTANEOUS at 09:02

## 2021-02-27 RX ADMIN — IPRATROPIUM BROMIDE 1.5 MG: 0.5 SOLUTION RESPIRATORY (INHALATION) at 12:02

## 2021-02-27 RX ADMIN — IPRATROPIUM BROMIDE AND ALBUTEROL SULFATE 3 ML: .5; 3 SOLUTION RESPIRATORY (INHALATION) at 01:02

## 2021-02-27 RX ADMIN — ESCITALOPRAM OXALATE 10 MG: 10 TABLET ORAL at 09:02

## 2021-02-27 RX ADMIN — MUPIROCIN: 20 OINTMENT TOPICAL at 09:02

## 2021-02-27 RX ADMIN — THIAMINE HYDROCHLORIDE 500 MG: 100 INJECTION, SOLUTION INTRAMUSCULAR; INTRAVENOUS at 09:02

## 2021-02-27 RX ADMIN — DOXYCYCLINE HYCLATE 100 MG: 100 TABLET, COATED ORAL at 09:02

## 2021-02-27 RX ADMIN — ERGOCALCIFEROL 50000 UNITS: 1.25 CAPSULE ORAL at 09:02

## 2021-02-27 RX ADMIN — IPRATROPIUM BROMIDE AND ALBUTEROL SULFATE 3 ML: .5; 3 SOLUTION RESPIRATORY (INHALATION) at 07:02

## 2021-02-27 RX ADMIN — MIRTAZAPINE 30 MG: 15 TABLET, FILM COATED ORAL at 09:02

## 2021-02-27 RX ADMIN — ENOXAPARIN SODIUM 40 MG: 40 INJECTION SUBCUTANEOUS at 09:02

## 2021-02-27 RX ADMIN — HEPARIN SODIUM 5000 UNITS: 5000 INJECTION INTRAVENOUS; SUBCUTANEOUS at 06:02

## 2021-02-27 RX ADMIN — MUPIROCIN 1 G: 20 OINTMENT TOPICAL at 09:02

## 2021-02-27 RX ADMIN — MONTELUKAST 10 MG: 10 TABLET, FILM COATED ORAL at 09:02

## 2021-02-27 RX ADMIN — PREDNISONE 40 MG: 20 TABLET ORAL at 09:02

## 2021-02-27 RX ADMIN — Medication 1 TABLET: at 09:02

## 2021-02-28 LAB
ALLENS TEST: ABNORMAL
ANION GAP SERPL CALC-SCNC: 6 MMOL/L (ref 8–16)
BASOPHILS # BLD AUTO: 0.01 K/UL (ref 0–0.2)
BASOPHILS NFR BLD: 0.1 % (ref 0–1.9)
BUN SERPL-MCNC: 14 MG/DL (ref 6–20)
CALCIUM SERPL-MCNC: 9 MG/DL (ref 8.7–10.5)
CHLORIDE SERPL-SCNC: 101 MMOL/L (ref 95–110)
CO2 SERPL-SCNC: 39 MMOL/L (ref 23–29)
CREAT SERPL-MCNC: 0.6 MG/DL (ref 0.5–1.4)
DELSYS: ABNORMAL
DIFFERENTIAL METHOD: ABNORMAL
EOSINOPHIL # BLD AUTO: 0.1 K/UL (ref 0–0.5)
EOSINOPHIL NFR BLD: 0.7 % (ref 0–8)
ERYTHROCYTE [DISTWIDTH] IN BLOOD BY AUTOMATED COUNT: 13.5 % (ref 11.5–14.5)
ERYTHROCYTE [SEDIMENTATION RATE] IN BLOOD BY WESTERGREN METHOD: 18 MM/H
EST. GFR  (AFRICAN AMERICAN): >60 ML/MIN/1.73 M^2
EST. GFR  (NON AFRICAN AMERICAN): >60 ML/MIN/1.73 M^2
FIO2: 30
GLUCOSE SERPL-MCNC: 92 MG/DL (ref 70–110)
HCO3 UR-SCNC: 43 MMOL/L (ref 24–28)
HCT VFR BLD AUTO: 33.9 % (ref 37–48.5)
HGB BLD-MCNC: 10.1 G/DL (ref 12–16)
IMM GRANULOCYTES # BLD AUTO: 0.02 K/UL (ref 0–0.04)
IMM GRANULOCYTES NFR BLD AUTO: 0.3 % (ref 0–0.5)
LYMPHOCYTES # BLD AUTO: 1.4 K/UL (ref 1–4.8)
LYMPHOCYTES NFR BLD: 20.8 % (ref 18–48)
MAGNESIUM SERPL-MCNC: 2.1 MG/DL (ref 1.6–2.6)
MCH RBC QN AUTO: 29.3 PG (ref 27–31)
MCHC RBC AUTO-ENTMCNC: 29.8 G/DL (ref 32–36)
MCV RBC AUTO: 98 FL (ref 82–98)
MODE: ABNORMAL
MONOCYTES # BLD AUTO: 0.6 K/UL (ref 0.3–1)
MONOCYTES NFR BLD: 8.7 % (ref 4–15)
NEUTROPHILS # BLD AUTO: 4.8 K/UL (ref 1.8–7.7)
NEUTROPHILS NFR BLD: 69.4 % (ref 38–73)
NRBC BLD-RTO: 0 /100 WBC
PCO2 BLDA: 79.8 MMHG (ref 35–45)
PH SMN: 7.34 [PH] (ref 7.35–7.45)
PHOSPHATE SERPL-MCNC: 3.1 MG/DL (ref 2.7–4.5)
PIP: 22
PLATELET # BLD AUTO: 300 K/UL (ref 150–350)
PMV BLD AUTO: 11.3 FL (ref 9.2–12.9)
PO2 BLDA: 96 MMHG (ref 80–100)
POC BE: 14 MMOL/L
POC SATURATED O2: 96 % (ref 95–100)
POC TCO2: 45 MMOL/L (ref 23–27)
POTASSIUM SERPL-SCNC: 4 MMOL/L (ref 3.5–5.1)
RBC # BLD AUTO: 3.45 M/UL (ref 4–5.4)
SAMPLE: ABNORMAL
SITE: ABNORMAL
SODIUM SERPL-SCNC: 146 MMOL/L (ref 136–145)
VT: 530
WBC # BLD AUTO: 6.88 K/UL (ref 3.9–12.7)

## 2021-02-28 PROCEDURE — 27000221 HC OXYGEN, UP TO 24 HOURS

## 2021-02-28 PROCEDURE — 25000003 PHARM REV CODE 250: Performed by: HOSPITALIST

## 2021-02-28 PROCEDURE — 11000001 HC ACUTE MED/SURG PRIVATE ROOM

## 2021-02-28 PROCEDURE — 25000242 PHARM REV CODE 250 ALT 637 W/ HCPCS: Performed by: INTERNAL MEDICINE

## 2021-02-28 PROCEDURE — 94640 AIRWAY INHALATION TREATMENT: CPT

## 2021-02-28 PROCEDURE — 82803 BLOOD GASES ANY COMBINATION: CPT

## 2021-02-28 PROCEDURE — 85025 COMPLETE CBC W/AUTO DIFF WBC: CPT

## 2021-02-28 PROCEDURE — 25000242 PHARM REV CODE 250 ALT 637 W/ HCPCS: Performed by: HOSPITALIST

## 2021-02-28 PROCEDURE — 94761 N-INVAS EAR/PLS OXIMETRY MLT: CPT

## 2021-02-28 PROCEDURE — 63600175 PHARM REV CODE 636 W HCPCS: Performed by: HOSPITALIST

## 2021-02-28 PROCEDURE — 94660 CPAP INITIATION&MGMT: CPT

## 2021-02-28 PROCEDURE — 36600 WITHDRAWAL OF ARTERIAL BLOOD: CPT

## 2021-02-28 PROCEDURE — 63600175 PHARM REV CODE 636 W HCPCS: Performed by: INTERNAL MEDICINE

## 2021-02-28 PROCEDURE — 84100 ASSAY OF PHOSPHORUS: CPT

## 2021-02-28 PROCEDURE — S4991 NICOTINE PATCH NONLEGEND: HCPCS | Performed by: INTERNAL MEDICINE

## 2021-02-28 PROCEDURE — 36415 COLL VENOUS BLD VENIPUNCTURE: CPT

## 2021-02-28 PROCEDURE — 83735 ASSAY OF MAGNESIUM: CPT

## 2021-02-28 PROCEDURE — 25000003 PHARM REV CODE 250: Performed by: INTERNAL MEDICINE

## 2021-02-28 PROCEDURE — 63600175 PHARM REV CODE 636 W HCPCS: Performed by: PHYSICIAN ASSISTANT

## 2021-02-28 PROCEDURE — 80048 BASIC METABOLIC PNL TOTAL CA: CPT

## 2021-02-28 PROCEDURE — 99900035 HC TECH TIME PER 15 MIN (STAT)

## 2021-02-28 RX ORDER — PREDNISONE 20 MG/1
20 TABLET ORAL DAILY
Status: DISCONTINUED | OUTPATIENT
Start: 2021-03-01 | End: 2021-03-01 | Stop reason: HOSPADM

## 2021-02-28 RX ORDER — SODIUM CHLORIDE 450 MG/100ML
INJECTION, SOLUTION INTRAVENOUS CONTINUOUS
Status: DISCONTINUED | OUTPATIENT
Start: 2021-02-28 | End: 2021-03-01

## 2021-02-28 RX ORDER — SODIUM CHLORIDE 9 MG/ML
INJECTION, SOLUTION INTRAVENOUS CONTINUOUS
Status: DISCONTINUED | OUTPATIENT
Start: 2021-02-28 | End: 2021-02-28

## 2021-02-28 RX ORDER — IPRATROPIUM BROMIDE AND ALBUTEROL SULFATE 2.5; .5 MG/3ML; MG/3ML
3 SOLUTION RESPIRATORY (INHALATION) EVERY 4 HOURS PRN
Status: DISCONTINUED | OUTPATIENT
Start: 2021-02-28 | End: 2021-03-01 | Stop reason: HOSPADM

## 2021-02-28 RX ORDER — LANOLIN ALCOHOL/MO/W.PET/CERES
200 CREAM (GRAM) TOPICAL DAILY
Status: DISCONTINUED | OUTPATIENT
Start: 2021-03-01 | End: 2021-03-01 | Stop reason: HOSPADM

## 2021-02-28 RX ADMIN — PREDNISONE 40 MG: 20 TABLET ORAL at 08:02

## 2021-02-28 RX ADMIN — SODIUM CHLORIDE: 0.9 INJECTION, SOLUTION INTRAVENOUS at 08:02

## 2021-02-28 RX ADMIN — FLUTICASONE FUROATE AND VILANTEROL TRIFENATATE 1 PUFF: 200; 25 POWDER RESPIRATORY (INHALATION) at 12:02

## 2021-02-28 RX ADMIN — CYANOCOBALAMIN 1000 MCG: 1000 INJECTION, SOLUTION INTRAMUSCULAR; SUBCUTANEOUS at 08:02

## 2021-02-28 RX ADMIN — ESCITALOPRAM OXALATE 10 MG: 10 TABLET ORAL at 08:02

## 2021-02-28 RX ADMIN — Medication 1 TABLET: at 08:02

## 2021-02-28 RX ADMIN — SODIUM CHLORIDE: 0.45 INJECTION, SOLUTION INTRAVENOUS at 12:02

## 2021-02-28 RX ADMIN — MUPIROCIN: 20 OINTMENT TOPICAL at 08:02

## 2021-02-28 RX ADMIN — MIRTAZAPINE 30 MG: 15 TABLET, FILM COATED ORAL at 08:02

## 2021-02-28 RX ADMIN — IPRATROPIUM BROMIDE AND ALBUTEROL SULFATE 3 ML: .5; 3 SOLUTION RESPIRATORY (INHALATION) at 03:02

## 2021-02-28 RX ADMIN — NICOTINE 1 PATCH: 14 PATCH TRANSDERMAL at 08:02

## 2021-02-28 RX ADMIN — DOXYCYCLINE HYCLATE 100 MG: 100 TABLET, COATED ORAL at 08:02

## 2021-02-28 RX ADMIN — ENOXAPARIN SODIUM 40 MG: 40 INJECTION SUBCUTANEOUS at 05:02

## 2021-02-28 RX ADMIN — TIOTROPIUM BROMIDE INHALATION SPRAY 2 PUFF: 3.12 SPRAY, METERED RESPIRATORY (INHALATION) at 12:02

## 2021-02-28 RX ADMIN — MONTELUKAST 10 MG: 10 TABLET, FILM COATED ORAL at 08:02

## 2021-02-28 RX ADMIN — Medication 200 MG: at 08:02

## 2021-02-28 RX ADMIN — IPRATROPIUM BROMIDE AND ALBUTEROL SULFATE 3 ML: .5; 3 SOLUTION RESPIRATORY (INHALATION) at 07:02

## 2021-02-28 RX ADMIN — IPRATROPIUM BROMIDE AND ALBUTEROL SULFATE 3 ML: .5; 3 SOLUTION RESPIRATORY (INHALATION) at 12:02

## 2021-02-28 RX ADMIN — IPRATROPIUM BROMIDE AND ALBUTEROL SULFATE 3 ML: .5; 3 SOLUTION RESPIRATORY (INHALATION) at 08:02

## 2021-03-01 VITALS
HEIGHT: 63 IN | TEMPERATURE: 98 F | OXYGEN SATURATION: 93 % | WEIGHT: 83.31 LBS | RESPIRATION RATE: 17 BRPM | SYSTOLIC BLOOD PRESSURE: 148 MMHG | DIASTOLIC BLOOD PRESSURE: 82 MMHG | BODY MASS INDEX: 14.76 KG/M2 | HEART RATE: 105 BPM

## 2021-03-01 LAB
ALLENS TEST: ABNORMAL
ANION GAP SERPL CALC-SCNC: 7 MMOL/L (ref 8–16)
BASOPHILS # BLD AUTO: 0.02 K/UL (ref 0–0.2)
BASOPHILS NFR BLD: 0.3 % (ref 0–1.9)
BUN SERPL-MCNC: 11 MG/DL (ref 6–20)
CALCIUM SERPL-MCNC: 8.3 MG/DL (ref 8.7–10.5)
CHLORIDE SERPL-SCNC: 101 MMOL/L (ref 95–110)
CO2 SERPL-SCNC: 36 MMOL/L (ref 23–29)
CREAT SERPL-MCNC: 0.5 MG/DL (ref 0.5–1.4)
DELSYS: ABNORMAL
DIFFERENTIAL METHOD: ABNORMAL
EOSINOPHIL # BLD AUTO: 0.1 K/UL (ref 0–0.5)
EOSINOPHIL NFR BLD: 1.7 % (ref 0–8)
EP: 8
ERYTHROCYTE [DISTWIDTH] IN BLOOD BY AUTOMATED COUNT: 13.6 % (ref 11.5–14.5)
ERYTHROCYTE [SEDIMENTATION RATE] IN BLOOD BY WESTERGREN METHOD: 8 MM/H
EST. GFR  (AFRICAN AMERICAN): >60 ML/MIN/1.73 M^2
EST. GFR  (NON AFRICAN AMERICAN): >60 ML/MIN/1.73 M^2
FIO2: 30
GLUCOSE SERPL-MCNC: 82 MG/DL (ref 70–110)
HCO3 UR-SCNC: 51 MMOL/L (ref 24–28)
HCT VFR BLD AUTO: 31.6 % (ref 37–48.5)
HGB BLD-MCNC: 9.8 G/DL (ref 12–16)
IMM GRANULOCYTES # BLD AUTO: 0.03 K/UL (ref 0–0.04)
IMM GRANULOCYTES NFR BLD AUTO: 0.4 % (ref 0–0.5)
IP: 15
LYMPHOCYTES # BLD AUTO: 1.9 K/UL (ref 1–4.8)
LYMPHOCYTES NFR BLD: 25 % (ref 18–48)
MAGNESIUM SERPL-MCNC: 1.7 MG/DL (ref 1.6–2.6)
MCH RBC QN AUTO: 30.1 PG (ref 27–31)
MCHC RBC AUTO-ENTMCNC: 31 G/DL (ref 32–36)
MCV RBC AUTO: 97 FL (ref 82–98)
MIN VOL: 7.2
MODE: ABNORMAL
MONOCYTES # BLD AUTO: 0.6 K/UL (ref 0.3–1)
MONOCYTES NFR BLD: 7.3 % (ref 4–15)
NEUTROPHILS # BLD AUTO: 5 K/UL (ref 1.8–7.7)
NEUTROPHILS NFR BLD: 65.3 % (ref 38–73)
NRBC BLD-RTO: 0 /100 WBC
PCO2 BLDA: 102.9 MMHG (ref 35–45)
PH SMN: 7.3 [PH] (ref 7.35–7.45)
PHOSPHATE SERPL-MCNC: 2.9 MG/DL (ref 2.7–4.5)
PLATELET # BLD AUTO: 269 K/UL (ref 150–350)
PMV BLD AUTO: 11.9 FL (ref 9.2–12.9)
PO2 BLDA: 190 MMHG (ref 80–100)
POC BE: 20 MMOL/L
POC SATURATED O2: 99 % (ref 95–100)
POC TCO2: >50 MMOL/L (ref 23–27)
POTASSIUM SERPL-SCNC: 3.5 MMOL/L (ref 3.5–5.1)
RBC # BLD AUTO: 3.26 M/UL (ref 4–5.4)
SAMPLE: ABNORMAL
SITE: ABNORMAL
SODIUM SERPL-SCNC: 144 MMOL/L (ref 136–145)
SP02: 100
SPONT RATE: 11
WBC # BLD AUTO: 7.64 K/UL (ref 3.9–12.7)

## 2021-03-01 PROCEDURE — 99497 PR ADVNCD CARE PLAN 30 MIN: ICD-10-PCS | Mod: 25,,, | Performed by: NURSE PRACTITIONER

## 2021-03-01 PROCEDURE — 99223 1ST HOSP IP/OBS HIGH 75: CPT | Mod: ,,, | Performed by: NURSE PRACTITIONER

## 2021-03-01 PROCEDURE — 99223 PR INITIAL HOSPITAL CARE,LEVL III: ICD-10-PCS | Mod: ,,, | Performed by: NURSE PRACTITIONER

## 2021-03-01 PROCEDURE — 80048 BASIC METABOLIC PNL TOTAL CA: CPT

## 2021-03-01 PROCEDURE — 27000221 HC OXYGEN, UP TO 24 HOURS

## 2021-03-01 PROCEDURE — 84100 ASSAY OF PHOSPHORUS: CPT

## 2021-03-01 PROCEDURE — 63600175 PHARM REV CODE 636 W HCPCS: Performed by: INTERNAL MEDICINE

## 2021-03-01 PROCEDURE — 25000003 PHARM REV CODE 250: Performed by: HOSPITALIST

## 2021-03-01 PROCEDURE — 63600175 PHARM REV CODE 636 W HCPCS: Performed by: HOSPITALIST

## 2021-03-01 PROCEDURE — 83735 ASSAY OF MAGNESIUM: CPT

## 2021-03-01 PROCEDURE — 94640 AIRWAY INHALATION TREATMENT: CPT

## 2021-03-01 PROCEDURE — 36415 COLL VENOUS BLD VENIPUNCTURE: CPT

## 2021-03-01 PROCEDURE — 25000242 PHARM REV CODE 250 ALT 637 W/ HCPCS: Performed by: HOSPITALIST

## 2021-03-01 PROCEDURE — 85025 COMPLETE CBC W/AUTO DIFF WBC: CPT

## 2021-03-01 PROCEDURE — S4991 NICOTINE PATCH NONLEGEND: HCPCS | Performed by: HOSPITALIST

## 2021-03-01 PROCEDURE — 25000003 PHARM REV CODE 250: Performed by: INTERNAL MEDICINE

## 2021-03-01 PROCEDURE — 99497 ADVNCD CARE PLAN 30 MIN: CPT | Mod: 25,,, | Performed by: NURSE PRACTITIONER

## 2021-03-01 RX ORDER — IBUPROFEN 200 MG
1 TABLET ORAL DAILY
Qty: 30 PATCH | Refills: 0 | Status: SHIPPED | OUTPATIENT
Start: 2021-03-02

## 2021-03-01 RX ORDER — PREDNISONE 20 MG/1
20 TABLET ORAL DAILY
Qty: 3 TABLET | Refills: 0 | Status: SHIPPED | OUTPATIENT
Start: 2021-03-02 | End: 2021-03-05

## 2021-03-01 RX ADMIN — ESCITALOPRAM OXALATE 10 MG: 10 TABLET ORAL at 09:03

## 2021-03-01 RX ADMIN — CYANOCOBALAMIN 1000 MCG: 1000 INJECTION, SOLUTION INTRAMUSCULAR; SUBCUTANEOUS at 09:03

## 2021-03-01 RX ADMIN — PREDNISONE 20 MG: 20 TABLET ORAL at 09:03

## 2021-03-01 RX ADMIN — TIOTROPIUM BROMIDE INHALATION SPRAY 2 PUFF: 3.12 SPRAY, METERED RESPIRATORY (INHALATION) at 07:03

## 2021-03-01 RX ADMIN — IPRATROPIUM BROMIDE AND ALBUTEROL SULFATE 3 ML: .5; 3 SOLUTION RESPIRATORY (INHALATION) at 07:03

## 2021-03-01 RX ADMIN — DOXYCYCLINE HYCLATE 100 MG: 100 TABLET, COATED ORAL at 09:03

## 2021-03-01 RX ADMIN — Medication 1 TABLET: at 09:03

## 2021-03-01 RX ADMIN — MONTELUKAST 10 MG: 10 TABLET, FILM COATED ORAL at 09:03

## 2021-03-01 RX ADMIN — NICOTINE 1 PATCH: 14 PATCH TRANSDERMAL at 09:03

## 2021-03-01 RX ADMIN — SODIUM CHLORIDE: 0.45 INJECTION, SOLUTION INTRAVENOUS at 09:03

## 2021-03-01 RX ADMIN — MUPIROCIN: 20 OINTMENT TOPICAL at 09:03

## 2021-03-01 RX ADMIN — IPRATROPIUM BROMIDE AND ALBUTEROL SULFATE 3 ML: .5; 3 SOLUTION RESPIRATORY (INHALATION) at 11:03

## 2021-03-01 RX ADMIN — Medication 200 MG: at 09:03

## 2021-03-01 RX ADMIN — FLUTICASONE FUROATE AND VILANTEROL TRIFENATATE 1 PUFF: 200; 25 POWDER RESPIRATORY (INHALATION) at 07:03

## 2021-03-03 ENCOUNTER — TELEPHONE (OUTPATIENT)
Dept: FAMILY MEDICINE | Facility: CLINIC | Age: 61
End: 2021-03-03

## 2021-03-09 ENCOUNTER — TELEPHONE (OUTPATIENT)
Dept: FAMILY MEDICINE | Facility: CLINIC | Age: 61
End: 2021-03-09

## 2021-03-09 ENCOUNTER — CARE AT HOME (OUTPATIENT)
Dept: HOME HEALTH SERVICES | Facility: CLINIC | Age: 61
End: 2021-03-09
Payer: MEDICARE

## 2021-03-09 DIAGNOSIS — J96.11 CHRONIC RESPIRATORY FAILURE WITH HYPOXIA: Primary | ICD-10-CM

## 2021-03-09 DIAGNOSIS — J96.21 ACUTE ON CHRONIC RESPIRATORY FAILURE WITH HYPOXIA AND HYPERCAPNIA: ICD-10-CM

## 2021-03-09 DIAGNOSIS — J43.1 PANLOBULAR EMPHYSEMA: ICD-10-CM

## 2021-03-09 DIAGNOSIS — J96.22 ACUTE ON CHRONIC RESPIRATORY FAILURE WITH HYPOXIA AND HYPERCAPNIA: ICD-10-CM

## 2021-03-09 DIAGNOSIS — Z51.5 PALLIATIVE CARE ENCOUNTER: Primary | ICD-10-CM

## 2021-03-09 PROCEDURE — 99497 PR ADVNCD CARE PLAN 30 MIN: ICD-10-PCS | Mod: S$GLB,,, | Performed by: NURSE PRACTITIONER

## 2021-03-09 PROCEDURE — 99350 HOME/RES VST EST HIGH MDM 60: CPT | Mod: S$GLB,,, | Performed by: NURSE PRACTITIONER

## 2021-03-09 PROCEDURE — 99350 PR HOME VISIT,ESTAB PATIENT,LEVEL IV: ICD-10-PCS | Mod: S$GLB,,, | Performed by: NURSE PRACTITIONER

## 2021-03-09 PROCEDURE — 99497 ADVNCD CARE PLAN 30 MIN: CPT | Mod: S$GLB,,, | Performed by: NURSE PRACTITIONER

## 2021-03-10 VITALS
DIASTOLIC BLOOD PRESSURE: 62 MMHG | RESPIRATION RATE: 20 BRPM | OXYGEN SATURATION: 98 % | SYSTOLIC BLOOD PRESSURE: 120 MMHG | HEART RATE: 100 BPM | TEMPERATURE: 99 F

## 2021-03-11 ENCOUNTER — TELEPHONE (OUTPATIENT)
Dept: FAMILY MEDICINE | Facility: CLINIC | Age: 61
End: 2021-03-11

## 2021-03-11 ENCOUNTER — DOCUMENT SCAN (OUTPATIENT)
Dept: HOME HEALTH SERVICES | Facility: HOSPITAL | Age: 61
End: 2021-03-11
Payer: MEDICARE

## 2021-03-11 DIAGNOSIS — J43.1 PANLOBULAR EMPHYSEMA: ICD-10-CM

## 2021-03-11 DIAGNOSIS — J96.11 CHRONIC RESPIRATORY FAILURE WITH HYPOXIA: Primary | ICD-10-CM

## 2021-03-17 ENCOUNTER — DOCUMENT SCAN (OUTPATIENT)
Dept: HOME HEALTH SERVICES | Facility: HOSPITAL | Age: 61
End: 2021-03-17
Payer: MEDICARE

## 2021-03-17 ENCOUNTER — EXTERNAL HOME HEALTH (OUTPATIENT)
Dept: HOME HEALTH SERVICES | Facility: HOSPITAL | Age: 61
End: 2021-03-17
Payer: MEDICARE

## 2021-03-25 ENCOUNTER — TELEPHONE (OUTPATIENT)
Dept: FAMILY MEDICINE | Facility: CLINIC | Age: 61
End: 2021-03-25

## 2021-03-25 DIAGNOSIS — J43.1 PANLOBULAR EMPHYSEMA: ICD-10-CM

## 2021-03-25 DIAGNOSIS — J96.11 CHRONIC RESPIRATORY FAILURE WITH HYPOXIA: Primary | ICD-10-CM

## 2021-03-26 ENCOUNTER — TELEPHONE (OUTPATIENT)
Dept: HOME HEALTH SERVICES | Facility: CLINIC | Age: 61
End: 2021-03-26

## 2021-03-31 ENCOUNTER — DOCUMENT SCAN (OUTPATIENT)
Dept: HOME HEALTH SERVICES | Facility: HOSPITAL | Age: 61
End: 2021-03-31
Payer: MEDICARE

## 2021-04-08 ENCOUNTER — DOCUMENT SCAN (OUTPATIENT)
Dept: HOME HEALTH SERVICES | Facility: HOSPITAL | Age: 61
End: 2021-04-08
Payer: MEDICARE

## 2021-05-19 ENCOUNTER — OFFICE VISIT (OUTPATIENT)
Dept: OPTOMETRY | Facility: CLINIC | Age: 61
End: 2021-05-19
Payer: MEDICARE

## 2021-05-19 DIAGNOSIS — H25.13 NUCLEAR SCLEROSIS OF BOTH EYES: Primary | ICD-10-CM

## 2021-05-19 DIAGNOSIS — H52.7 REFRACTIVE ERROR: ICD-10-CM

## 2021-05-19 PROCEDURE — 99213 OFFICE O/P EST LOW 20 MIN: CPT | Mod: PBBFAC,PO | Performed by: OPTOMETRIST

## 2021-05-19 PROCEDURE — 99999 PR PBB SHADOW E&M-EST. PATIENT-LVL III: ICD-10-PCS | Mod: PBBFAC,,, | Performed by: OPTOMETRIST

## 2021-05-19 PROCEDURE — 92004 PR EYE EXAM, NEW PATIENT,COMPREHESV: ICD-10-PCS | Mod: GW,S$PBB,ICN, | Performed by: OPTOMETRIST

## 2021-05-19 PROCEDURE — 99999 PR PBB SHADOW E&M-EST. PATIENT-LVL III: CPT | Mod: PBBFAC,,, | Performed by: OPTOMETRIST

## 2021-05-19 PROCEDURE — 92004 COMPRE OPH EXAM NEW PT 1/>: CPT | Mod: GW,S$PBB,ICN, | Performed by: OPTOMETRIST

## 2021-05-19 PROCEDURE — 92015 PR REFRACTION: ICD-10-PCS | Mod: GW,ICN,, | Performed by: OPTOMETRIST

## 2021-05-19 PROCEDURE — 92015 DETERMINE REFRACTIVE STATE: CPT | Mod: GW,ICN,, | Performed by: OPTOMETRIST
